# Patient Record
Sex: MALE | Race: WHITE | NOT HISPANIC OR LATINO | Employment: OTHER | URBAN - METROPOLITAN AREA
[De-identification: names, ages, dates, MRNs, and addresses within clinical notes are randomized per-mention and may not be internally consistent; named-entity substitution may affect disease eponyms.]

---

## 2017-02-01 ENCOUNTER — HOSPITAL ENCOUNTER (OUTPATIENT)
Dept: GENERAL RADIOLOGY | Facility: HOSPITAL | Age: 62
Discharge: HOME | End: 2017-02-01
Admitting: INTERNAL MEDICINE

## 2017-02-01 ENCOUNTER — OFFICE VISIT (OUTPATIENT)
Dept: FAMILY MEDICINE CLINIC | Facility: CLINIC | Age: 62
End: 2017-02-01

## 2017-02-01 VITALS
SYSTOLIC BLOOD PRESSURE: 144 MMHG | RESPIRATION RATE: 18 BRPM | BODY MASS INDEX: 40.56 KG/M2 | HEART RATE: 89 BPM | DIASTOLIC BLOOD PRESSURE: 84 MMHG | WEIGHT: 289.7 LBS | HEIGHT: 71 IN | TEMPERATURE: 98.1 F

## 2017-02-01 DIAGNOSIS — G89.29 CHRONIC PAIN OF RIGHT KNEE: Primary | ICD-10-CM

## 2017-02-01 DIAGNOSIS — M25.561 CHRONIC PAIN OF RIGHT KNEE: Primary | ICD-10-CM

## 2017-02-01 PROCEDURE — 73560 X-RAY EXAM OF KNEE 1 OR 2: CPT

## 2017-02-01 PROCEDURE — 99213 OFFICE O/P EST LOW 20 MIN: CPT | Performed by: INTERNAL MEDICINE

## 2017-02-01 RX ORDER — AZELASTINE 1 MG/ML
1 SPRAY, METERED NASAL NIGHTLY
COMMUNITY
End: 2019-09-24 | Stop reason: SDUPTHER

## 2017-02-01 RX ORDER — DEXTROAMPHETAMINE SACCHARATE, AMPHETAMINE ASPARTATE, DEXTROAMPHETAMINE SULFATE AND AMPHETAMINE SULFATE 5; 5; 5; 5 MG/1; MG/1; MG/1; MG/1
20 TABLET ORAL DAILY
COMMUNITY

## 2017-02-01 NOTE — PROGRESS NOTES
"Subjective   Patient ID: Jose Ramon Murcia is a 61 y.o. male presents with   Chief Complaint   Patient presents with   • Knee Pain     Right knee, pain that is over the knee cap and on to the right side of knee cap, it is a very tight feeling. Cant walk down steps with bending knee at all   • Immunizations     wants to get shingles shot       HPI - this patient presents today with complaints of chronic right knee pain recently has gotten worse going up and down the stairs.  Primarily it's on the medial aspect of the right knee area he's tried no nonsteroidal anti-inflammatories he has no problem with them however he's had him in the past without difficulty.    Assessment plan    Chronic right knee pain suspect meniscal injury start Advil 400 mg twice daily with food warned him of potential GI side effects hypertension and swelling.  We'll get a knee x-ray.    Allergies   Allergen Reactions   • Clarithromycin    • Sulfa Antibiotics        The following portions of the patient's history were reviewed and updated as appropriate: allergies, current medications, past family history, past medical history, past social history, past surgical history and problem list.      Review of Systems   Constitutional: Negative.    Musculoskeletal: Positive for arthralgias and gait problem.   Neurological: Negative for weakness and numbness.   Psychiatric/Behavioral: Negative.        Objective     Vitals:    02/01/17 0932   BP: 144/84   Pulse: 89   Resp: 18   Temp: 98.1 °F (36.7 °C)   TempSrc: Oral   Weight: 289 lb 11.2 oz (131 kg)   Height: 71\" (180.3 cm)         Physical Exam   Constitutional: He is oriented to person, place, and time. He appears well-developed and well-nourished.   Musculoskeletal: He exhibits no edema, tenderness or deformity.   Neurological: He is alert and oriented to person, place, and time.   Psychiatric: He has a normal mood and affect. His behavior is normal.   Nursing note and vitals " reviewed.        Jsoe Ramon was seen today for knee pain and immunizations.    Diagnoses and all orders for this visit:    Chronic pain of right knee  -     XR Knee 1 or 2 View Right        Call or return to clinic prn if these symptoms worsen or fail to improve as anticipated.

## 2017-03-08 RX ORDER — TAMSULOSIN HYDROCHLORIDE 0.4 MG/1
CAPSULE ORAL
Qty: 90 CAPSULE | Refills: 0 | Status: SHIPPED | OUTPATIENT
Start: 2017-03-08 | End: 2017-06-06 | Stop reason: SDUPTHER

## 2017-03-24 ENCOUNTER — OFFICE VISIT (OUTPATIENT)
Dept: FAMILY MEDICINE CLINIC | Facility: CLINIC | Age: 62
End: 2017-03-24

## 2017-03-24 VITALS
HEIGHT: 71 IN | TEMPERATURE: 98.2 F | HEART RATE: 86 BPM | WEIGHT: 287 LBS | RESPIRATION RATE: 18 BRPM | SYSTOLIC BLOOD PRESSURE: 172 MMHG | OXYGEN SATURATION: 93 % | DIASTOLIC BLOOD PRESSURE: 88 MMHG | BODY MASS INDEX: 40.18 KG/M2

## 2017-03-24 DIAGNOSIS — M75.82 ROTATOR CUFF TENDONITIS, LEFT: ICD-10-CM

## 2017-03-24 DIAGNOSIS — M25.561 CHRONIC PAIN OF RIGHT KNEE: Primary | ICD-10-CM

## 2017-03-24 DIAGNOSIS — G89.29 CHRONIC PAIN OF RIGHT KNEE: Primary | ICD-10-CM

## 2017-03-24 PROBLEM — M75.80 ROTATOR CUFF TENDONITIS: Status: ACTIVE | Noted: 2017-03-24

## 2017-03-24 PROCEDURE — 96372 THER/PROPH/DIAG INJ SC/IM: CPT | Performed by: INTERNAL MEDICINE

## 2017-03-24 PROCEDURE — 99213 OFFICE O/P EST LOW 20 MIN: CPT | Performed by: INTERNAL MEDICINE

## 2017-03-24 RX ORDER — METHYLPREDNISOLONE ACETATE 80 MG/ML
80 INJECTION, SUSPENSION INTRA-ARTICULAR; INTRALESIONAL; INTRAMUSCULAR; SOFT TISSUE ONCE
Status: COMPLETED | OUTPATIENT
Start: 2017-03-24 | End: 2017-03-24

## 2017-03-24 RX ADMIN — METHYLPREDNISOLONE ACETATE 80 MG: 80 INJECTION, SUSPENSION INTRA-ARTICULAR; INTRALESIONAL; INTRAMUSCULAR; SOFT TISSUE at 14:56

## 2017-03-24 NOTE — PROGRESS NOTES
"Subjective   Patient ID: Jose Ramon Murcia is a 62 y.o. male presents with   Chief Complaint   Patient presents with   • Knee Pain     both knees   • Shoulder Pain     left   • Middle Finger Pain     on right hand       HPI - this patient has osteoarthritis of the right knee greater than left.  We did an x-ray that demonstrated than his symptoms are consistent with it.  Pain in the medial aspect of the knees worse with ambulation described as a soreness.  He's not tried any nonsteroidal anti-inflammatories he wants to try some physical therapy.  Also is having left shoulder pain with abduction.  This been going on for months but gotten worse.    Assessment plan    Osteoarthritis the right knee for and rotator cuff left shoulder send to physical therapy we'll give him Depo-Medrol 80 in the left shoulder.  In about 3 days on him to start taking ibuprofen 400 mg twice daily with food warned him of potential side effects.  If patient's not getting better he's to let me know.  He agreed.    Allergies   Allergen Reactions   • Clarithromycin    • Sulfa Antibiotics        The following portions of the patient's history were reviewed and updated as appropriate: allergies, current medications, past family history, past medical history, past social history, past surgical history and problem list.      Review of Systems   Constitutional: Negative.    Respiratory: Negative.    Cardiovascular: Negative.    Musculoskeletal: Positive for arthralgias, gait problem and joint swelling.       Objective     Vitals:    03/24/17 1430   BP: 172/88   Pulse: 86   Resp: 18   Temp: 98.2 °F (36.8 °C)   TempSrc: Oral   SpO2: 93%   Weight: 287 lb (130 kg)   Height: 71\" (180.3 cm)         Physical Exam   Constitutional: He is oriented to person, place, and time. He appears well-developed and well-nourished.   Musculoskeletal: He exhibits tenderness.   Pain with active and passive range of motion left shoulder.  Mild tenderness medial aspect of " the left knee.   Neurological: He is alert and oriented to person, place, and time.   Psychiatric: He has a normal mood and affect. His behavior is normal.   Nursing note and vitals reviewed.        Jose Ramon was seen today for knee pain, shoulder pain and middle finger pain.    Diagnoses and all orders for this visit:    Chronic pain of right knee  -     Ambulatory Referral to Physical Therapy  -     Ambulatory Referral to Physical Therapy Evaluate and treat    Rotator cuff tendonitis, left  -     Ambulatory Referral to Physical Therapy Evaluate and treat  -     methylPREDNISolone acetate (DEPO-medrol) injection 80 mg; Inject 1 mL into the shoulder, thigh, or buttocks 1 (One) Time.        Call or return to clinic prn if these symptoms worsen or fail to improve as anticipated.

## 2017-04-07 ENCOUNTER — HOSPITAL ENCOUNTER (OUTPATIENT)
Dept: PHYSICAL THERAPY | Facility: HOSPITAL | Age: 62
Setting detail: THERAPIES SERIES
Discharge: HOME | End: 2017-04-07

## 2017-04-07 DIAGNOSIS — G89.29 CHRONIC PAIN OF BOTH KNEES: Primary | ICD-10-CM

## 2017-04-07 DIAGNOSIS — G89.29 CHRONIC LEFT SHOULDER PAIN: ICD-10-CM

## 2017-04-07 DIAGNOSIS — M25.562 CHRONIC PAIN OF BOTH KNEES: Primary | ICD-10-CM

## 2017-04-07 DIAGNOSIS — M17.11 PATELLOFEMORAL ARTHRITIS OF RIGHT KNEE: ICD-10-CM

## 2017-04-07 DIAGNOSIS — M75.52 SHOULDER BURSITIS, LEFT: ICD-10-CM

## 2017-04-07 DIAGNOSIS — M25.561 CHRONIC PAIN OF BOTH KNEES: Primary | ICD-10-CM

## 2017-04-07 DIAGNOSIS — M25.512 CHRONIC LEFT SHOULDER PAIN: ICD-10-CM

## 2017-04-07 DIAGNOSIS — S46.012D STRAIN OF ROTATOR CUFF CAPSULE, LEFT, SUBSEQUENT ENCOUNTER: ICD-10-CM

## 2017-04-07 PROCEDURE — 97110 THERAPEUTIC EXERCISES: CPT | Performed by: PHYSICAL THERAPIST

## 2017-04-07 PROCEDURE — 97161 PT EVAL LOW COMPLEX 20 MIN: CPT | Performed by: PHYSICAL THERAPIST

## 2017-04-07 NOTE — PROGRESS NOTES
"    Outpatient Physical Therapy Ortho Initial Evaluation  [facility]     Patient Name: Jose Ramon Murcia  : 1955  MRN: [id number]  Today's Date: 2017      Visit Date: 2017    Patient Active Problem List   Diagnosis   • Allergic rhinitis   • Cervical spinal stenosis   • ED (erectile dysfunction) of non-organic origin   • BP (high blood pressure)   • Lumbar radiculopathy   • Neoplasm of skin   • Obstructive apnea   • Burning or prickling sensation   • Neck pain   • Benign non-nodular prostatic hyperplasia with lower urinary tract symptoms   • Shoulder pain, acute   • Urinary frequency   • Hyperlipidemia   • Chronic pain of right knee   • Rotator cuff tendonitis        Past Medical History:   Diagnosis Date   • Allergic    • Arthritis    • ED (erectile dysfunction)    • Hypertension         Past Surgical History:   Procedure Laterality Date   • ANAL FISTULOTOMY     • APPENDECTOMY     • COLONOSCOPY     • NECK SURGERY     • WISDOM TOOTH EXTRACTION         Visit Dx:     ICD-10-CM ICD-9-CM   1. Chronic pain of both knees M25.561 719.46    M25.562 338.29    G89.29    2. Chronic left shoulder pain M25.512 719.41    G89.29 338.29   3. Patellofemoral arthritis of right knee M19.90 716.96   4. Shoulder bursitis, left M75.52 726.10   5. 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 I started noticing my knee pain a few years ago. At times my R knee acts like it wants to \"give " "out,\" especially on stairs. My L side does it too but not as often. I also have a lot of trouble sitting with my knee bent for any length of time. I am always adjusting/moving about to keep the knee from aching. I injured my R ankle years ago so that doesnt help. With respect to my L shoulder, it started 9-10 months ago. No trauma. I have a lot of pain raising overhead, reaching behind my back, or putting on a shirt. I got a cortisone injection and it feels a little better but I cannot sleep on that side without pain.   -CK      Previous treatment for THIS PROBLEM Injections  -CK      Patient/Caregiver Goals Relieve pain;Return to prior level of function;Know what to do to help the symptoms  -CK      Current Tobacco Use none  -CK      Patient's Rating of General Health Good  -CK      Hand Dominance right-handed  -CK      What clinical tests have you had for this problem? X-ray  -CK      Results of Clinical Tests patellofemoral arthritis and medial compartment arthritis  -CK      Pain     Pain Location Knee;Leg;Shoulder  -CK      Pain at Present 1  -CK      Pain at Best 4  -CK      Pain at Worst 0  -CK      Pain Frequency Constant/continuous  -CK      Pain Description Sharp;Shooting  -CK      Is your sleep disturbed? Yes  -CK      Fall Risk Assessment    Any falls in the past year: No  -CK      Services    Prior Rehab/Home Health Experiences No  -CK      Daily Activities    Primary Language English  -CK      How does patient learn best? Listening;Reading;Demonstration;Pictures/Video  -CK      Teaching needs identified Home Exercise Program;Management of Condition  -CK      Patient is concerned about/has problems with Climbing Stairs;Difficulty with self care (i.e. bathing, dressing, toileting:;Flexibility;Performing home management (household chores, shopping, care of dependents);Performing job responsibilities/community activities (work, school,;Performing sports, recreation, and play activities;Reaching over " head;Repetitive movements of the hand, arm, shoulder;Sitting;Walking  -CK      Does patient have problems with the following? None  -CK      Barriers to learning None  -CK      Functional Status --   Independent  -CK      Pt Participated in POC and Goals Yes  -CK      Safety    Are you being hurt, hit, or frightened by anyone at home or in your life? No  -CK      Are you being neglected by a caregiver No  -CK        User Key  (r) = Recorded By, (t) = Taken By, (c) = Cosigned By    Initials Name Provider Type    CK Mohan Valdivia, PT Physical Therapist                PT Ortho       04/07/17 1000    Posture/Observations    Rounded Shoulders Moderate  -CK    Genu varus Mild  -CK    Foot pronation Mild  -CK    Sensation    Sensation WNL? WNL  -CK    Light Touch No apparent deficits  -CK    Left Shoulder    Flexion AROM Deficit 150, painful arc   -CK    ABduction AROM Deficit 150, pain end range  -CK    External Rotation AROM Deficit 55, pain end range  -CK    Internal Rotation AROM Deficit L hip, just posterior to side of body  -CK    Left Knee    Extension/Flexion AROM Deficit 0-118  -CK    Right Knee    Extension/Flexion AROM Deficit 0-115  -CK    Left Shoulder    Flexion Gross Movement (4+/5) good plus   pain  -CK    ABduction Gross Movement (4+/5) good plus   pain  -CK    Int Rotation Gross Movement (4/5) good   pain  -CK    Ext Rotation Gross Movement (4/5) good   pain  -CK    Left Hip    Hip Flexion Gross Movement (4+/5) good plus  -CK    Right Hip    Hip Flexion Gross Movement (4+/5) good plus  -CK    Left Knee    Knee Extension Gross Movement (4+/5) good plus  -CK    Knee Flexion Gross Movement (4+/5) good plus  -CK    Right Knee    Knee Extension Gross Movement (4+/5) good plus  -CK    Knee Flexion Gross Movement (4/5) good  -CK    Upper Extremity Flexibility    Pect Minor Bilateral:;Moderately limited  -CK    Lower Extremity Flexibility    Hamstrings Bilateral:;Moderately limited  -CK    Hip Flexors  Bilateral:;Moderately limited  -CK    Quadriceps Bilateral:;Moderately limited  -CK    Gastrocnemius Bilateral:;Moderately limited  -CK    Soleus Bilateral:;Moderately limited  -CK    Transfers    Transfers, Sit-Stand Clayton independent  -CK    Gait Assessment/Treatment    Gait, Clayton Level independent  -CK    Stairs Assessment/Treatment    Number of Stairs 4  -CK    Stairs, Handrail Location both sides  -CK    Stairs, [place] Level independent  -CK    Stairs, Technique Used step to step (ascending);step to step (descending)  -CK      User Key  (r) = Recorded By, (t) = Taken By, (c) = Cosigned By    Initials Name Provider Type    KEAGAN Valdivia PT Physical Therapist                            Therapy Education       04/07/17 1050          Therapy Education    Given HEP;Symptoms/condition management;Pain management   HEP handouts given. Instructed to use Ice after  -CK      Program New  -CK      How Provided Demonstration;Written;Verbal  -CK      Provided to Patient  -CK      Level of Understanding Teach back education performed;Verbalized  -CK        User Key  (r) = Recorded By, (t) = Taken By, (c) = Cosigned By    Initials Name Provider Type    KEAGAN Valdivia PT Physical Therapist                PT OP Goals       04/07/17 1000       PT Short Term Goals    STG 1 Patient will be independent and compliant with initial home exercise program  -CK     STG 2 Patient will be independent with education for symptom management, joint protection and strategies to minimize stress on affected tissues  -CK     Long Term Goals    LTG 1 Patient will be independent and compliant with advanced home exercise program to facilitate self-management of symptoms  -CK     LTG 2 Patient will increase shoulder flexion L AROM flexion to 0-160 deg, without painful arc, to increase ease of reaching overhead into cabinets, putting on shirt, etc.  -CK     LTG 3 Patient will be able to reach with L shoulder to L2 for  "improved IR with donning/doffing belt.  -CK     LTG 4 Pt. will report L shoulder pain </= 1-2/10 with all daily activities and sleeping.  -CK     LTG 5 Patient will report 50% improvement with stair negotiation and report no episodes of \"giving way.\"  -CK     LTG 6 Patient will score >/= 60/80 on Knee Outcome Survery, indicating improved perceived functional ability with daily activities  -CK     LTG 7 Patient will report ability to sit >/= 30 min without shifting in chair/changing knee positions in order for him to better perform his job as a [employer] .   -CK       User Key  (r) = Recorded By, (t) = Taken By, (c) = Cosigned By    Initials Name Provider Type    CK Mohan Valdivia, PT Physical Therapist                PT Assessment/Plan       04/07/17 1106       PT Assessment    Functional Limitations Limitation in home management;Limitations in community activities;Performance in work activities;Performance in sport activities;Performance in self-care ADL;Performance in leisure activities;Limitations in functional capacity and performance  -CK     Impairments Range of motion;Impaired muscle endurance;Pain;Muscle strength;Posture;Joint mobility;Impaired flexibility  -CK     Assessment Comments Mr. Doll is a 62 year old male who presents to clinic with c/o B knee pain and L shoulder pain. Imaging performed to R knee confirmed medial compartment arthritis and patellofemoral arthritis with spurs present. He reports difficulty with extended sitting (knee flexed) and difficulty with stairs. An \"old\" R ankle injury complicates his R knee pain. AROM R 0-115 and L 0-118. Mr Doll also reports c/o L shoulder pain of 9-10 months nature. No trauma. He reports difficulty reaching overhead, dressing, reaching behind his back, or attempting to sleep on the L side. No imaging of shoulder performed to date. Special testing + for impingement and subacromial bursa irritation. Special tests negative for RTC tear. " Self Scored outcome measures: Knee Outcome Survery 49/80 = 61% (where 80/80= no deficits) and DASH (UE) = 34 (where 0 = no deficits). He would benefit from skilled therapy to address his many deficits in order to improve his current quality of life and educate him on self management once discharged from formal therapy.   -CK     Please refer to paper survey for additional self-reported information Yes  -CK     Rehab Potential Good  -CK     Patient/caregiver participated in establishment of treatment plan and goals Yes  -CK     Patient would benefit from skilled therapy intervention Yes  -CK     PT Plan    PT Frequency 2x/week  -CK     Predicted Duration of Therapy Intervention (days/wks) 1 month  -CK     Planned CPT's? PT EVAL LOW COMPLEXITY: 20450;PT THER PROC EA 15 MIN: 70439;PT NEUROMUSC RE-EDUCATION EA 15 MIN: 97502;PT MANUAL THERAPY EA 15 MIN: 95052;PT AQUATIC THERAPY EA 15 MIN: 85322;PT ULTRASOUND EA 15 MIN: 41536;PT HOT OR COLD PACK TREAT MCARE;PT IONTOPHORESIS EA 15 MIN: 03758;PT ELECTRICAL STIM UNATTEND:   -CK     PT Plan Comments Two body parts: B knee and L shoulder. Review HEP, answer questions as needed. Knee strengthening/stretching program. Add in R way resisted ankle. Scapular strengthening program. Modalities PRN for L shoulder (ie. ultrasound). Ice at end.   -CK       User Key  (r) = Recorded By, (t) = Taken By, (c) = Cosigned By    Initials Name Provider Type    CK Mohan DESIREE Valdivia, PT Physical Therapist                  Exercises       04/07/17 1000          Exercise 1    Exercise Name 1 reverse shoulder rolls  -GJ      Cueing 1 Verbal;Demo  -GJ      Reps 1 15  -GJ      Exercise 2    Exercise Name 2 scap retraction  -GJ      Cueing 2 Verbal;Demo  -GJ      Reps 2 15  -GJ      Time (Seconds) 2 3  -GJ      Exercise 3    Exercise Name 3 shoulder ext  -GJ      Cueing 3 Verbal;Demo  -GJ      Equipment 3 Theraband  -GJ      Resistance 3 Red  -GJ      Reps 3 15  -GJ      Exercise 4    Exercise Name 4  Rows  -GJ      Cueing 4 Verbal;Demo  -GJ      Equipment 4 Theraband  -GJ      Resistance 4 Red  -GJ      Reps 4 15  -GJ      Exercise 5    Exercise Name 5 wash the wall  -GJ      Cueing 5 Demo  -GJ      Reps 5 10  -GJ      Exercise 6    Exercise Name 6 1 arm neural stretch at door frame,   -GJ      Cueing 6 Demo  -GJ      Reps 6 3  -GJ      Time (Seconds) 6 20  -GJ      Exercise 7    Exercise Name 7 quad set  -GJ      Cueing 7 Verbal  -GJ      Reps 7 5  -GJ      Time (Seconds) 7 15  -GJ      Exercise 8    Exercise Name 8 LAQ  -GJ      Cueing 8 Demo  -GJ      Reps 8 10  -GJ      Exercise 9    Exercise Name 9 gastroc/soleus stretch on step  -GJ      Cueing 9 Demo  -GJ      Reps 9 3  -GJ      Time (Seconds) 9 30  -GJ      Exercise 10    Exercise Name 10 standing quad/hip flexor stretch (can substitue prone hip flexor/quad stretch)  -GJ      Cueing 10 Demo  -GJ      Reps 10 3  -GJ      Time (Seconds) 10 20  -GJ        User Key  (r) = Recorded By, (t) = Taken By, (c) = Cosigned By    Initials Name Provider Type    ALAINA Morataya PT Physical Therapist                              Outcome Measures       04/07/17 1000          DASH    DASH COMMENTS 34%  -GJ      Knee Outcome Score    Knee Outcome Score Comments 61%  -GJ      Functional Assessment    Outcome Measure Options Knee Outcome Score- ADL;Disabilities of the Arm, Shoulder, and Hand (DASH)  -GJ        User Key  (r) = Recorded By, (t) = Taken By, (c) = Cosigned By    Initials Name Provider Type    ALAINA Morataya PT Physical Therapist            Time Calculation:   Start Time: 0930  Stop Time: 1030  Time Calculation (min): 60 min     Therapy Charges for Today     Code Description Service Date Service Provider Modifiers Qty    57869663115  PT EVAL LOW COMPLEXITY 3 4/7/2017 Moahn Valdivia, PT GP 1    [id number] HC PT THER PROC EA 15 MIN 4/7/2017 Mohan Valdivia, PT GP 1          PT G-Codes  Outcome Measure Options: Knee Outcome Score- ADL, Disabilities of the  Arm, Shoulder, and Hand (DASH)         Mohan Valdivia, PT  4/7/2017

## 2017-04-19 ENCOUNTER — HOSPITAL ENCOUNTER (OUTPATIENT)
Dept: PHYSICAL THERAPY | Facility: HOSPITAL | Age: 62
Setting detail: THERAPIES SERIES
Discharge: HOME | End: 2017-04-19

## 2017-04-19 DIAGNOSIS — M17.11 PATELLOFEMORAL ARTHRITIS OF RIGHT KNEE: ICD-10-CM

## 2017-04-19 DIAGNOSIS — G89.29 CHRONIC LEFT SHOULDER PAIN: ICD-10-CM

## 2017-04-19 DIAGNOSIS — M75.52 SHOULDER BURSITIS, LEFT: ICD-10-CM

## 2017-04-19 DIAGNOSIS — M25.512 CHRONIC LEFT SHOULDER PAIN: ICD-10-CM

## 2017-04-19 DIAGNOSIS — M25.561 CHRONIC PAIN OF BOTH KNEES: Primary | ICD-10-CM

## 2017-04-19 DIAGNOSIS — M25.562 CHRONIC PAIN OF BOTH KNEES: Primary | ICD-10-CM

## 2017-04-19 DIAGNOSIS — G89.29 CHRONIC PAIN OF BOTH KNEES: Primary | ICD-10-CM

## 2017-04-19 DIAGNOSIS — S46.012D STRAIN OF ROTATOR CUFF CAPSULE, LEFT, SUBSEQUENT ENCOUNTER: ICD-10-CM

## 2017-04-19 PROCEDURE — 97110 THERAPEUTIC EXERCISES: CPT

## 2017-04-19 NOTE — PROGRESS NOTES
Outpatient Physical Therapy Ortho Treatment Note  [facility]     Patient Name: Jose Ramon Murcia  : 1955  MRN: [id number]  Today's Date: 2017      Visit Date: 2017    Visit Dx:    ICD-10-CM ICD-9-CM   1. Chronic pain of both knees M25.561 719.46    M25.562 338.29    G89.29    2. Chronic left shoulder pain M25.512 719.41    G89.29 338.29   3. Patellofemoral arthritis of right knee M19.90 716.96   4. Shoulder bursitis, left M75.52 726.10   5. Strain of rotator cuff capsule, left, subsequent encounter S46.012D V58.89     840.4       Patient Active Problem List   Diagnosis   • Allergic rhinitis   • Cervical spinal stenosis   • ED (erectile dysfunction) of non-organic origin   • BP (high blood pressure)   • Lumbar radiculopathy   • Neoplasm of skin   • Obstructive apnea   • Burning or prickling sensation   • Neck pain   • Benign non-nodular prostatic hyperplasia with lower urinary tract symptoms   • Shoulder pain, acute   • Urinary frequency   • Hyperlipidemia   • Chronic pain of right knee   • Rotator cuff tendonitis        Past Medical History:   Diagnosis Date   • Allergic    • Arthritis    • ED (erectile dysfunction)    • Hypertension         Past Surgical History:   Procedure Laterality Date   • ANAL FISTULOTOMY     • APPENDECTOMY     • COLONOSCOPY     • NECK SURGERY     • WISDOM TOOTH EXTRACTION                               PT Assessment/Plan       17 1309       PT Assessment    Assessment Comments Patient continues to have increased pain margarette with stairs. Patient currently woking on postural position with sleeping on left shoulder. Added right ankle strengthening, RTC strengthening and added weight margarette LE to increase strength. Patient will look at home for ankle weights. Continue work on strengthening and education.   -BELA       User Key  (r) = Recorded By, (t) = Taken By, (c) = Cosigned By    Initials Name Provider Type    BELA Ko PTA Physical Therapy Assistant                     Exercises       04/19/17 1225          Subjective Comments    Subjective Comments No problems with HEP. Knee pain walking up/down stairs  -WS      Exercise 1    Exercise Name 1 reverse shoulder rolls  -WS      Cueing 1 Verbal;Demo  -WS      Reps 1 15  -WS      Exercise 2    Exercise Name 2 scap retraction  -WS      Cueing 2 Verbal;Demo  -WS      Reps 2 15  -WS      Time (Seconds) 2 3  -WS      Exercise 3    Exercise Name 3 shoulder ext  -WS      Cueing 3 Verbal;Demo  -WS      Equipment 3 Theraband  -WS      Resistance 3 Red  -WS      Reps 3 15  -WS      Exercise 4    Exercise Name 4 Rows  -WS      Cueing 4 Verbal;Demo  -WS      Equipment 4 Theraband  -WS      Resistance 4 Red  -WS      Reps 4 15  -WS      Exercise 5    Exercise Name 5 wash the wall   circles  -WS      Cueing 5 Demo  -WS      Reps 5 10  -WS      Exercise 6    Exercise Name 6 1 arm neural stretch at door frame,   -WS      Cueing 6 Demo  -WS      Reps 6 3  -WS      Time (Seconds) 6 20  -WS      Exercise 7    Exercise Name 7 quad set  -WS      Cueing 7 Verbal  -WS      Reps 7 5  -WS      Time (Seconds) 7 15  -WS      Exercise 8    Exercise Name 8 LAQ  -WS      Cueing 8 Demo  -WS      Weights/Plates 8 3  -WS      Reps 8 15  -WS      Exercise 9    Exercise Name 9 gastroc/soleus stretch on step  -WS      Cueing 9 Demo  -WS      Reps 9 3  -WS      Time (Seconds) 9 30  -WS      Exercise 10    Exercise Name 10 standing quad/hip flexor stretch (can substitue prone hip flexor/quad stretch)  -WS      Cueing 10 Demo  -WS      Reps 10 3  -WS      Time (Seconds) 10 20  -WS      Exercise 11    Exercise Name 11 Nu step L5  -WS      Time (Minutes) 11 5  -WS      Exercise 12    Exercise Name 12 calf raise  -WS      Reps 12 15  -WS      Exercise 13    Exercise Name 13 Hamstring curl margarette  -WS      Weights/Plates 13 2  -WS      Reps 13 15  -WS      Exercise 14    Exercise Name 14 margarette ER  -WS      Resistance 14 Red  -WS      Reps 14 10  -WS      Exercise 15  "   Exercise Name 15 ankle 3 way  -      Resistance 15 Red  -      Reps 15 15  -        User Key  (r) = Recorded By, (t) = Taken By, (c) = Cosigned By    Initials Name Provider Type    BELA Ko PTA Physical Therapy Assistant                               PT OP Goals       04/19/17 1300       PT Short Term Goals    STG 1 Patient will be independent and compliant with initial home exercise program  -     STG 1 Progress Met  -     STG 2 Patient will be independent with education for symptom management, joint protection and strategies to minimize stress on affected tissues  -     STG 2 Progress Ongoing  -     Long Term Goals    LTG 1 Patient will be independent and compliant with advanced home exercise program to facilitate self-management of symptoms  -     LTG 2 Patient will increase shoulder flexion L AROM flexion to 0-160 deg, without painful arc, to increase ease of reaching overhead into cabinets, putting on shirt, etc.  -     LTG 3 Patient will be able to reach with L shoulder to L2 for improved IR with donning/doffing belt.  -     LTG 4 Pt. will report L shoulder pain </= 1-2/10 with all daily activities and sleeping.  -     LTG 5 Patient will report 50% improvement with stair negotiation and report no episodes of \"giving way.\"  -     LTG 6 Patient will score >/= 60/80 on Knee Outcome Survery, indicating improved perceived functional ability with daily activities  -     LTG 7 Patient will report ability to sit >/= 30 min without shifting in chair/changing knee positions in order for him to better perform his job as a [employer] .   -       User Key  (r) = Recorded By, (t) = Taken By, (c) = Cosigned By    Initials Name Provider Type    BELA Ko PTA Physical Therapy Assistant                Therapy Education       04/19/17 1307          Therapy Education    Given HEP;Symptoms/condition management;Pain management;Posture/body mechanics  -      Program " Reinforced  -WS      How Provided Verbal  -WS      Provided to Patient  -WS        User Key  (r) = Recorded By, (t) = Taken By, (c) = Cosigned By    Initials Name Provider Type    WS Jose G Ko PTA Physical Therapy Assistant                Time Calculation:   Start Time: 1230  Stop Time: 1300  Time Calculation (min): 30 min    Therapy Charges for Today     Code Description Service Date Service Provider Modifiers Qty    90693232135 HC PT THER PROC EA 15 MIN 4/19/2017 Jose G Ko PTA GP 2                    Jose G Ko PTA  4/19/2017

## 2017-04-21 ENCOUNTER — HOSPITAL ENCOUNTER (OUTPATIENT)
Dept: PHYSICAL THERAPY | Facility: HOSPITAL | Age: 62
Setting detail: THERAPIES SERIES
Discharge: HOME | End: 2017-04-21

## 2017-04-21 DIAGNOSIS — M25.512 CHRONIC LEFT SHOULDER PAIN: ICD-10-CM

## 2017-04-21 DIAGNOSIS — M25.561 CHRONIC PAIN OF BOTH KNEES: Primary | ICD-10-CM

## 2017-04-21 DIAGNOSIS — M75.52 SHOULDER BURSITIS, LEFT: ICD-10-CM

## 2017-04-21 DIAGNOSIS — S46.012D STRAIN OF ROTATOR CUFF CAPSULE, LEFT, SUBSEQUENT ENCOUNTER: ICD-10-CM

## 2017-04-21 DIAGNOSIS — G89.29 CHRONIC PAIN OF BOTH KNEES: Primary | ICD-10-CM

## 2017-04-21 DIAGNOSIS — M17.11 PATELLOFEMORAL ARTHRITIS OF RIGHT KNEE: ICD-10-CM

## 2017-04-21 DIAGNOSIS — M25.562 CHRONIC PAIN OF BOTH KNEES: Primary | ICD-10-CM

## 2017-04-21 DIAGNOSIS — G89.29 CHRONIC LEFT SHOULDER PAIN: ICD-10-CM

## 2017-04-21 PROCEDURE — 97110 THERAPEUTIC EXERCISES: CPT | Performed by: PHYSICAL THERAPIST

## 2017-04-21 NOTE — PROGRESS NOTES
Outpatient Physical Therapy Ortho Treatment Note  [facility]     Patient Name: Jose Ramon Murcia  : 1955  MRN: [id number]  Today's Date: 2017      Visit Date: 2017    Visit Dx:    ICD-10-CM ICD-9-CM   1. Chronic pain of both knees M25.561 719.46    M25.562 338.29    G89.29    2. Chronic left shoulder pain M25.512 719.41    G89.29 338.29   3. Shoulder bursitis, left M75.52 726.10   4. Patellofemoral arthritis of right knee M19.90 716.96   5. Strain of rotator cuff capsule, left, subsequent encounter S46.012D V58.89     840.4       Patient Active Problem List   Diagnosis   • Allergic rhinitis   • Cervical spinal stenosis   • ED (erectile dysfunction) of non-organic origin   • BP (high blood pressure)   • Lumbar radiculopathy   • Neoplasm of skin   • Obstructive apnea   • Burning or prickling sensation   • Neck pain   • Benign non-nodular prostatic hyperplasia with lower urinary tract symptoms   • Shoulder pain, acute   • Urinary frequency   • Hyperlipidemia   • Chronic pain of right knee   • Rotator cuff tendonitis        Past Medical History:   Diagnosis Date   • Allergic    • Arthritis    • ED (erectile dysfunction)    • Hypertension         Past Surgical History:   Procedure Laterality Date   • ANAL FISTULOTOMY     • APPENDECTOMY     • COLONOSCOPY     • NECK SURGERY     • WISDOM TOOTH EXTRACTION                               PT Assessment/Plan       17 1328       PT Assessment    Assessment Comments Mr. Doll reporting decreased pain since initiating sessions. Continued with general program for all joints involved. Added hip abduction, serratus punches, and increased to 3# with knee strengthening exercises. Verbal cueing for exercises to be performed in a painfree range.  -CK       User Key  (r) = Recorded By, (t) = Taken By, (c) = Cosigned By    Initials Name Provider Type    KEAGAN Valdivia, PT Physical Therapist                    Exercises       17 1300           Subjective Comments    Subjective Comments I was definitely sore after last session. I feel tweeks in my L shoulder with driving so I am trying to change what I am doing to decrease the irritation.   -CK      Subjective Pain    Able to rate subjective pain? yes  -CK      Pre-Treatment Pain Level 2  -CK      Post-Treatment Pain Level 2  -CK      Exercise 1    Exercise Name 1 reverse shoulder rolls  -CK      Cueing 1 Verbal;Demo  -CK      Weights/Plates 1 3  -CK      Reps 1 15  -CK      Exercise 3    Exercise Name 3 shoulder ext  -CK      Cueing 3 Verbal;Demo  -CK      Equipment 3 Theraband  -CK      Resistance 3 Red  -CK      Reps 3 15  -CK      Exercise 4    Exercise Name 4 Rows  -CK      Cueing 4 Verbal;Demo  -CK      Equipment 4 Theraband  -CK      Resistance 4 Red  -CK      Reps 4 15  -CK      Exercise 5    Exercise Name 5 wash the wall: flexion and Cw/ccw circles, scaption  -CK      Cueing 5 Verbal  -CK      Reps 5 15  -CK      Exercise 6    Exercise Name 6 1 arm neural stretch at door frame,   -CK      Cueing 6 Verbal  -CK      Reps 6 3  -CK      Time (Seconds) 6 20  -CK      Exercise 7    Exercise Name 7 quad set  -CK      Cueing 7 Verbal  -CK      Reps 7 5  -CK      Time (Seconds) 7 15  -CK      Exercise 8    Exercise Name 8 LAQ  -CK      Cueing 8 Demo  -CK      Weights/Plates 8 3  -CK      Reps 8 15  -CK      Exercise 9    Exercise Name 9 gastroc/soleus stretch on step  -CK      Cueing 9 Demo  -CK      Reps 9 3  -CK      Time (Seconds) 9 30  -CK      Exercise 10    Exercise Name 10 standing quad/hip flexor stretch (can substitue prone hip flexor/quad stretch)  -CK      Cueing 10 Demo  -CK      Reps 10 2  -CK      Time (Seconds) 10 30  -CK      Exercise 11    Exercise Name 11 Nu step L5  -CK      Time (Minutes) 11 5  -CK      Exercise 12    Exercise Name 12 calf raise  -CK      Reps 12 15  -CK      Exercise 13    Exercise Name 13 Hamstring curl margarette  -CK      Weights/Plates 13 3  -CK      Reps 13 15  -CK       Exercise 14    Exercise Name 14 margarette ER  -CK      Resistance 14 Red  -CK      Reps 14 15  -CK      Exercise 15    Exercise Name 15 ankle 3 way  -CK      Resistance 15 Red  -CK      Reps 15 15  -CK      Exercise 16    Exercise Name 16 HS stretch standing  -CK      Reps 16 2  -CK      Time (Seconds) 16 30  -CK      Exercise 17    Exercise Name 17 standing hip abduction  -CK      Cueing 17 Demo  -CK      Equipment 17 Cuff Weight  -CK      Weights/Plates 17 3  -CK      Reps 17 15  -CK      Exercise 18    Exercise Name 18 Serratus punches  -CK      Cueing 18 Verbal  -CK      Equipment 18 Dumbell  -CK      Weights/Plates 18 5  -CK      Reps 18 20  -CK        User Key  (r) = Recorded By, (t) = Taken By, (c) = Cosigned By    Initials Name Provider Type    CK Mohan Valdivia, PT Physical Therapist                                       Time Calculation:   Start Time: 1300  Stop Time: 1345  Time Calculation (min): 45 min    Therapy Charges for Today     Code Description Service Date Service Provider Modifiers Qty    40240303283 HC PT THER PROC EA 15 MIN 4/21/2017 Mohan Valdivia, PT GP 3                    Mohan Valdivia, PT  4/21/2017

## 2017-04-26 ENCOUNTER — APPOINTMENT (OUTPATIENT)
Dept: PHYSICAL THERAPY | Facility: HOSPITAL | Age: 62
End: 2017-04-26

## 2017-04-28 ENCOUNTER — HOSPITAL ENCOUNTER (OUTPATIENT)
Dept: PHYSICAL THERAPY | Facility: HOSPITAL | Age: 62
Setting detail: THERAPIES SERIES
Discharge: HOME | End: 2017-04-28

## 2017-04-28 DIAGNOSIS — G89.29 CHRONIC PAIN OF BOTH KNEES: Primary | ICD-10-CM

## 2017-04-28 DIAGNOSIS — M75.52 SHOULDER BURSITIS, LEFT: ICD-10-CM

## 2017-04-28 DIAGNOSIS — G89.29 CHRONIC LEFT SHOULDER PAIN: ICD-10-CM

## 2017-04-28 DIAGNOSIS — M17.11 PATELLOFEMORAL ARTHRITIS OF RIGHT KNEE: ICD-10-CM

## 2017-04-28 DIAGNOSIS — M25.561 CHRONIC PAIN OF BOTH KNEES: Primary | ICD-10-CM

## 2017-04-28 DIAGNOSIS — S46.012D STRAIN OF ROTATOR CUFF CAPSULE, LEFT, SUBSEQUENT ENCOUNTER: ICD-10-CM

## 2017-04-28 DIAGNOSIS — M25.562 CHRONIC PAIN OF BOTH KNEES: Primary | ICD-10-CM

## 2017-04-28 DIAGNOSIS — M25.512 CHRONIC LEFT SHOULDER PAIN: ICD-10-CM

## 2017-04-28 PROCEDURE — 97110 THERAPEUTIC EXERCISES: CPT

## 2017-04-28 NOTE — PROGRESS NOTES
Outpatient Physical Therapy Ortho Treatment Note  [facility]     Patient Name: Jose Ramon Murcia  : 1955  MRN: [id number]  Today's Date: 2017      Visit Date: 2017    Visit Dx:    ICD-10-CM ICD-9-CM   1. Chronic pain of both knees M25.561 719.46    M25.562 338.29    G89.29    2. Chronic left shoulder pain M25.512 719.41    G89.29 338.29   3. Shoulder bursitis, left M75.52 726.10   4. Patellofemoral arthritis of right knee M19.90 716.96   5. Strain of rotator cuff capsule, left, subsequent encounter S46.012D V58.89     840.4       Patient Active Problem List   Diagnosis   • Allergic rhinitis   • Cervical spinal stenosis   • ED (erectile dysfunction) of non-organic origin   • BP (high blood pressure)   • Lumbar radiculopathy   • Neoplasm of skin   • Obstructive apnea   • Burning or prickling sensation   • Neck pain   • Benign non-nodular prostatic hyperplasia with lower urinary tract symptoms   • Shoulder pain, acute   • Urinary frequency   • Hyperlipidemia   • Chronic pain of right knee   • Rotator cuff tendonitis        Past Medical History:   Diagnosis Date   • Allergic    • Arthritis    • ED (erectile dysfunction)    • Hypertension         Past Surgical History:   Procedure Laterality Date   • ANAL FISTULOTOMY     • APPENDECTOMY     • COLONOSCOPY     • NECK SURGERY     • WISDOM TOOTH EXTRACTION                               PT Assessment/Plan       17 1339       PT Assessment    Assessment Comments Patient is tolerating increased weight with exercises. Continue to have shoulder pain with particular reaching activity. Patient needing less verbal cuing to perfrom exercises.   -BELA       User Key  (r) = Recorded By, (t) = Taken By, (c) = Cosigned By    Initials Name Provider Type    BELA Ko PTA Physical Therapy Assistant                    Exercises       17 1300          Subjective Comments    Subjective Comments Patient reports feeling better. Had increased knee  pain when sitting in traffic. [place] pain with certain movements  -WS      Subjective Pain    Able to rate subjective pain? yes  -WS      Pre-Treatment Pain Level 2  -WS      Exercise 1    Exercise Name 1 reverse shoulder rolls  -WS      Cueing 1 Verbal;Demo  -WS      Weights/Plates 1 3  -WS      Reps 1 15  -WS      Exercise 3    Exercise Name 3 shoulder ext  -WS      Cueing 3 Verbal;Demo  -WS      Equipment 3 Theraband  -WS      Resistance 3 Green  -WS      Reps 3 15  -WS      Exercise 4    Exercise Name 4 Rows  -WS      Cueing 4 Verbal;Demo  -WS      Equipment 4 Theraband  -WS      Resistance 4 Green  -WS      Reps 4 15  -WS      Exercise 5    Exercise Name 5 wash the wall: flexion and Cw/ccw circles, scaption  -WS      Cueing 5 Verbal  -WS      Reps 5 15  -WS      Exercise 6    Exercise Name 6 1 arm neural stretch at door frame,   -WS      Cueing 6 Verbal  -WS      Reps 6 3  -WS      Time (Seconds) 6 20  -WS      Exercise 7    Exercise Name 7 quad set  -WS      Cueing 7 Verbal  -WS      Reps 7 5  -WS      Time (Seconds) 7 15  -WS      Exercise 8    Exercise Name 8 LAQ  -WS      Cueing 8 Demo  -WS      Weights/Plates 8 4  -WS      Reps 8 15  -WS      Exercise 9    Exercise Name 9 gastroc/soleus stretch on step  -WS      Cueing 9 Demo  -WS      Reps 9 3  -WS      Time (Seconds) 9 30  -WS      Exercise 10    Exercise Name 10 standing quad/hip flexor stretch (can substitue prone hip flexor/quad stretch)  -WS      Cueing 10 Demo  -WS      Reps 10 2  -WS      Time (Seconds) 10 30  -WS      Exercise 11    Exercise Name 11 Nu step L5  -WS      Time (Minutes) 11 5  -WS      Exercise 12    Exercise Name 12 calf raise  -WS      Reps 12 15  -WS      Exercise 13    Exercise Name 13 Hamstring curl margarette  -WS      Weights/Plates 13 4  -WS      Reps 13 15  -WS      Exercise 14    Exercise Name 14 margarette ER  -WS      Resistance 14 Red  -WS      Reps 14 15  -WS      Exercise 15    Exercise Name 15 R ankle 3 way  -WS      Resistance  "15 Green  -WS      Reps 15 15  -WS      Exercise 16    Exercise Name 16 HS stretch standing  -WS      Reps 16 2  -WS      Time (Seconds) 16 30  -WS      Exercise 17    Exercise Name 17 standing hip abduction  -WS      Cueing 17 Demo  -WS      Equipment 17 Cuff Weight  -WS      Weights/Plates 17 4  -WS      Reps 17 15  -WS      Exercise 18    Exercise Name 18 Serratus punches  -WS      Cueing 18 Verbal  -WS      Equipment 18 Dumbell  -WS      Weights/Plates 18 5  -WS      Reps 18 20  -WS        User Key  (r) = Recorded By, (t) = Taken By, (c) = Cosigned By    Initials Name Provider Type    BELA Ko, PTA Physical Therapy Assistant                               PT OP Goals       04/28/17 1300       PT Short Term Goals    STG 1 Patient will be independent and compliant with initial home exercise program  -     STG 1 Progress Met  -     STG 2 Patient will be independent with education for symptom management, joint protection and strategies to minimize stress on affected tissues  -     STG 2 Progress Ongoing  -     Long Term Goals    LTG 1 Patient will be independent and compliant with advanced home exercise program to facilitate self-management of symptoms  -     LTG 2 Patient will increase shoulder flexion L AROM flexion to 0-160 deg, without painful arc, to increase ease of reaching overhead into cabinets, putting on shirt, etc.  -     LTG 3 Patient will be able to reach with L shoulder to L2 for improved IR with donning/doffing belt.  -     LTG 4 Pt. will report L shoulder pain </= 1-2/10 with all daily activities and sleeping.  -     LTG 5 Patient will report 50% improvement with stair negotiation and report no episodes of \"giving way.\"  -     LTG 6 Patient will score >/= 60/80 on Knee Outcome Survery, indicating improved perceived functional ability with daily activities  -     LTG 7 Patient will report ability to sit >/= 30 min without shifting in chair/changing knee positions in " order for him to better perform his job as a [employer] .   -WS       User Key  (r) = Recorded By, (t) = Taken By, (c) = Cosigned By    Initials Name Provider Type    BELA Ko PTA Physical Therapy Assistant                Therapy Education       04/28/17 1331          Therapy Education    Given HEP;Symptoms/condition management;Pain management;Posture/body mechanics  -WS      Program Reinforced  -WS      How Provided Verbal  -WS      Provided to Patient  -WS        User Key  (r) = Recorded By, (t) = Taken By, (c) = Cosigned By    Initials Name Provider Type    BELA Ko PTA Physical Therapy Assistant                Time Calculation:   Start Time: 1500  Stop Time: 1540  Time Calculation (min): 40 min    Therapy Charges for Today     Code Description Service Date Service Provider Modifiers Qty    61317970442 HC PT THER PROC EA 15 MIN 4/28/2017 Jose G Ko PTA GP 3                    Jose G Ko PTA  4/28/2017

## 2017-05-03 ENCOUNTER — HOSPITAL ENCOUNTER (OUTPATIENT)
Dept: PHYSICAL THERAPY | Facility: HOSPITAL | Age: 62
Setting detail: THERAPIES SERIES
Discharge: HOME | End: 2017-05-03

## 2017-05-03 DIAGNOSIS — S46.012D STRAIN OF ROTATOR CUFF CAPSULE, LEFT, SUBSEQUENT ENCOUNTER: ICD-10-CM

## 2017-05-03 DIAGNOSIS — M25.562 CHRONIC PAIN OF BOTH KNEES: Primary | ICD-10-CM

## 2017-05-03 DIAGNOSIS — M25.561 CHRONIC PAIN OF BOTH KNEES: Primary | ICD-10-CM

## 2017-05-03 DIAGNOSIS — M75.52 SHOULDER BURSITIS, LEFT: ICD-10-CM

## 2017-05-03 DIAGNOSIS — G89.29 CHRONIC LEFT SHOULDER PAIN: ICD-10-CM

## 2017-05-03 DIAGNOSIS — M17.11 PATELLOFEMORAL ARTHRITIS OF RIGHT KNEE: ICD-10-CM

## 2017-05-03 DIAGNOSIS — G89.29 CHRONIC PAIN OF BOTH KNEES: Primary | ICD-10-CM

## 2017-05-03 DIAGNOSIS — M25.512 CHRONIC LEFT SHOULDER PAIN: ICD-10-CM

## 2017-05-03 PROCEDURE — 97110 THERAPEUTIC EXERCISES: CPT

## 2017-05-05 ENCOUNTER — HOSPITAL ENCOUNTER (OUTPATIENT)
Dept: PHYSICAL THERAPY | Facility: HOSPITAL | Age: 62
Setting detail: THERAPIES SERIES
Discharge: HOME | End: 2017-05-05

## 2017-05-05 DIAGNOSIS — G89.29 CHRONIC LEFT SHOULDER PAIN: ICD-10-CM

## 2017-05-05 DIAGNOSIS — M75.52 SHOULDER BURSITIS, LEFT: ICD-10-CM

## 2017-05-05 DIAGNOSIS — M25.512 CHRONIC LEFT SHOULDER PAIN: ICD-10-CM

## 2017-05-05 DIAGNOSIS — M25.561 CHRONIC PAIN OF BOTH KNEES: Primary | ICD-10-CM

## 2017-05-05 DIAGNOSIS — G89.29 CHRONIC PAIN OF BOTH KNEES: Primary | ICD-10-CM

## 2017-05-05 DIAGNOSIS — M25.562 CHRONIC PAIN OF BOTH KNEES: Primary | ICD-10-CM

## 2017-05-05 DIAGNOSIS — S46.012D STRAIN OF ROTATOR CUFF CAPSULE, LEFT, SUBSEQUENT ENCOUNTER: ICD-10-CM

## 2017-05-05 DIAGNOSIS — M17.11 PATELLOFEMORAL ARTHRITIS OF RIGHT KNEE: ICD-10-CM

## 2017-05-05 PROCEDURE — 97140 MANUAL THERAPY 1/> REGIONS: CPT | Performed by: PHYSICAL THERAPIST

## 2017-05-05 PROCEDURE — 97110 THERAPEUTIC EXERCISES: CPT | Performed by: PHYSICAL THERAPIST

## 2017-05-10 ENCOUNTER — APPOINTMENT (OUTPATIENT)
Dept: PHYSICAL THERAPY | Facility: HOSPITAL | Age: 62
End: 2017-05-10

## 2017-05-12 ENCOUNTER — APPOINTMENT (OUTPATIENT)
Dept: PHYSICAL THERAPY | Facility: HOSPITAL | Age: 62
End: 2017-05-12

## 2017-05-17 ENCOUNTER — HOSPITAL ENCOUNTER (OUTPATIENT)
Dept: PHYSICAL THERAPY | Facility: HOSPITAL | Age: 62
Setting detail: THERAPIES SERIES
Discharge: HOME | End: 2017-05-17

## 2017-05-17 DIAGNOSIS — M25.561 CHRONIC PAIN OF BOTH KNEES: Primary | ICD-10-CM

## 2017-05-17 DIAGNOSIS — M75.52 SHOULDER BURSITIS, LEFT: ICD-10-CM

## 2017-05-17 DIAGNOSIS — G89.29 CHRONIC PAIN OF BOTH KNEES: Primary | ICD-10-CM

## 2017-05-17 DIAGNOSIS — G89.29 CHRONIC LEFT SHOULDER PAIN: ICD-10-CM

## 2017-05-17 DIAGNOSIS — M25.512 CHRONIC LEFT SHOULDER PAIN: ICD-10-CM

## 2017-05-17 DIAGNOSIS — S46.012D STRAIN OF ROTATOR CUFF CAPSULE, LEFT, SUBSEQUENT ENCOUNTER: ICD-10-CM

## 2017-05-17 DIAGNOSIS — M25.562 CHRONIC PAIN OF BOTH KNEES: Primary | ICD-10-CM

## 2017-05-17 DIAGNOSIS — M17.11 PATELLOFEMORAL ARTHRITIS OF RIGHT KNEE: ICD-10-CM

## 2017-05-17 PROCEDURE — 97140 MANUAL THERAPY 1/> REGIONS: CPT

## 2017-05-17 PROCEDURE — 97110 THERAPEUTIC EXERCISES: CPT

## 2017-05-19 ENCOUNTER — HOSPITAL ENCOUNTER (OUTPATIENT)
Dept: PHYSICAL THERAPY | Facility: HOSPITAL | Age: 62
Setting detail: THERAPIES SERIES
Discharge: HOME | End: 2017-05-19

## 2017-05-19 DIAGNOSIS — M17.11 PATELLOFEMORAL ARTHRITIS OF RIGHT KNEE: ICD-10-CM

## 2017-05-19 DIAGNOSIS — M25.512 CHRONIC LEFT SHOULDER PAIN: ICD-10-CM

## 2017-05-19 DIAGNOSIS — M75.52 SHOULDER BURSITIS, LEFT: ICD-10-CM

## 2017-05-19 DIAGNOSIS — M25.561 CHRONIC PAIN OF BOTH KNEES: Primary | ICD-10-CM

## 2017-05-19 DIAGNOSIS — G89.29 CHRONIC PAIN OF BOTH KNEES: Primary | ICD-10-CM

## 2017-05-19 DIAGNOSIS — G89.29 CHRONIC LEFT SHOULDER PAIN: ICD-10-CM

## 2017-05-19 DIAGNOSIS — M25.562 CHRONIC PAIN OF BOTH KNEES: Primary | ICD-10-CM

## 2017-05-19 DIAGNOSIS — S46.012D STRAIN OF ROTATOR CUFF CAPSULE, LEFT, SUBSEQUENT ENCOUNTER: ICD-10-CM

## 2017-05-19 PROCEDURE — 97110 THERAPEUTIC EXERCISES: CPT

## 2017-05-24 ENCOUNTER — APPOINTMENT (OUTPATIENT)
Dept: PHYSICAL THERAPY | Facility: HOSPITAL | Age: 62
End: 2017-05-24

## 2017-05-26 ENCOUNTER — HOSPITAL ENCOUNTER (OUTPATIENT)
Dept: PHYSICAL THERAPY | Facility: HOSPITAL | Age: 62
Setting detail: THERAPIES SERIES
Discharge: HOME | End: 2017-05-26

## 2017-05-26 DIAGNOSIS — M75.52 SHOULDER BURSITIS, LEFT: ICD-10-CM

## 2017-05-26 DIAGNOSIS — S46.012D STRAIN OF ROTATOR CUFF CAPSULE, LEFT, SUBSEQUENT ENCOUNTER: ICD-10-CM

## 2017-05-26 DIAGNOSIS — M25.561 CHRONIC PAIN OF BOTH KNEES: Primary | ICD-10-CM

## 2017-05-26 DIAGNOSIS — G89.29 CHRONIC LEFT SHOULDER PAIN: ICD-10-CM

## 2017-05-26 DIAGNOSIS — M25.562 CHRONIC PAIN OF BOTH KNEES: Primary | ICD-10-CM

## 2017-05-26 DIAGNOSIS — M17.11 PATELLOFEMORAL ARTHRITIS OF RIGHT KNEE: ICD-10-CM

## 2017-05-26 DIAGNOSIS — G89.29 CHRONIC PAIN OF BOTH KNEES: Primary | ICD-10-CM

## 2017-05-26 DIAGNOSIS — M25.512 CHRONIC LEFT SHOULDER PAIN: ICD-10-CM

## 2017-05-26 PROCEDURE — 97110 THERAPEUTIC EXERCISES: CPT | Performed by: PHYSICAL THERAPIST

## 2017-05-26 PROCEDURE — 97010 HOT OR COLD PACKS THERAPY: CPT | Performed by: PHYSICAL THERAPIST

## 2017-05-26 PROCEDURE — 97140 MANUAL THERAPY 1/> REGIONS: CPT | Performed by: PHYSICAL THERAPIST

## 2017-05-31 ENCOUNTER — APPOINTMENT (OUTPATIENT)
Dept: PHYSICAL THERAPY | Facility: HOSPITAL | Age: 62
End: 2017-05-31

## 2017-06-01 ENCOUNTER — HOSPITAL ENCOUNTER (EMERGENCY)
Facility: HOSPITAL | Age: 62
Discharge: HOME | End: 2017-06-01
Attending: FAMILY MEDICINE | Admitting: FAMILY MEDICINE

## 2017-06-01 ENCOUNTER — APPOINTMENT (OUTPATIENT)
Dept: CT IMAGING | Facility: HOSPITAL | Age: 62
End: 2017-06-01

## 2017-06-01 VITALS
BODY MASS INDEX: 35.43 KG/M2 | TEMPERATURE: 96 F | DIASTOLIC BLOOD PRESSURE: 88 MMHG | WEIGHT: 285 LBS | SYSTOLIC BLOOD PRESSURE: 136 MMHG | OXYGEN SATURATION: 94 % | HEART RATE: 57 BPM | HEIGHT: 75 IN | RESPIRATION RATE: 14 BRPM

## 2017-06-01 DIAGNOSIS — N20.1 URETERAL STONE: Primary | ICD-10-CM

## 2017-06-01 LAB
ALBUMIN SERPL-MCNC: 4.1 G/DL (ref 3.5–5.2)
ALBUMIN/GLOB SERPL: 1.3 G/DL
ALP SERPL-CCNC: 80 U/L (ref 39–117)
ALT SERPL W P-5'-P-CCNC: 37 U/L (ref 1–41)
ANION GAP SERPL CALCULATED.3IONS-SCNC: 12 MMOL/L
AST SERPL-CCNC: 23 U/L (ref 1–40)
BACTERIA UR QL AUTO: ABNORMAL /HPF
BASOPHILS # BLD AUTO: 0.03 10*3/MM3 (ref 0–0.2)
BASOPHILS NFR BLD AUTO: 0.4 % (ref 0–1.5)
BILIRUB SERPL-MCNC: 0.5 MG/DL (ref 0.1–1.2)
BILIRUB UR QL STRIP: NEGATIVE
BUN BLD-MCNC: 12 MG/DL (ref 8–23)
BUN/CREAT SERPL: 11.7 (ref 7–25)
CALCIUM SPEC-SCNC: 9.3 MG/DL (ref 8.6–10.5)
CHLORIDE SERPL-SCNC: 102 MMOL/L (ref 98–107)
CLARITY UR: CLEAR
CO2 SERPL-SCNC: 26 MMOL/L (ref 22–29)
COLOR UR: YELLOW
CREAT BLD-MCNC: 1.03 MG/DL (ref 0.76–1.27)
D-LACTATE SERPL-SCNC: 1.1 MMOL/L (ref 0.5–2)
DEPRECATED RDW RBC AUTO: 46.7 FL (ref 37–54)
EOSINOPHIL # BLD AUTO: 0.21 10*3/MM3 (ref 0–0.7)
EOSINOPHIL NFR BLD AUTO: 3.1 % (ref 0.3–6.2)
ERYTHROCYTE [DISTWIDTH] IN BLOOD BY AUTOMATED COUNT: 14.6 % (ref 11.5–14.5)
GFR SERPL CREATININE-BSD FRML MDRD: 74 ML/MIN/1.73
GLOBULIN UR ELPH-MCNC: 3.1 GM/DL
GLUCOSE BLD-MCNC: 156 MG/DL (ref 65–99)
GLUCOSE UR STRIP-MCNC: NEGATIVE MG/DL
HCT VFR BLD AUTO: 46.5 % (ref 40.4–52.2)
HGB BLD-MCNC: 16.3 G/DL (ref 13.7–17.6)
HGB UR QL STRIP.AUTO: ABNORMAL
HOLD SPECIMEN: NORMAL
HOLD SPECIMEN: NORMAL
HYALINE CASTS UR QL AUTO: ABNORMAL /LPF
IMM GRANULOCYTES # BLD: 0 10*3/MM3 (ref 0–0.03)
IMM GRANULOCYTES NFR BLD: 0 % (ref 0–0.5)
KETONES UR QL STRIP: NEGATIVE
LEUKOCYTE ESTERASE UR QL STRIP.AUTO: NEGATIVE
LIPASE SERPL-CCNC: 42 U/L (ref 13–60)
LYMPHOCYTES # BLD AUTO: 1.24 10*3/MM3 (ref 0.9–4.8)
LYMPHOCYTES NFR BLD AUTO: 18.2 % (ref 19.6–45.3)
MAGNESIUM SERPL-MCNC: 2.2 MG/DL (ref 1.6–2.4)
MCH RBC QN AUTO: 30.7 PG (ref 27–32.7)
MCHC RBC AUTO-ENTMCNC: 35.1 G/DL (ref 32.6–36.4)
MCV RBC AUTO: 87.6 FL (ref 79.8–96.2)
MONOCYTES # BLD AUTO: 0.42 10*3/MM3 (ref 0.2–1.2)
MONOCYTES NFR BLD AUTO: 6.1 % (ref 5–12)
NEUTROPHILS # BLD AUTO: 4.93 10*3/MM3 (ref 1.9–8.1)
NEUTROPHILS NFR BLD AUTO: 72.2 % (ref 42.7–76)
NITRITE UR QL STRIP: NEGATIVE
PH UR STRIP.AUTO: 6.5 [PH] (ref 5–8)
PLATELET # BLD AUTO: 154 10*3/MM3 (ref 140–500)
PMV BLD AUTO: 10.7 FL (ref 6–12)
POTASSIUM BLD-SCNC: 3.8 MMOL/L (ref 3.5–5.2)
PROT SERPL-MCNC: 7.2 G/DL (ref 6–8.5)
PROT UR QL STRIP: NEGATIVE
RBC # BLD AUTO: 5.31 10*6/MM3 (ref 4.6–6)
RBC # UR: ABNORMAL /HPF
REF LAB TEST METHOD: ABNORMAL
SODIUM BLD-SCNC: 140 MMOL/L (ref 136–145)
SP GR UR STRIP: 1.02 (ref 1–1.03)
SQUAMOUS #/AREA URNS HPF: ABNORMAL /HPF
TROPONIN T SERPL-MCNC: <0.01 NG/ML (ref 0–0.03)
UROBILINOGEN UR QL STRIP: ABNORMAL
WBC NRBC COR # BLD: 6.83 10*3/MM3 (ref 4.5–10.7)
WBC UR QL AUTO: ABNORMAL /HPF
WHOLE BLOOD HOLD SPECIMEN: NORMAL
WHOLE BLOOD HOLD SPECIMEN: NORMAL

## 2017-06-01 PROCEDURE — 36415 COLL VENOUS BLD VENIPUNCTURE: CPT

## 2017-06-01 PROCEDURE — 80053 COMPREHEN METABOLIC PANEL: CPT | Performed by: FAMILY MEDICINE

## 2017-06-01 PROCEDURE — 25010000002 ONDANSETRON PER 1 MG: Performed by: FAMILY MEDICINE

## 2017-06-01 PROCEDURE — 99284 EMERGENCY DEPT VISIT MOD MDM: CPT

## 2017-06-01 PROCEDURE — 83605 ASSAY OF LACTIC ACID: CPT | Performed by: FAMILY MEDICINE

## 2017-06-01 PROCEDURE — 96361 HYDRATE IV INFUSION ADD-ON: CPT

## 2017-06-01 PROCEDURE — 83690 ASSAY OF LIPASE: CPT | Performed by: FAMILY MEDICINE

## 2017-06-01 PROCEDURE — 81001 URINALYSIS AUTO W/SCOPE: CPT | Performed by: FAMILY MEDICINE

## 2017-06-01 PROCEDURE — 96376 TX/PRO/DX INJ SAME DRUG ADON: CPT

## 2017-06-01 PROCEDURE — 84484 ASSAY OF TROPONIN QUANT: CPT | Performed by: FAMILY MEDICINE

## 2017-06-01 PROCEDURE — 96374 THER/PROPH/DIAG INJ IV PUSH: CPT

## 2017-06-01 PROCEDURE — 74176 CT ABD & PELVIS W/O CONTRAST: CPT

## 2017-06-01 PROCEDURE — 85025 COMPLETE CBC W/AUTO DIFF WBC: CPT | Performed by: FAMILY MEDICINE

## 2017-06-01 PROCEDURE — 25010000002 HYDROMORPHONE PER 4 MG: Performed by: FAMILY MEDICINE

## 2017-06-01 PROCEDURE — 96375 TX/PRO/DX INJ NEW DRUG ADDON: CPT

## 2017-06-01 PROCEDURE — 83735 ASSAY OF MAGNESIUM: CPT | Performed by: FAMILY MEDICINE

## 2017-06-01 PROCEDURE — 93010 ELECTROCARDIOGRAM REPORT: CPT | Performed by: INTERNAL MEDICINE

## 2017-06-01 PROCEDURE — 93005 ELECTROCARDIOGRAM TRACING: CPT | Performed by: FAMILY MEDICINE

## 2017-06-01 RX ORDER — OXYCODONE HYDROCHLORIDE AND ACETAMINOPHEN 5; 325 MG/1; MG/1
1 TABLET ORAL EVERY 4 HOURS PRN
Qty: 20 TABLET | Refills: 0 | Status: SHIPPED | OUTPATIENT
Start: 2017-06-01 | End: 2017-07-19

## 2017-06-01 RX ORDER — ONDANSETRON 2 MG/ML
4 INJECTION INTRAMUSCULAR; INTRAVENOUS ONCE
Status: COMPLETED | OUTPATIENT
Start: 2017-06-01 | End: 2017-06-01

## 2017-06-01 RX ORDER — SODIUM CHLORIDE 0.9 % (FLUSH) 0.9 %
10 SYRINGE (ML) INJECTION AS NEEDED
Status: DISCONTINUED | OUTPATIENT
Start: 2017-06-01 | End: 2017-06-01 | Stop reason: HOSPADM

## 2017-06-01 RX ADMIN — ONDANSETRON 4 MG: 2 INJECTION INTRAMUSCULAR; INTRAVENOUS at 09:56

## 2017-06-01 RX ADMIN — HYDROMORPHONE HYDROCHLORIDE 1 MG: 1 INJECTION, SOLUTION INTRAMUSCULAR; INTRAVENOUS; SUBCUTANEOUS at 09:57

## 2017-06-01 RX ADMIN — SODIUM CHLORIDE 1000 ML: 9 INJECTION, SOLUTION INTRAVENOUS at 10:06

## 2017-06-01 RX ADMIN — HYDROMORPHONE HYDROCHLORIDE 1 MG: 1 INJECTION, SOLUTION INTRAMUSCULAR; INTRAVENOUS; SUBCUTANEOUS at 10:51

## 2017-06-01 NOTE — ED NOTES
Pt continues to moan with pain, reports that the pain is in his testicle now.  Additional pain medication given.     Nilam Layton RN  06/01/17 2193

## 2017-06-01 NOTE — ED PROVIDER NOTES
" EMERGENCY DEPARTMENT ENCOUNTER    CHIEF COMPLAINT  Chief Complaint: Abdominal Pain  History given by: Patient  History limited by: Nothing  Room Number: 29/29  PMD: Rigoberto Solorzano MD      HPI:  Pt is a 62 y.o. male with h/o kidney stones, presents to the ED after the patient had a mild near-syncopal episode earlier this morning secondary to the \"severe\" left sided abdominal pain. He notes that he developed cold sweats and became lightheaded but he never experienced complete loss of consciousness. The patient reports that he's experienced gradually worsening, waxing and waning left sided abdominal pain which he believes attributed to his near syncopal episode this morning. Patient reports that he's experienced some urinary frequency over the past several nights but denies any nausea, vomiting, diarrhea, hematuria, back pain or flank pain. He denies CP or SOA.  He notes that his previous episode of kidney stones although the sensation was \"different\" at that time.  No other complaints at this time.      Duration:  2 hours  Onset: Sudden  Timing: Waxing and waning  Location: Left abdomen  Radiation: None  Quality: Sharp  Intensity/Severity:\"Severe\"  Progression: No Change  Associated Symptoms: Abdominal pain, near syncope, lightheaded, diaphoresis, chills, urinary frequency   Aggravating Factors: Palpation  Alleviating Factors: Rest  Previous Episodes: H/o Kidney stones  Treatment before arrival: None    PAST MEDICAL HISTORY  Active Ambulatory Problems     Diagnosis Date Noted   • Allergic rhinitis 02/24/2016   • Cervical spinal stenosis 02/24/2016   • ED (erectile dysfunction) of non-organic origin 02/24/2016   • BP (high blood pressure) 02/24/2016   • Lumbar radiculopathy 02/24/2016   • Neoplasm of skin 02/24/2016   • Obstructive apnea 02/24/2016   • Burning or prickling sensation 02/24/2016   • Neck pain 03/04/2016   • Benign non-nodular prostatic hyperplasia with lower urinary tract symptoms 03/04/2016   • Shoulder " pain, acute 03/04/2016   • Urinary frequency 04/15/2016   • Hyperlipidemia 04/15/2016   • Chronic pain of right knee 02/01/2017   • Rotator cuff tendonitis 03/24/2017     Resolved Ambulatory Problems     Diagnosis Date Noted   • No Resolved Ambulatory Problems     Past Medical History:   Diagnosis Date   • Allergic    • Arthritis    • ED (erectile dysfunction)    • Hypertension        PAST SURGICAL HISTORY  Past Surgical History:   Procedure Laterality Date   • ANAL FISTULOTOMY     • APPENDECTOMY     • COLONOSCOPY     • NECK SURGERY     • WISDOM TOOTH EXTRACTION         FAMILY HISTORY  Family History   Problem Relation Age of Onset   • Diabetes Mother    • Thyroid disease Mother    • Hypertension Mother    • Diabetes Father    • Heart disease Father    • Hypertension Father    • Atrial fibrillation Father    • Cancer Father    • Stroke Maternal Grandfather    • Stroke Paternal Grandfather        SOCIAL HISTORY  Social History     Social History   • Marital status:      Spouse name: N/A   • Number of children: N/A   • Years of education: N/A     Occupational History   • Not on file.     Social History Main Topics   • Smoking status: Never Smoker   • Smokeless tobacco: Never Used   • Alcohol use Yes      Comment: social   • Drug use: No   • Sexual activity: Defer     Other Topics Concern   • Not on file     Social History Narrative       ALLERGIES  Clarithromycin and Sulfa antibiotics    REVIEW OF SYSTEMS  Review of Systems   Constitutional: Positive for chills and diaphoresis.   HENT: Negative for congestion, rhinorrhea and sore throat.    Eyes: Negative for pain.   Respiratory: Negative for cough and shortness of breath.    Cardiovascular: Negative for chest pain, palpitations and leg swelling.   Gastrointestinal: Negative for abdominal pain, diarrhea, nausea and vomiting.   Genitourinary: Positive for frequency. Negative for difficulty urinating, dysuria and flank pain.   Musculoskeletal: Negative for  myalgias, neck pain and neck stiffness.   Skin: Negative for rash.   Neurological: Positive for syncope (near) and light-headedness. Negative for dizziness, speech difficulty, weakness, numbness and headaches.   Psychiatric/Behavioral: Negative.    All other systems reviewed and are negative.      PHYSICAL EXAM  ED Triage Vitals   Temp Heart Rate Resp BP SpO2   06/01/17 0923 06/01/17 0923 06/01/17 0923 06/01/17 0923 06/01/17 0923   96 °F (35.6 °C) 62 22 162/99 98 %      Temp src Heart Rate Source Patient Position BP Location FiO2 (%)   06/01/17 0923 06/01/17 0923 06/01/17 0923 06/01/17 0923 --   Tympanic Monitor Sitting Right arm        Physical Exam   Constitutional: He is oriented to person, place, and time. He appears distressed (Moderate).   HENT:   Head: Normocephalic and atraumatic.   Eyes: EOM are normal. Pupils are equal, round, and reactive to light.   Neck: Normal range of motion. Neck supple.   Cardiovascular: Normal rate, regular rhythm and normal heart sounds.    Pulmonary/Chest: Effort normal and breath sounds normal. No respiratory distress.   Abdominal: Soft. There is no tenderness. There is no rebound and no guarding.   Musculoskeletal: Normal range of motion. He exhibits no edema.   Neurological: He is alert and oriented to person, place, and time. He has normal sensation and normal strength.   Skin: Skin is warm and dry.   Psychiatric: Mood and affect normal.   Nursing note and vitals reviewed.      LAB RESULTS  Lab Results (last 24 hours)     Procedure Component Value Units Date/Time    CBC & Differential [[id number]] Collected:  06/01/17 0936    Specimen:  Blood Updated:  06/01/17 0951    Narrative:       The following orders were created for panel order CBC & Differential.  Procedure                               Abnormality         Status                     ---------                               -----------         ------                     CBC Auto Differential[[id number]]        Abnormal             Final result                 Please view results for these tests on the individual orders.    Comprehensive Metabolic Panel [[id number]]  (Abnormal) Collected:  06/01/17 0936    Specimen:  Blood Updated:  06/01/17 1028     Glucose 156 (H) mg/dL      BUN 12 mg/dL      Creatinine 1.03 mg/dL      Sodium 140 mmol/L      Potassium 3.8 mmol/L      Chloride 102 mmol/L      CO2 26.0 mmol/L      Calcium 9.3 mg/dL      Total Protein 7.2 g/dL      Albumin 4.10 g/dL      ALT (SGPT) 37 U/L      AST (SGOT) 23 U/L      Alkaline Phosphatase 80 U/L      Total Bilirubin 0.5 mg/dL      eGFR Non African Amer 74 mL/min/1.73      Globulin 3.1 gm/dL      A/G Ratio 1.3 g/dL      BUN/Creatinine Ratio 11.7     Anion Gap 12.0 mmol/L     Magnesium [[id number]]  (Normal) Collected:  06/01/17 0936    Specimen:  Blood Updated:  06/01/17 1028     Magnesium 2.2 mg/dL     Troponin [[id number]]  (Normal) Collected:  06/01/17 0936    Specimen:  Blood Updated:  06/01/17 1028     Troponin T <0.010 ng/mL     Narrative:       Troponin T Reference Ranges:  Less than 0.03 ng/mL:    Negative for AMI  0.03 to 0.09 ng/mL:      Indeterminant for AMI  Greater than 0.09 ng/mL: Positive for AMI    Lipase [[id number]]  (Normal) Collected:  06/01/17 0936    Specimen:  Blood Updated:  06/01/17 1028     Lipase 42 U/L     Lactic Acid, Plasma [[id number]]  (Normal) Collected:  06/01/17 0936    Specimen:  Blood Updated:  06/01/17 1016     Lactate 1.1 mmol/L     CBC Auto Differential [[id number]]  (Abnormal) Collected:  06/01/17 0936    Specimen:  Blood Updated:  06/01/17 0951     WBC 6.83 10*3/mm3      RBC 5.31 10*6/mm3      Hemoglobin 16.3 g/dL      Hematocrit 46.5 %      MCV 87.6 fL      MCH 30.7 pg      MCHC 35.1 g/dL      RDW 14.6 (H) %      RDW-SD 46.7 fl      MPV 10.7 fL      Platelets 154 10*3/mm3      Neutrophil % 72.2 %      Lymphocyte % 18.2 (L) %      Monocyte % 6.1 %      Eosinophil % 3.1 %      Basophil % 0.4 %      Immature Grans % 0.0 %      Neutrophils,  Absolute 4.93 10*3/mm3      Lymphocytes, Absolute 1.24 10*3/mm3      Monocytes, Absolute 0.42 10*3/mm3      Eosinophils, Absolute 0.21 10*3/mm3      Basophils, Absolute 0.03 10*3/mm3      Immature Grans, Absolute 0.00 10*3/mm3     Urinalysis With / Culture If Indicated [[id number]]  (Abnormal) Collected:  06/01/17 1132    Specimen:  Urine from Urine, Clean Catch Updated:  06/01/17 1150     Color, UA Yellow     Appearance, UA Clear     pH, UA 6.5     Specific Gravity, UA 1.016     Glucose, UA Negative     Ketones, UA Negative     Bilirubin, UA Negative     Blood, UA Moderate (2+) (A)     Protein, UA Negative     Leuk Esterase, UA Negative     Nitrite, UA Negative     Urobilinogen, UA 1.0 E.U./dL    Urinalysis, Microscopic Only [[id number]]  (Abnormal) Collected:  06/01/17 1132    Specimen:  Urine from Urine, Clean Catch Updated:  06/01/17 1150     RBC, UA 31-50 (A) /HPF      WBC, UA 0-2 /HPF      Bacteria, UA None Seen /HPF      Squamous Epithelial Cells, UA 0-2 /HPF      Hyaline Casts, UA None Seen /LPF      Methodology Automated Microscopy          I ordered the above labs and reviewed the results    RADIOLOGY  CT Abdomen Pelvis Without Contrast   Preliminary Result   1. A 4 mm right midureteric calculus causing mild hydroureteronephrosis   and right perinephric stranding. Additional tiny punctate calculi are   seen within the upper and midpole of the right kidney.   2. Stable bilateral renal cysts, the one on the left demonstrates   peripheral rim calcification.   3. Colonic diverticulosis.   4. Diffuse hepatic steatosis.       These findings were discussed with Dr. Guallpa by telephone at 10:36 AM   on 06/01/2017.             11:03  Reviewed CT abd/pel - 4mm stone at the mid ureter with mild hydronephrosis. Independently viewed by me. Interpreted by radiologist. Discussed with Radiologist.    I ordered the above noted radiological studies. Interpreted by radiologist. Discussed with radiologist. Reviewed by me in  PACS.       PROCEDURES  Procedures    EKG           EKG time: 10:23  Rhythm/Rate: NSR 58  P waves and MT: Normal, First degree AV block  QRS, axis: LAFB   ST and T waves: Non specific ST-T wave changes      Interpreted Contemporaneously by me, independently viewed  No sig change compared to prior 11/21/14      PROGRESS AND CONSULTS  ED Course     09:32  Ordered EKG, labs and CT abd/pel for further evaluation.     09:42  Ordered IVF for hydration, Zofran for nausea and Dilaudid for pain control.     10:47  Ordered another dose of Dilaudid for continued pain control.     11:06  Rechecked patient and they are resting more comfortably. Discussed pertinent imaging results, including CT abd/pel shows a 4 mm stone in the mid ureter with mild hydronephrosis. Discussed plan to consult Urology.     12:25  Discussed case with Dr. Redding (Urology). Reviewed history, exam, results and treatments. Discussed concerns and plan of care.     12:29  Discussed the plan to discharge the patient home with Flomax, pain medication and a urine strainer. I explained that he should follow up with a Urologist or return to the ED if his pain worsens or if he is unable to pass the stone. Patient agrees with plan and all questions were addressed.     Latest vital signs   BP- 167/91 HR- 61 Temp- 96 °F (35.6 °C) (Tympanic) O2 sat- 96%      MEDICAL DECISION MAKING  Results were reviewed/discussed with the patient and they were also made aware of online access. Pt also made aware that some labs, such as cultures, will not be resulted during ER visit and follow up with PMD is necessary.     MDM  Number of Diagnoses or Management Options  Ureteral stone:      Amount and/or Complexity of Data Reviewed  Clinical lab tests: ordered and reviewed (UA 31-50 RBC's)  Tests in the radiology section of CPT®: reviewed and ordered (CT abd/pel - 4mm stone at the mid ureter with mild hydronephrosis.)  Tests in the medicine section of CPT®: reviewed and ordered (See  EKG)  Discussion of test results with the performing providers: yes           DIAGNOSIS  Final diagnoses:   Ureteral stone       DISPOSITION  DISCHARGE    Patient discharged in stable condition.    Reviewed implications of results, diagnosis, meds, responsibility to follow up, warning signs and symptoms of possible worsening, potential complications and reasons to return to ER, including worsening flank pain or development of fever.     Patient/Family voiced understanding of above instructions.    Discussed plan for discharge, as there is no emergent indication for admission.  Pt/family is agreeable and understands need for follow up and repeat testing.  Pt is aware that discharge does not mean that nothing is wrong but it indicates no emergency is present that requires admission and they must continue care with follow-up as given below or physician of their choice.     FOLLOW-UP  Samy Redding Jr., MD  [address] [id number] [phone]      Call in the AM for an appt next week with a [employer]         Medication List      New Prescriptions          oxyCODONE-acetaminophen 5-325 MG per tablet   Commonly known as:  PERCOCET   Take 1 tablet by mouth Every 4 (Four) Hours As Needed (pain).               Latest Documented Vital Signs:  As of 12:28 PM  BP- 167/91 HR- 61 Temp- 96 °F (35.6 °C) (Tympanic) O2 sat- 96%    --  Documentation assistance provided by radha Cobb for .  Information recorded by the scribe was done at my direction and has been verified and validated by me.        Rene Cobb  06/01/17 2191       Master Guallpa MD  06/01/17 0110

## 2017-06-02 PROCEDURE — 99284 EMERGENCY DEPT VISIT MOD MDM: CPT

## 2017-06-02 RX ORDER — ONDANSETRON 4 MG/1
4 TABLET, FILM COATED ORAL EVERY 8 HOURS PRN
COMMUNITY
End: 2017-07-19

## 2017-06-03 ENCOUNTER — APPOINTMENT (OUTPATIENT)
Dept: GENERAL RADIOLOGY | Facility: HOSPITAL | Age: 62
End: 2017-06-03

## 2017-06-03 ENCOUNTER — HOSPITAL ENCOUNTER (EMERGENCY)
Facility: HOSPITAL | Age: 62
Discharge: HOME | End: 2017-06-03
Attending: EMERGENCY MEDICINE | Admitting: EMERGENCY MEDICINE

## 2017-06-03 ENCOUNTER — APPOINTMENT (OUTPATIENT)
Dept: CT IMAGING | Facility: HOSPITAL | Age: 62
End: 2017-06-03

## 2017-06-03 VITALS
SYSTOLIC BLOOD PRESSURE: 152 MMHG | HEIGHT: 71 IN | RESPIRATION RATE: 18 BRPM | DIASTOLIC BLOOD PRESSURE: 89 MMHG | TEMPERATURE: 97.2 F | WEIGHT: 280 LBS | BODY MASS INDEX: 39.2 KG/M2 | HEART RATE: 67 BPM | OXYGEN SATURATION: 96 %

## 2017-06-03 DIAGNOSIS — N20.0 KIDNEY STONE: Primary | ICD-10-CM

## 2017-06-03 LAB
ALBUMIN SERPL-MCNC: 3.7 G/DL (ref 3.5–5.2)
ALBUMIN/GLOB SERPL: 1.1 G/DL
ALP SERPL-CCNC: 81 U/L (ref 39–117)
ALT SERPL W P-5'-P-CCNC: 25 U/L (ref 1–41)
ANION GAP SERPL CALCULATED.3IONS-SCNC: 13.5 MMOL/L
AST SERPL-CCNC: 13 U/L (ref 1–40)
BACTERIA UR QL AUTO: NORMAL /HPF
BASOPHILS # BLD AUTO: 0.01 10*3/MM3 (ref 0–0.2)
BASOPHILS NFR BLD AUTO: 0.1 % (ref 0–1.5)
BILIRUB SERPL-MCNC: 0.4 MG/DL (ref 0.1–1.2)
BILIRUB UR QL STRIP: NEGATIVE
BUN BLD-MCNC: 8 MG/DL (ref 8–23)
BUN/CREAT SERPL: 6 (ref 7–25)
CALCIUM SPEC-SCNC: 8.6 MG/DL (ref 8.6–10.5)
CHLORIDE SERPL-SCNC: 99 MMOL/L (ref 98–107)
CLARITY UR: CLEAR
CO2 SERPL-SCNC: 23.5 MMOL/L (ref 22–29)
COLOR UR: ABNORMAL
CREAT BLD-MCNC: 1.33 MG/DL (ref 0.76–1.27)
DEPRECATED RDW RBC AUTO: 47.6 FL (ref 37–54)
EOSINOPHIL # BLD AUTO: 0.14 10*3/MM3 (ref 0–0.7)
EOSINOPHIL NFR BLD AUTO: 1.3 % (ref 0.3–6.2)
ERYTHROCYTE [DISTWIDTH] IN BLOOD BY AUTOMATED COUNT: 15 % (ref 11.5–14.5)
GFR SERPL CREATININE-BSD FRML MDRD: 55 ML/MIN/1.73
GLOBULIN UR ELPH-MCNC: 3.3 GM/DL
GLUCOSE BLD-MCNC: 145 MG/DL (ref 65–99)
GLUCOSE UR STRIP-MCNC: NEGATIVE MG/DL
HCT VFR BLD AUTO: 46.8 % (ref 40.4–52.2)
HGB BLD-MCNC: 16 G/DL (ref 13.7–17.6)
HGB UR QL STRIP.AUTO: ABNORMAL
HYALINE CASTS UR QL AUTO: NORMAL /LPF
IMM GRANULOCYTES # BLD: 0.02 10*3/MM3 (ref 0–0.03)
IMM GRANULOCYTES NFR BLD: 0.2 % (ref 0–0.5)
KETONES UR QL STRIP: NEGATIVE
LEUKOCYTE ESTERASE UR QL STRIP.AUTO: NEGATIVE
LYMPHOCYTES # BLD AUTO: 1.24 10*3/MM3 (ref 0.9–4.8)
LYMPHOCYTES NFR BLD AUTO: 11.7 % (ref 19.6–45.3)
MCH RBC QN AUTO: 30.4 PG (ref 27–32.7)
MCHC RBC AUTO-ENTMCNC: 34.2 G/DL (ref 32.6–36.4)
MCV RBC AUTO: 88.8 FL (ref 79.8–96.2)
MONOCYTES # BLD AUTO: 0.82 10*3/MM3 (ref 0.2–1.2)
MONOCYTES NFR BLD AUTO: 7.7 % (ref 5–12)
NEUTROPHILS # BLD AUTO: 8.37 10*3/MM3 (ref 1.9–8.1)
NEUTROPHILS NFR BLD AUTO: 79 % (ref 42.7–76)
NITRITE UR QL STRIP: NEGATIVE
PH UR STRIP.AUTO: 5.5 [PH] (ref 5–8)
PLATELET # BLD AUTO: 167 10*3/MM3 (ref 140–500)
PMV BLD AUTO: 11.1 FL (ref 6–12)
POTASSIUM BLD-SCNC: 3.6 MMOL/L (ref 3.5–5.2)
PROT SERPL-MCNC: 7 G/DL (ref 6–8.5)
PROT UR QL STRIP: NEGATIVE
RBC # BLD AUTO: 5.27 10*6/MM3 (ref 4.6–6)
RBC # UR: NORMAL /HPF
REF LAB TEST METHOD: NORMAL
SODIUM BLD-SCNC: 136 MMOL/L (ref 136–145)
SP GR UR STRIP: 1.01 (ref 1–1.03)
SQUAMOUS #/AREA URNS HPF: NORMAL /HPF
UROBILINOGEN UR QL STRIP: ABNORMAL
WBC NRBC COR # BLD: 10.6 10*3/MM3 (ref 4.5–10.7)
WBC UR QL AUTO: NORMAL /HPF

## 2017-06-03 PROCEDURE — 74000 HC ABDOMEN KUB: CPT

## 2017-06-03 PROCEDURE — 0 IOPAMIDOL 61 % SOLUTION: Performed by: EMERGENCY MEDICINE

## 2017-06-03 PROCEDURE — 74177 CT ABD & PELVIS W/CONTRAST: CPT

## 2017-06-03 PROCEDURE — 85025 COMPLETE CBC W/AUTO DIFF WBC: CPT | Performed by: EMERGENCY MEDICINE

## 2017-06-03 PROCEDURE — 25010000002 HYDROMORPHONE PER 4 MG: Performed by: EMERGENCY MEDICINE

## 2017-06-03 PROCEDURE — 96361 HYDRATE IV INFUSION ADD-ON: CPT

## 2017-06-03 PROCEDURE — 25010000002 ONDANSETRON PER 1 MG: Performed by: EMERGENCY MEDICINE

## 2017-06-03 PROCEDURE — 96374 THER/PROPH/DIAG INJ IV PUSH: CPT

## 2017-06-03 PROCEDURE — 81001 URINALYSIS AUTO W/SCOPE: CPT | Performed by: EMERGENCY MEDICINE

## 2017-06-03 PROCEDURE — 80053 COMPREHEN METABOLIC PANEL: CPT | Performed by: EMERGENCY MEDICINE

## 2017-06-03 PROCEDURE — 96375 TX/PRO/DX INJ NEW DRUG ADDON: CPT

## 2017-06-03 RX ORDER — ONDANSETRON 2 MG/ML
4 INJECTION INTRAMUSCULAR; INTRAVENOUS ONCE
Status: COMPLETED | OUTPATIENT
Start: 2017-06-03 | End: 2017-06-03

## 2017-06-03 RX ADMIN — IOPAMIDOL 100 ML: 612 INJECTION, SOLUTION INTRAVENOUS at 02:46

## 2017-06-03 RX ADMIN — SODIUM CHLORIDE 1000 ML: 9 INJECTION, SOLUTION INTRAVENOUS at 02:07

## 2017-06-03 RX ADMIN — HYDROMORPHONE HYDROCHLORIDE 1 MG: 1 INJECTION, SOLUTION INTRAMUSCULAR; INTRAVENOUS; SUBCUTANEOUS at 02:01

## 2017-06-03 RX ADMIN — ONDANSETRON 4 MG: 2 INJECTION INTRAMUSCULAR; INTRAVENOUS at 02:07

## 2017-06-03 NOTE — ED NOTES
Low abd pain/cramping radiating into back since this afternoon.  Diagnosed with kidney stone yesterday. Pt also c/o constipation.      Trinity Ram RN  06/02/17 [id number]

## 2017-06-03 NOTE — ED PROVIDER NOTES
" EMERGENCY DEPARTMENT ENCOUNTER    CHIEF COMPLAINT  Chief Complaint: abdominal pain  History given by: patient  History limited by: none  Room Number: 12/12  PMD: Rigoberto Solorzano MD      HPI:  Pt is a 62 y.o. male who presents complaining of lower abdominal pain, suspected secondary to a known ureteral stone. He was seen yesterday in this ED, dx with a right ureteral stone and discharged with medications for sx treatment. He has been PO pain meds without relief, and states that he feels his pain has changed in nature from yesterday's pain which was more localized to his back and flank. His last BM was yesterday morning, he is unable to note whether his abdomen is more distended than usual. Hx of appendectomy and other kidney stones.    Duration:  1.5 days  Onset: gradual  Timing: constant  Location: lower abdomen  Quality: \"pain\"  Intensity/Severity: severe  Progression: worsening  Associated Symptoms: none stated  Aggravating Factors: none stated  Alleviating Factors: none stated  Previous Episodes: hx of kidney stones  Treatment before arrival: seen here yesterday, dx with ureteral stone, PO pain meds PTA    PAST MEDICAL HISTORY  Active Ambulatory Problems     Diagnosis Date Noted   • Allergic rhinitis 02/24/2016   • Cervical spinal stenosis 02/24/2016   • ED (erectile dysfunction) of non-organic origin 02/24/2016   • BP (high blood pressure) 02/24/2016   • Lumbar radiculopathy 02/24/2016   • Neoplasm of skin 02/24/2016   • Obstructive apnea 02/24/2016   • Burning or prickling sensation 02/24/2016   • Neck pain 03/04/2016   • Benign non-nodular prostatic hyperplasia with lower urinary tract symptoms 03/04/2016   • Shoulder pain, acute 03/04/2016   • Urinary frequency 04/15/2016   • Hyperlipidemia 04/15/2016   • Chronic pain of right knee 02/01/2017   • Rotator cuff tendonitis 03/24/2017     Resolved Ambulatory Problems     Diagnosis Date Noted   • No Resolved Ambulatory Problems     Past Medical History:   Diagnosis " Date   • Allergic    • Arthritis    • ED (erectile dysfunction)    • Hypertension        PAST SURGICAL HISTORY  Past Surgical History:   Procedure Laterality Date   • ANAL FISTULOTOMY     • APPENDECTOMY     • COLONOSCOPY     • NECK SURGERY     • WISDOM TOOTH EXTRACTION         FAMILY HISTORY  Family History   Problem Relation Age of Onset   • Diabetes Mother    • Thyroid disease Mother    • Hypertension Mother    • Diabetes Father    • Heart disease Father    • Hypertension Father    • Atrial fibrillation Father    • Cancer Father    • Stroke Maternal Grandfather    • Stroke Paternal Grandfather        SOCIAL HISTORY  Social History     Social History   • Marital status:      Spouse name: N/A   • Number of children: N/A   • Years of education: N/A     Occupational History   • Not on file.     Social History Main Topics   • Smoking status: Never Smoker   • Smokeless tobacco: Never Used   • Alcohol use Yes      Comment: social   • Drug use: No   • Sexual activity: Defer     Other Topics Concern   • Not on file     Social History Narrative       ALLERGIES  Clarithromycin and Sulfa antibiotics    REVIEW OF SYSTEMS  Review of Systems   Constitutional: Negative for chills and fever.   HENT: Negative for congestion and sore throat.    Eyes: Negative.    Respiratory: Negative for cough and shortness of breath.    Cardiovascular: Negative for chest pain and leg swelling.   Gastrointestinal: Positive for abdominal pain (lower). Negative for diarrhea and vomiting.   Genitourinary: Negative for difficulty urinating and dysuria.   Musculoskeletal: Negative for back pain and neck pain.   Skin: Negative for rash and wound.   Allergic/Immunologic: Negative.    Neurological: Negative for dizziness, weakness, numbness and headaches.   Psychiatric/Behavioral: Negative.    All other systems reviewed and are negative.      PHYSICAL EXAM  ED Triage Vitals   Temp Heart Rate Resp BP SpO2   06/02/17 2235 06/02/17 2235 06/02/17 2235  06/02/17 2245 06/02/17 2235   96.2 °F (35.7 °C) 80 16 147/86 98 %      Temp src Heart Rate Source Patient Position BP Location FiO2 (%)   -- -- -- -- --              Physical Exam   Constitutional: He is oriented to person, place, and time and well-developed, well-nourished, and in no distress.   HENT:   Head: Normocephalic and atraumatic.   Eyes: EOM are normal. Pupils are equal, round, and reactive to light.   Neck: Normal range of motion. Neck supple.   Cardiovascular: Normal rate, regular rhythm and normal heart sounds.    Pulmonary/Chest: Effort normal and breath sounds normal. No respiratory distress.   Abdominal: Soft. There is tenderness (diffuse). There is no rebound and no guarding.   Musculoskeletal: Normal range of motion. He exhibits no edema.   Neurological: He is alert and oriented to person, place, and time. He has normal sensation and normal strength.   Skin: Skin is warm and dry.   Psychiatric: Mood and affect normal.   Nursing note and vitals reviewed.      LAB RESULTS  Lab Results (last 24 hours)     Procedure Component Value Units Date/Time    Urinalysis With / Culture If Indicated [[id number]]  (Abnormal) Collected:  06/03/17 0003    Specimen:  Urine from Urine, Clean Catch Updated:  06/03/17 0016     Color, UA Dark Yellow (A)     Appearance, UA Clear     pH, UA 5.5     Specific Gravity, UA 1.015     Glucose, UA Negative     Ketones, UA Negative     Bilirubin, UA Negative     Blood, UA Trace (A)     Protein, UA Negative     Leuk Esterase, UA Negative     Nitrite, UA Negative     Urobilinogen, UA 1.0 E.U./dL    Urinalysis, Microscopic Only [[id number]] Collected:  06/03/17 0003    Specimen:  Urine from Urine, Clean Catch Updated:  06/03/17 0017     RBC, UA 0-2 /HPF      WBC, UA 0-2 /HPF      Bacteria, UA None Seen /HPF      Squamous Epithelial Cells, UA 0-2 /HPF      Hyaline Casts, UA None Seen /LPF      Methodology Automated Microscopy    Comprehensive Metabolic Panel [[id number]]  (Abnormal)  Collected:  06/03/17 0030    Specimen:  Blood Updated:  06/03/17 0145     Glucose 145 (H) mg/dL      BUN 8 mg/dL      Creatinine 1.33 (H) mg/dL      Sodium 136 mmol/L      Potassium 3.6 mmol/L      Chloride 99 mmol/L      CO2 23.5 mmol/L      Calcium 8.6 mg/dL      Total Protein 7.0 g/dL      Albumin 3.70 g/dL      ALT (SGPT) 25 U/L      AST (SGOT) 13 U/L      Alkaline Phosphatase 81 U/L      Total Bilirubin 0.4 mg/dL      eGFR Non African Amer 55 (L) mL/min/1.73      Globulin 3.3 gm/dL      A/G Ratio 1.1 g/dL      BUN/Creatinine Ratio 6.0 (L)     Anion Gap 13.5 mmol/L     CBC & Differential [[id number]] Collected:  06/03/17 0031    Specimen:  Blood Updated:  06/03/17 0125    Narrative:       The following orders were created for panel order CBC & Differential.  Procedure                               Abnormality         Status                     ---------                               -----------         ------                     CBC Auto Differential[[id number]]        Abnormal            Final result                 Please view results for these tests on the individual orders.    CBC Auto Differential [[id number]]  (Abnormal) Collected:  06/03/17 0031    Specimen:  Blood Updated:  06/03/17 0125     WBC 10.60 10*3/mm3      RBC 5.27 10*6/mm3      Hemoglobin 16.0 g/dL      Hematocrit 46.8 %      MCV 88.8 fL      MCH 30.4 pg      MCHC 34.2 g/dL      RDW 15.0 (H) %      RDW-SD 47.6 fl      MPV 11.1 fL      Platelets 167 10*3/mm3      Neutrophil % 79.0 (H) %      Lymphocyte % 11.7 (L) %      Monocyte % 7.7 %      Eosinophil % 1.3 %      Basophil % 0.1 %      Immature Grans % 0.2 %      Neutrophils, Absolute 8.37 (H) 10*3/mm3      Lymphocytes, Absolute 1.24 10*3/mm3      Monocytes, Absolute 0.82 10*3/mm3      Eosinophils, Absolute 0.14 10*3/mm3      Basophils, Absolute 0.01 10*3/mm3      Immature Grans, Absolute 0.02 10*3/mm3           I ordered the above labs and reviewed the results    RADIOLOGY  CT Abdomen Pelvis With  Contrast   Preliminary Result   1.  A 0.4 cm stone has been passed from the right ureter into the   urinary bladder, residual mild right hydronephrosis remains.    2.  Bilateral renal cysts measuring up to 3.8 cm. Fatty infiltration of   the liver.              XR Abdomen KUB   Preliminary Result   No acute findings in the abdomen. Moderate distention of the colon with   gas.                     I ordered the above noted radiological studies. Interpreted by radiologist. Reviewed by me in PACS.       PROCEDURES  Procedures      PROGRESS AND CONSULTS  ED Course     1:22 AM: Will order labs, CT abd/pelvis for further analysis. Dilaudid and Zofran ordered for pain and nausea, IVF ordered for hydration.    3:34 AM: CT shows that the stone has passed into the bladder, patient will be discharged home. Pt understands and agrees with the plan. All questions answered.      MEDICAL DECISION MAKING  Results were reviewed/discussed with the patient and they were also made aware of online access. Pt also made aware that some labs, such as cultures, will not be resulted during ER visit and follow up with PMD is necessary.     MDM  Number of Diagnoses or Management Options  Kidney stone:      Amount and/or Complexity of Data Reviewed  Clinical lab tests: ordered and reviewed (BUN 8 Creatinine 1.33)  Tests in the radiology section of CPT®: ordered and reviewed (CT abd/pelvis shows that the ureteral stone has dropped into the urinary bladder)  Independent visualization of images, tracings, or specimens: yes    Patient Progress  Patient progress: stable         DIAGNOSIS  Final diagnoses:   Kidney stone       DISPOSITION  DISCHARGED    Patient discharged in stable condition.    Reviewed implications of results, diagnosis, meds, responsibility to follow up, warning signs and symptoms of possible worsening, potential complications and reasons to return to ER.    Patient/Family voiced understanding of above instructions.    Discussed  plan for discharge, as there is no emergent indication for admission.  Pt/family is agreeable and understands need for follow up and repeat testing.  Pt is aware that discharge does not mean that nothing is wrong but it indicates no emergency is present that requires admission and they must continue care with follow-up as given below or physician of their choice.     FOLLOW-UP  [facility]  [address] [facility] [id number] [phone]    As needed       Medication List      Notice     No changes were made to your prescriptions during this visit.      Latest Documented Vital Signs:  As of 3:33 AM  BP- 134/75 HR- 72 Temp- 96.2 °F (35.7 °C) O2 sat- 96%    --  Documentation assistance provided by radha Garcia for Dr. Miller.  Information recorded by the scribe was done at my direction and has been verified and validated by me.     Nik Garcia  06/03/17 0345       Lang Miller MD  06/03/17 1555

## 2017-06-06 ENCOUNTER — TELEPHONE (OUTPATIENT)
Dept: SOCIAL WORK | Facility: HOSPITAL | Age: 62
End: 2017-06-06

## 2017-06-06 RX ORDER — TAMSULOSIN HYDROCHLORIDE 0.4 MG/1
CAPSULE ORAL
Qty: 90 CAPSULE | Refills: 1 | Status: ON HOLD | OUTPATIENT
Start: 2017-06-06 | End: 2019-02-05

## 2017-06-16 ENCOUNTER — HOSPITAL ENCOUNTER (OUTPATIENT)
Dept: PHYSICAL THERAPY | Facility: HOSPITAL | Age: 62
Setting detail: THERAPIES SERIES
Discharge: HOME | End: 2017-06-16

## 2017-06-16 DIAGNOSIS — G89.29 CHRONIC PAIN OF BOTH KNEES: Primary | ICD-10-CM

## 2017-06-16 DIAGNOSIS — M17.11 PATELLOFEMORAL ARTHRITIS OF RIGHT KNEE: ICD-10-CM

## 2017-06-16 DIAGNOSIS — S46.012D STRAIN OF ROTATOR CUFF CAPSULE, LEFT, SUBSEQUENT ENCOUNTER: ICD-10-CM

## 2017-06-16 DIAGNOSIS — M25.512 CHRONIC LEFT SHOULDER PAIN: ICD-10-CM

## 2017-06-16 DIAGNOSIS — M25.561 CHRONIC PAIN OF BOTH KNEES: Primary | ICD-10-CM

## 2017-06-16 DIAGNOSIS — G89.29 CHRONIC LEFT SHOULDER PAIN: ICD-10-CM

## 2017-06-16 DIAGNOSIS — M25.562 CHRONIC PAIN OF BOTH KNEES: Primary | ICD-10-CM

## 2017-06-16 DIAGNOSIS — M75.52 SHOULDER BURSITIS, LEFT: ICD-10-CM

## 2017-06-16 PROCEDURE — 97110 THERAPEUTIC EXERCISES: CPT | Performed by: PHYSICAL THERAPIST

## 2017-06-16 PROCEDURE — 97035 APP MDLTY 1+ULTRASOUND EA 15: CPT | Performed by: PHYSICAL THERAPIST

## 2017-06-16 NOTE — THERAPY PROGRESS REPORT/RE-CERT
Outpatient Physical Therapy Ortho reassesment  [facility]     Patient Name: Jose Ramon Murcia  : 1955  MRN: [id number]  Today's Date: 2017      Visit Date: 2017    Visit Dx:    ICD-10-CM ICD-9-CM   1. Chronic pain of both knees M25.561 719.46    M25.562 338.29    G89.29    2. Chronic left shoulder pain M25.512 719.41    G89.29 338.29   3. Shoulder bursitis, left M75.52 726.10   4. Patellofemoral arthritis of right knee M19.90 716.96   5. Strain of rotator cuff capsule, left, subsequent encounter S46.012D V58.89     840.4       Patient Active Problem List   Diagnosis   • Allergic rhinitis   • Cervical spinal stenosis   • ED (erectile dysfunction) of non-organic origin   • BP (high blood pressure)   • Lumbar radiculopathy   • Neoplasm of skin   • Obstructive apnea   • Burning or prickling sensation   • Neck pain   • Benign non-nodular prostatic hyperplasia with lower urinary tract symptoms   • Shoulder pain, acute   • Urinary frequency   • Hyperlipidemia   • Chronic pain of right knee   • Rotator cuff tendonitis        Past Medical History:   Diagnosis Date   • Allergic    • Arthritis    • ED (erectile dysfunction)    • Hypertension         Past Surgical History:   Procedure Laterality Date   • ANAL FISTULOTOMY     • APPENDECTOMY     • COLONOSCOPY     • NECK SURGERY     • WISDOM TOOTH EXTRACTION                               PT Assessment/Plan       17 1703       PT Assessment    Functional Limitations Limitation in home management;Limitations in community activities;Performance in work activities;Performance in sport activities;Performance in self-care ADL;Performance in leisure activities;Limitations in functional capacity and performance  -CK     Impairments Range of motion;Impaired muscle endurance;Pain;Muscle strength;Posture;Joint mobility;Impaired flexibility  -CK     Assessment Comments Mr Murcia has been seen for 10 skilled therapy sessions since initiating treatment on  4/7/17 for B knee pain and L shoulder pain. His B knee pain has all but resolved and he can manage with HEP at this time. L shoulder pain was improving at last assessment but he had a setback over the weekend when lifting his suitcase while out of town. Since that time he reports increased pain in RTC referral pattern. Full can testing/empty can testing elicited sharp pain but he was able to hold. AROM flexion to 145 with painful arc from . Abduction WFL with minimal discomfort. AROM ER to 48 degrees. No imaging performed to shoulder at this time. Suggested call to ortho for possible xray/MRI. DASH score for UE dysfunction regressed to a 42.5 (where 0 = no deficits) with reassessment today. At this time, he requests focus in therapy to be on his L shoulder as he feels he can self manage knee pain. Updated HEP to include new RTC/scapular stabilization exercises. He remains appropriate from skilled therapy to address his current issues and allow for decreased pain with greatest amount of return function possible.  -CK     Please refer to paper survey for additional self-reported information Yes  -CK     Rehab Potential Good  -CK     Patient/caregiver participated in establishment of treatment plan and goals Yes  -CK     Patient would benefit from skilled therapy intervention Yes  -CK     PT Plan    PT Frequency 2x/week  -CK     Predicted Duration of Therapy Intervention (days/wks) 1 month  -CK     Planned CPT's? PT THER PROC EA 15 MIN: 06002;PT MANUAL THERAPY EA 15 MIN: 57186;PT HOT OR COLD PACK TREAT MCARE;PT ULTRASOUND EA 15 MIN: 59154  -CK     PT Plan Comments Assess use of phono last session. L RTC strengthening program. phonophoresis as needed.   -CK       User Key  (r) = Recorded By, (t) = Taken By, (c) = Cosigned By    Initials Name Provider Type    CK Mohan Valdivia, PT Physical Therapist                Modalities       06/16/17 1300          Ultrasound [id number]    Location L shoulder: RTC tendons  -CK       Rx Minutes 10  -CK      Duty Cycle 100  -CK      Frequency 1.0 MHz  -CK      Intensity - Wts/cm 1.5  -CK      Phonophoresis Yes  -CK        User Key  (r) = Recorded By, (t) = Taken By, (c) = Cosigned By    Initials Name Provider Type    CK Mohan DESIREE Valdivia, PT Physical Therapist                Exercises       06/16/17 1400 06/16/17 1300       Subjective Comments    Subjective Comments  Knees are doing well. I was [place] last Friday and went to lift my suitcase and have had increased pain in my L shoulder since. Havent been able to return to my exercises due to the pain.   -CK     Subjective Pain    Able to rate subjective pain?  yes  -CK     Pre-Treatment Pain Level  4  -CK     Post-Treatment Pain Level  4  -CK     Exercise 1    Exercise Name 1  reverse shoulder rolls  -CK     Weights/Plates 1  5  -CK     Reps 1  15  -CK     Exercise 2    Exercise Name 2  Sidelying ER  -CK     Cueing 2  Verbal  -CK     Equipment 2  Dumbell  -CK     Weights/Plates 2  2  -CK     Sets 2  2  -CK     Reps 2  10  -CK     Exercise 3    Exercise Name 3  shoulder ext  -CK     Cueing 3  Tactile  -CK     Equipment 3  Theraband  -CK     Resistance 3  Blue  -CK     Reps 3  15  -CK     Exercise 4    Exercise Name 4  Rows   high and low  -CK     Cueing 4  Tactile  -CK     Equipment 4  Theraband  -CK     Resistance 4  Blue  -CK     Reps 4  15   each  -CK     Exercise 5    Exercise Name 5  s/l shoulder abd to 90  -CK     Cueing 5  Demo  -CK     Equipment 5  Dumbell  -CK     Weights/Plates 5  2  -CK     Sets 5  2  -CK     Reps 5  10  -CK     Exercise 6    Exercise Name 6 --  -CK Doorway stretch- arms straight   arms low  -CK     Cueing 6 --  -CK Verbal  -CK     Reps 6 --  -CK 3  -CK     Time (Seconds) 6 --  -CK 30  -CK     Exercise 7    Exercise Name 7  Prone shoulder ext with scapular setting  -CK     Cueing 7  Demo  -CK     Equipment 7  Dumbell  -CK     Weights/Plates 7  2  -CK     Sets 7  2  -CK     Reps 7  10  -CK     Exercise 13    Exercise  "Name 13 UBE  -CK      Weights/Plates 13 4  -CK        User Key  (r) = Recorded By, (t) = Taken By, (c) = Cosigned By    Initials Name Provider Type    CK Mohan DESIREE Valdivia, PT Physical Therapist                               PT OP Goals       06/16/17 1700       PT Short Term Goals    STG 1 Patient will be independent and compliant with initial home exercise program  -CK     STG 1 Progress Met  -CK     STG 2 Patient will be independent with education for symptom management, joint protection and strategies to minimize stress on affected tissues  -CK     STG 2 Progress Met  -CK     Long Term Goals    LTG 1 Patient will be independent and compliant with advanced home exercise program to facilitate self-management of symptoms  -CK     LTG 1 Progress Progressing  -CK     LTG 1 Progress Comments updated today   -CK     LTG 2 Patient will increase shoulder flexion L AROM flexion to 0-160 deg, without painful arc, to increase ease of reaching overhead into cabinets, putting on shirt, etc.  -CK     LTG 2 Progress Ongoing  -CK     LTG 2 Progress Comments painful arc . AROM to 145 today  -CK     LTG 3 Patient will be able to reach with L shoulder to L2 for improved IR with donning/doffing belt.  -CK     LTG 3 Progress Ongoing  -CK     LTG 4 Pt. will report L shoulder pain </= 1-2/10 with all daily activities and sleeping.  -CK     LTG 4 Progress Ongoing  -CK     LTG 4 Progress Comments 4/10 today. increased since last week's \"incident\"  -CK     LTG 5 Patient will report 50% improvement with stair negotiation and report no episodes of \"giving way.\"  -CK     LTG 5 Progress Met  -CK     LTG 6 Patient will score >/= 60/80 on Knee Outcome Survery, indicating improved perceived functional ability with daily activities  -CK     LTG 6 Progress Partially Met  -CK     LTG 7 Patient will report ability to sit >/= 30 min without shifting in chair/changing knee positions in order for him to better perform his job as a [employer] .  "  -CK     LTG 7 Progress Partially Met  -CK       User Key  (r) = Recorded By, (t) = Taken By, (c) = Cosigned By    Initials Name Provider Type    CK Mohan Valdivia PT Physical Therapist                    Outcome Measures       06/16/17 1600          DASH    Open a tight or new jar. 2  -CK      Write 2  -CK      Turn a key 2  -CK      Prepare a meal 2  -CK      Push open a heavy door 3  -CK      Place an object on a shelf above your head 3  -CK      Do heavy household chores (e.g., wash walls, wash floors) 3  -CK      Garden or do yard work 3  -CK      Make a bed 3  -CK      Carry a shopping bag or briefcase 3  -CK      Carry a heavy object (over 10 lbs) 3  -CK      Change a lightbulb overhead 3  -CK      Wash or blow dry your hair 3  -CK      Wash your back 4  -CK      Put on a pullover sweater 3  -CK      Use a knife to cut food 3  -CK      Recreational activities in which require little effort (e.g., cardplaying, knitting, etc.) 1  -CK      Recreational activities in which you take some force or impact through your arm, should or hand (e.g. golf, hammering, tennis, etc.) 3  -CK      Recreational Activities in which you move your arm freely (e.g., frisbee, badminton, etc.) 3  -CK      Manage transportation needs (getting from one place to another) 2  -CK      Sexual Activities 2  -CK      During the past week, to what extent has your arm, shoulder, or hand problem interfered with your normal social activites with family, friends, neighbors or groups? 3  -CK      During the past week, were you limited in your work or other regular daily activities as a result of your arm, shoulder or hand problem? 3  -CK      Arm, Shoulder, or hand pain 3  -CK      Arm, shoulder or hand pain when you performed any specific activity 3  -CK      Tingling (pins and needles) in your arm, shoulder, or hand 2  -CK      Weakness in your arm, shoulder or hand 2  -CK      Stiffness in your arm, shoulder or hand 2  -CK      During the past  week, how much difficulty have you had sleeping because of the pain in your arm, shoulder or hand? 3  -CK      I feel less capable, less confident or less useful because of my arm, shoulder or hand problem 4  -CK      DASH Sum  81  -CK      Number of Questions Answered 30  -CK      DASH Score 42.5  -CK      Functional Assessment    Outcome Measure Options Disabilities of the Arm, Shoulder, and Hand (DASH)  -CK        User Key  (r) = Recorded By, (t) = Taken By, (c) = Cosigned By    Initials Name Provider Type    CK Mohan Valdivia, PT Physical Therapist            Time Calculation:   Start Time: 1345  Stop Time: 1430  Time Calculation (min): 45 min    Therapy Charges for Today     Code Description Service Date Service Provider Modifiers Qty    [id number]  PT ULTRASOUND EA 15 MIN 6/16/2017 Mohan Valdivia, PT GP 1    [id number]  PT THER PROC EA 15 MIN 6/16/2017 Mohan Valdivia, PT GP 2          PT G-Codes  Outcome Measure Options: Disabilities of the Arm, Shoulder, and Hand (DASH)  Score: 42.5         Mohan Valdivia PT  6/16/2017

## 2017-06-23 ENCOUNTER — HOSPITAL ENCOUNTER (OUTPATIENT)
Dept: PHYSICAL THERAPY | Facility: HOSPITAL | Age: 62
Setting detail: THERAPIES SERIES
Discharge: HOME | End: 2017-06-23

## 2017-06-23 DIAGNOSIS — M17.11 PATELLOFEMORAL ARTHRITIS OF RIGHT KNEE: ICD-10-CM

## 2017-06-23 DIAGNOSIS — G89.29 CHRONIC PAIN OF BOTH KNEES: Primary | ICD-10-CM

## 2017-06-23 DIAGNOSIS — S46.012D STRAIN OF ROTATOR CUFF CAPSULE, LEFT, SUBSEQUENT ENCOUNTER: ICD-10-CM

## 2017-06-23 DIAGNOSIS — G89.29 CHRONIC LEFT SHOULDER PAIN: ICD-10-CM

## 2017-06-23 DIAGNOSIS — M25.561 CHRONIC PAIN OF BOTH KNEES: Primary | ICD-10-CM

## 2017-06-23 DIAGNOSIS — M25.512 CHRONIC LEFT SHOULDER PAIN: ICD-10-CM

## 2017-06-23 DIAGNOSIS — M75.52 SHOULDER BURSITIS, LEFT: ICD-10-CM

## 2017-06-23 DIAGNOSIS — M25.562 CHRONIC PAIN OF BOTH KNEES: Primary | ICD-10-CM

## 2017-06-23 PROCEDURE — 97035 APP MDLTY 1+ULTRASOUND EA 15: CPT | Performed by: PHYSICAL THERAPIST

## 2017-06-23 PROCEDURE — 97110 THERAPEUTIC EXERCISES: CPT | Performed by: PHYSICAL THERAPIST

## 2017-06-23 PROCEDURE — 97140 MANUAL THERAPY 1/> REGIONS: CPT | Performed by: PHYSICAL THERAPIST

## 2017-06-23 NOTE — THERAPY TREATMENT NOTE
Outpatient Physical Therapy Ortho Treatment Note  [facility]     Patient Name: Jose Ramon Murcia  : 1955  MRN: [id number]  Today's Date: 2017      Visit Date: 2017    Visit Dx:    ICD-10-CM ICD-9-CM   1. Chronic pain of both knees M25.561 719.46    M25.562 338.29    G89.29    2. Chronic left shoulder pain M25.512 719.41    G89.29 338.29   3. Shoulder bursitis, left M75.52 726.10   4. Patellofemoral arthritis of right knee M19.90 716.96   5. Strain of rotator cuff capsule, left, subsequent encounter S46.012D V58.89     840.4       Patient Active Problem List   Diagnosis   • Allergic rhinitis   • Cervical spinal stenosis   • ED (erectile dysfunction) of non-organic origin   • BP (high blood pressure)   • Lumbar radiculopathy   • Neoplasm of skin   • Obstructive apnea   • Burning or prickling sensation   • Neck pain   • Benign non-nodular prostatic hyperplasia with lower urinary tract symptoms   • Shoulder pain, acute   • Urinary frequency   • Hyperlipidemia   • Chronic pain of right knee   • Rotator cuff tendonitis        Past Medical History:   Diagnosis Date   • Allergic    • Arthritis    • ED (erectile dysfunction)    • Hypertension         Past Surgical History:   Procedure Laterality Date   • ANAL FISTULOTOMY     • APPENDECTOMY     • COLONOSCOPY     • NECK SURGERY     • WISDOM TOOTH EXTRACTION                               PT Assessment/Plan       17 1247       PT Assessment    Assessment Comments Improved pain for 24 hours post phonophoresis last session. Continued with strengthening program, adding resisted ER, IR, and clock pattern. Palpable tension/trigger point noted in L latissimus and teres musculature. Referrals given to ortho with shoulder speciality to possibly initiate relationship with if no improvement in shoulder in coming weeks.   -CK     PT Plan    PT Plan Comments Strengthening program, phonophoresis PRN.   -CK       User Key  (r) = Recorded By, (t) = Taken By,  (c) = Cosigned By    Initials Name Provider Type    CK Mohan RAMÍREZ Skip, PT Physical Therapist                Modalities       06/23/17 1100          Ultrasound 54650    Location L shoulder: RTC tendons  -CK      Rx Minutes 10  -CK      Duty Cycle 100  -CK      Frequency 1.0 MHz  -CK      Intensity - Wts/cm 1.5  -CK      Phonophoresis Yes  -CK        User Key  (r) = Recorded By, (t) = Taken By, (c) = Cosigned By    Initials Name Provider Type    CK Mohan Suhopf, PT Physical Therapist                Exercises       06/23/17 1100          Subjective Comments    Subjective Comments I felt great for almost 24 hours after the ultrasound. Then It hurt again when the medication wore off.   -CK      Subjective Pain    Able to rate subjective pain? yes  -CK      Pre-Treatment Pain Level 4  -CK      Post-Treatment Pain Level 4  -CK      Exercise 1    Exercise Name 1 reverse shoulder rolls  -CK      Weights/Plates 1 5  -CK      Sets 1 2  -CK      Reps 1 10  -CK      Exercise 2    Exercise Name 2 Sidelying ER  -CK      Cueing 2 Verbal  -CK      Equipment 2 Dumbell  -CK      Weights/Plates 2 2  -CK      Sets 2 2  -CK      Reps 2 10  -CK      Exercise 4    Exercise Name 4 supine clock-over towel roll  -CK      Cueing 4 Demo;Verbal  -CK      Equipment 4 Theraband  -CK      Resistance 4 Green  -CK      Reps 4 10  -CK      Exercise 5    Exercise Name 5 s/l shoulder abd to 90  -CK      Cueing 5 Demo  -CK      Equipment 5 Dumbell  -CK      Weights/Plates 5 2  -CK      Sets 5 2  -CK      Reps 5 10  -CK      Exercise 6    Exercise Name 6 Doorway stretch- arms straight   arms low  -CK      Cueing 6 Verbal  -CK      Reps 6 3  -CK      Time (Seconds) 6 30  -CK      Exercise 7    Exercise Name 7 Prone shoulder ext with scapular setting  -CK      Cueing 7 Demo  -CK      Equipment 7 Dumbell  -CK      Weights/Plates 7 2  -CK      Reps 7 20  -CK      Exercise 8    Exercise Name 8  scapular punches   while on blue noodle  -CK      Cueing 8 Demo   -CK      Equipment 8 Dumbell  -CK      Weights/Plates 8 5  -CK      Reps 8 15  -CK      Exercise 9    Exercise Name 9 blue noodle: scapular squeezes  -CK      Cueing 9 Verbal  -CK      Reps 9 15  -CK      Time (Seconds) 9 3  -CK      Exercise 10    Exercise Name 10 Isolated IR  -CK      Cueing 10 Demo  -CK      Equipment 10 Theraband  -CK      Resistance 10 Green  -CK      Reps 10 10  -CK      Exercise 11    Exercise Name 11 Isolated ER  -CK      Cueing 11 Verbal  -CK      Equipment 11 Theraband  -CK      Resistance 11 Green  -CK      Reps 11 10  -CK      Exercise 12    Exercise Name 12 Supine ER stretch-beach chair  -CK      Reps 12 4  -CK      Time (Seconds) 12 10  -CK      Exercise 13    Exercise Name 13 UBE  -CK      Weights/Plates 13 4  -CK        User Key  (r) = Recorded By, (t) = Taken By, (c) = Cosigned By    Initials Name Provider Type    CK Mohan Valdivia PT Physical Therapist                        Manual Rx (last 36 hours)      Manual Treatments       06/23/17 1100          Manual Rx 1    Manual Rx 1 Location L shoulder, latissimus, and teres musculature STM/MFR  -CK      Manual Rx 1 Type Supine  -CK      Manual Rx 1 Duration 5 min  -CK      Manual Rx 2    Manual Rx 2 Location L shoulder scapular STM and joint mobs  -CK      Manual Rx 2 Grade Grade II-III  -CK      Manual Rx 2 Duration 5 min  -CK      Manual Rx 3    Manual Rx 3 Location L GH AP, inferior joint mobs & oscillations  -CK      Manual Rx 3 Grade Grade II-III  -CK      Manual Rx 3 Duration 5 min  -CK        User Key  (r) = Recorded By, (t) = Taken By, (c) = Cosigned By    Initials Name Provider Type    CK Mohan Valdivia PT Physical Therapist                        Time Calculation:   Start Time: 1100  Stop Time: 1200  Time Calculation (min): 60 min    Therapy Charges for Today     Code Description Service Date Service Provider Modifiers Qty    28979165098 HC PT MANUAL THERAPY EA 15 MIN 6/23/2017 Mohan Valdivia PT GP 1    19650905678 HC PT THER  PROC EA 15 MIN 6/23/2017 Mohan Valdivia, PT GP 2    25198130547 HC PT ULTRASOUND EA 15 MIN 6/23/2017 Mohan Valdivia, PT GP 1                    Mohan Valdivia, PT  6/23/2017

## 2017-06-30 ENCOUNTER — HOSPITAL ENCOUNTER (OUTPATIENT)
Dept: PHYSICAL THERAPY | Facility: HOSPITAL | Age: 62
Setting detail: THERAPIES SERIES
Discharge: HOME | End: 2017-06-30

## 2017-06-30 DIAGNOSIS — M75.52 SHOULDER BURSITIS, LEFT: ICD-10-CM

## 2017-06-30 DIAGNOSIS — M17.11 PATELLOFEMORAL ARTHRITIS OF RIGHT KNEE: ICD-10-CM

## 2017-06-30 DIAGNOSIS — M25.512 CHRONIC LEFT SHOULDER PAIN: ICD-10-CM

## 2017-06-30 DIAGNOSIS — M25.562 CHRONIC PAIN OF BOTH KNEES: Primary | ICD-10-CM

## 2017-06-30 DIAGNOSIS — G89.29 CHRONIC LEFT SHOULDER PAIN: ICD-10-CM

## 2017-06-30 DIAGNOSIS — M25.561 CHRONIC PAIN OF BOTH KNEES: Primary | ICD-10-CM

## 2017-06-30 DIAGNOSIS — S46.012D STRAIN OF ROTATOR CUFF CAPSULE, LEFT, SUBSEQUENT ENCOUNTER: ICD-10-CM

## 2017-06-30 DIAGNOSIS — G89.29 CHRONIC PAIN OF BOTH KNEES: Primary | ICD-10-CM

## 2017-06-30 PROCEDURE — 97140 MANUAL THERAPY 1/> REGIONS: CPT

## 2017-06-30 PROCEDURE — 97110 THERAPEUTIC EXERCISES: CPT

## 2017-06-30 PROCEDURE — 97035 APP MDLTY 1+ULTRASOUND EA 15: CPT

## 2017-07-07 ENCOUNTER — HOSPITAL ENCOUNTER (OUTPATIENT)
Dept: PHYSICAL THERAPY | Facility: HOSPITAL | Age: 62
Setting detail: THERAPIES SERIES
Discharge: HOME | End: 2017-07-07

## 2017-07-07 DIAGNOSIS — M25.512 CHRONIC LEFT SHOULDER PAIN: ICD-10-CM

## 2017-07-07 DIAGNOSIS — S46.012D STRAIN OF ROTATOR CUFF CAPSULE, LEFT, SUBSEQUENT ENCOUNTER: ICD-10-CM

## 2017-07-07 DIAGNOSIS — M75.52 SHOULDER BURSITIS, LEFT: ICD-10-CM

## 2017-07-07 DIAGNOSIS — G89.29 CHRONIC PAIN OF BOTH KNEES: Primary | ICD-10-CM

## 2017-07-07 DIAGNOSIS — M17.11 PATELLOFEMORAL ARTHRITIS OF RIGHT KNEE: ICD-10-CM

## 2017-07-07 DIAGNOSIS — M25.562 CHRONIC PAIN OF BOTH KNEES: Primary | ICD-10-CM

## 2017-07-07 DIAGNOSIS — M25.561 CHRONIC PAIN OF BOTH KNEES: Primary | ICD-10-CM

## 2017-07-07 DIAGNOSIS — G89.29 CHRONIC LEFT SHOULDER PAIN: ICD-10-CM

## 2017-07-07 PROCEDURE — 97035 APP MDLTY 1+ULTRASOUND EA 15: CPT | Performed by: PHYSICAL THERAPIST

## 2017-07-07 PROCEDURE — 97110 THERAPEUTIC EXERCISES: CPT | Performed by: PHYSICAL THERAPIST

## 2017-07-07 NOTE — THERAPY TREATMENT NOTE
"    Outpatient Physical Therapy Ortho Treatment Note  [facility]     Patient Name: Jose Ramon Murcia  : 1955  MRN: [id number]  Today's Date: 2017      Visit Date: 2017    Visit Dx:    ICD-10-CM ICD-9-CM   1. Chronic pain of both knees M25.561 719.46    M25.562 338.29    G89.29    2. Chronic left shoulder pain M25.512 719.41    G89.29 338.29   3. Shoulder bursitis, left M75.52 726.10   4. Patellofemoral arthritis of right knee M19.90 716.96   5. Strain of rotator cuff capsule, left, subsequent encounter S46.012D V58.89     840.4       Patient Active Problem List   Diagnosis   • Allergic rhinitis   • Cervical spinal stenosis   • ED (erectile dysfunction) of non-organic origin   • BP (high blood pressure)   • Lumbar radiculopathy   • Neoplasm of skin   • Obstructive apnea   • Burning or prickling sensation   • Neck pain   • Benign non-nodular prostatic hyperplasia with lower urinary tract symptoms   • Shoulder pain, acute   • Urinary frequency   • Hyperlipidemia   • Chronic pain of right knee   • Rotator cuff tendonitis        Past Medical History:   Diagnosis Date   • Allergic    • Arthritis    • ED (erectile dysfunction)    • Hypertension         Past Surgical History:   Procedure Laterality Date   • ANAL FISTULOTOMY     • APPENDECTOMY     • COLONOSCOPY     • NECK SURGERY     • WISDOM TOOTH EXTRACTION                               PT Assessment/Plan       17 1054       PT Assessment    Assessment Comments Mr. Doll continues to report intermittent sharp pain with \"certain movements.\" IR, ER, and pure abduction are primary movements that exacerbate pain. I suspect he has a small RTC tear in the L shoulder. He reports no plans to consider surgery at this time even if indicated. Phonophoresis with hydrocortisone cream gives him relief for 2-3 days. Appt made for specialist with Dr. Connolly to assess shoulder and consider cortisone injection until further needs arise. Plan to move " towards self management of strengthening program in next 2-3 sessions.   -CK     PT Plan    PT Plan Comments Assess response to MD appt.   -CK       User Key  (r) = Recorded By, (t) = Taken By, (c) = Cosigned By    Initials Name Provider Type    CK Mohan DESIREE Valdivia, PT Physical Therapist                Modalities       07/07/17 0900          Ultrasound [id number]    Location L shoulder: RTC tendons  -CK      Rx Minutes 10  -CK      Duty Cycle 100  -CK      Frequency 1.0 MHz  -CK      Intensity - Wts/cm 1.5  -CK      Phonophoresis Yes  -CK        User Key  (r) = Recorded By, (t) = Taken By, (c) = Cosigned By    Initials Name Provider Type    CK Mohan Valdivia, PT Physical Therapist                Exercises       07/07/17 0900          Subjective Comments    Subjective Comments Phonophoresis helps for 2 or so days then the pain returns. I know I cannot reach behind my back to thread my belt.   -CK      Subjective Pain    Able to rate subjective pain? yes  -CK      Pre-Treatment Pain Level 0  -CK      Post-Treatment Pain Level 0  -CK      Exercise 1    Exercise Name 1 reverse shoulder rolls  -CK      Cueing 1 Verbal;Demo  -CK      Weights/Plates 1 5  -CK      Sets 1 2  -CK      Reps 1 10  -CK      Exercise 2    Exercise Name 2 Sidelying ER  -CK      Cueing 2 Verbal  -CK      Equipment 2 Dumbell  -CK      Weights/Plates 2 4  -CK      Sets 2 2  -CK      Reps 2 10  -CK      Exercise 3    Exercise Name 3 shoulder ext  -CK      Cueing 3 Tactile  -CK      Weights/Plates 3 3 Plates  -CK      Reps 3 15  -CK      Exercise 4    Exercise Name 4 supine clock-over blue roll   -CK      Cueing 4 Demo;Verbal  -CK      Equipment 4 Theraband  -CK      Resistance 4 Green  -CK      Reps 4 10  -CK      Exercise 5    Exercise Name 5 s/l shoulder abd to 90  -CK      Cueing 5 Demo  -CK      Equipment 5 Dumbell  -CK      Weights/Plates 5 4  -CK      Sets 5 2  -CK      Reps 5 10  -CK      Exercise 6    Exercise Name 6 Doorway stretch- arms straight    arms low  -CK      Cueing 6 Verbal  -CK      Reps 6 3  -CK      Time (Seconds) 6 30  -CK      Exercise 8    Exercise Name 8  scapular punches   while on blue noodle  -CK      Cueing 8 Demo  -CK      Equipment 8 Dumbell  -CK      Weights/Plates 8 5  -CK      Reps 8 15  -CK      Exercise 9    Exercise Name 9 blue noodle: scapular squeezes  -CK      Cueing 9 Verbal  -CK      Reps 9 15  -CK      Time (Seconds) 9 3  -CK      Exercise 10    Exercise Name 10 Isolated IR  -CK      Cueing 10 Demo  -CK      Equipment 10 Theraband  -CK      Resistance 10 Blue  -CK      Reps 10 15  -CK      Exercise 13    Exercise Name 13 UBE  -CK      Weights/Plates 13 3  -CK      Time (Minutes) 13 5  -CK      Exercise 14    Exercise Name 14 Lat pulls- Tuff stuff machine  -CK      Cueing 14 Demo  -CK      Weights/Plates 14 3 Plates  -CK      Reps 14 20  -CK      Exercise 15    Exercise Name 15 Rows  -CK      Cueing 15 Demo  -CK      Weights/Plates 15 4 Plates  -CK      Reps 15 20  -CK        User Key  (r) = Recorded By, (t) = Taken By, (c) = Cosigned By    Initials Name Provider Type    CK Mohan S Skip, PT Physical Therapist                               PT OP Goals       07/07/17 1000       PT Short Term Goals    STG 1 Patient will be independent and compliant with initial home exercise program  -CK     STG 1 Progress Met  -CK     STG 2 Patient will be independent with education for symptom management, joint protection and strategies to minimize stress on affected tissues  -CK     STG 2 Progress Met  -CK     Long Term Goals    LTG 1 Patient will be independent and compliant with advanced home exercise program to facilitate self-management of symptoms  -CK     LTG 1 Progress Progressing  -CK     LTG 1 Progress Comments reports compliance with shoulder strengthening program  -CK     LTG 2 Patient will increase shoulder flexion L AROM flexion to 0-160 deg, without painful arc, to increase ease of reaching overhead into cabinets, putting on  "shirt, etc.  -CK     LTG 2 Progress Ongoing  -CK     LTG 2 Progress Comments Painful arc remains  approximately. AROM 150 today  -CK     LTG 3 Patient will be able to reach with L shoulder to L2 for improved IR with donning/doffing belt.  -CK     LTG 3 Progress Ongoing  -CK     LTG 3 Progress Comments greatest amount of pain reported with performing this activity  -CK     LTG 4 Pt. will report L shoulder pain </= 1-2/10 with all daily activities and sleeping.  -CK     LTG 4 Progress Ongoing  -CK     LTG 4 Progress Comments 0/10 at start of session today. 4/10 with \"certain movement\"  -CK     LTG 5 Patient will report 50% improvement with stair negotiation and report no episodes of \"giving way.\"  -CK     LTG 5 Progress Met  -CK     LTG 6 Patient will score >/= 60/80 on Knee Outcome Survery, indicating improved perceived functional ability with daily activities  -CK     LTG 6 Progress Partially Met  -CK     LTG 7 Patient will report ability to sit >/= 30 min without shifting in chair/changing knee positions in order for him to better perform his job as a [employer] .   -CK     LTG 7 Progress Partially Met  -CK       User Key  (r) = Recorded By, (t) = Taken By, (c) = Cosigned By    Initials Name Provider Type    KEAGAN Valdivia PT Physical Therapist                    Time Calculation:   Start Time: 0915  Stop Time: 1005  Time Calculation (min): 50 min    Therapy Charges for Today     Code Description Service Date Service Provider Modifiers Qty    29965247629 HC PT ULTRASOUND EA 15 MIN 7/7/2017 Mohan Valdivia, PT GP 1    61019981375 HC PT THER PROC EA 15 MIN 7/7/2017 Mohan Valdivia PT GP 2                    Mohan Valdivia, PT  7/7/2017     "

## 2017-07-14 ENCOUNTER — APPOINTMENT (OUTPATIENT)
Dept: PHYSICAL THERAPY | Facility: HOSPITAL | Age: 62
End: 2017-07-14

## 2017-07-19 ENCOUNTER — OFFICE VISIT (OUTPATIENT)
Dept: ORTHOPEDIC SURGERY | Facility: CLINIC | Age: 62
End: 2017-07-19

## 2017-07-19 VITALS — WEIGHT: 288 LBS | HEIGHT: 72 IN | BODY MASS INDEX: 39.01 KG/M2 | TEMPERATURE: 98.2 F

## 2017-07-19 DIAGNOSIS — M25.512 CHRONIC LEFT SHOULDER PAIN: Primary | ICD-10-CM

## 2017-07-19 DIAGNOSIS — G89.29 CHRONIC LEFT SHOULDER PAIN: Primary | ICD-10-CM

## 2017-07-19 DIAGNOSIS — M19.012 PRIMARY OSTEOARTHRITIS OF LEFT SHOULDER: ICD-10-CM

## 2017-07-19 PROCEDURE — 99203 OFFICE O/P NEW LOW 30 MIN: CPT | Performed by: ORTHOPAEDIC SURGERY

## 2017-07-19 PROCEDURE — 20610 DRAIN/INJ JOINT/BURSA W/O US: CPT | Performed by: ORTHOPAEDIC SURGERY

## 2017-07-19 PROCEDURE — 73030 X-RAY EXAM OF SHOULDER: CPT | Performed by: ORTHOPAEDIC SURGERY

## 2017-07-19 RX ORDER — METHYLPREDNISOLONE ACETATE 80 MG/ML
160 INJECTION, SUSPENSION INTRA-ARTICULAR; INTRALESIONAL; INTRAMUSCULAR; SOFT TISSUE
Status: COMPLETED | OUTPATIENT
Start: 2017-07-19 | End: 2017-07-19

## 2017-07-19 RX ORDER — BUPIVACAINE HYDROCHLORIDE 5 MG/ML
2 INJECTION, SOLUTION PERINEURAL
Status: COMPLETED | OUTPATIENT
Start: 2017-07-19 | End: 2017-07-19

## 2017-07-19 RX ORDER — LIDOCAINE HYDROCHLORIDE 10 MG/ML
2 INJECTION, SOLUTION INFILTRATION; PERINEURAL
Status: COMPLETED | OUTPATIENT
Start: 2017-07-19 | End: 2017-07-19

## 2017-07-19 RX ADMIN — LIDOCAINE HYDROCHLORIDE 2 ML: 10 INJECTION, SOLUTION INFILTRATION; PERINEURAL at 10:02

## 2017-07-19 RX ADMIN — METHYLPREDNISOLONE ACETATE 160 MG: 80 INJECTION, SUSPENSION INTRA-ARTICULAR; INTRALESIONAL; INTRAMUSCULAR; SOFT TISSUE at 10:02

## 2017-07-19 RX ADMIN — BUPIVACAINE HYDROCHLORIDE 2 ML: 5 INJECTION, SOLUTION PERINEURAL at 10:02

## 2017-07-19 NOTE — PROGRESS NOTES
Patient: Jose Ramon Murcia    YOB: 1955    Medical Record Number: [id number]    Chief Complaints:  Left shoulder pain    History of Present Illness:     62 y.o. male patient who presents with a long history of progressively worsening pain and dysfunction affecting the left shoulder.  He tells me that he has had off and on symptoms for several years but things seem to work on especially worse over the past few months.  He cannot recall any specific inciting event or factor.  He describes his pain as moderate, intermittent, and aching.  Denies having noticed any associated symptoms.  The pain is worse with driving.  The pain is somewhat better with ice.  He localizes the pain mostly to the back of the shoulder, occasionally in the armpit or around to the front.    Allergies:   Allergies   Allergen Reactions   • Clarithromycin    • Sulfa Antibiotics        Home Medications:    Current Outpatient Prescriptions:   •  amphetamine-dextroamphetamine (ADDERALL) 20 MG tablet, Take 20 mg by mouth Daily. Take 2 tablets by mouth every morning and 1 tablet by mouth at noon, Disp: , Rfl:   •  azelastine (ASTELIN) 0.1 % nasal spray, 2 sprays into each nostril 2 (Two) Times a Day. Use in each nostril as directed, Disp: , Rfl:   •  fluticasone (FLONASE) 50 MCG/ACT nasal spray, into each nostril daily., Disp: , Rfl:   •  NEXIUM 40 MG capsule, TK ONE C PO  QD, Disp: , Rfl: 5  •  tamsulosin (FLOMAX) 0.4 MG capsule 24 hr capsule, TAKE 1 CAPSULE BY MOUTH EVERY DAY, Disp: 90 capsule, Rfl: 1    Past Medical History:   Diagnosis Date   • Allergic    • Arthritis    • ED (erectile dysfunction)    • Hypertension        Past Surgical History:   Procedure Laterality Date   • ANAL FISTULOTOMY     • APPENDECTOMY     • COLONOSCOPY     • NECK SURGERY     • WISDOM TOOTH EXTRACTION         Social History     Occupational History   • Not on file.     Social History Main Topics   • Smoking status: Never Smoker   • Smokeless tobacco:  "Never Used   • Alcohol use Yes      Comment: social   • Drug use: No   • Sexual activity: Defer      Social History     Social History Narrative       Family History   Problem Relation Age of Onset   • Diabetes Mother    • Thyroid disease Mother    • Hypertension Mother    • Diabetes Father    • Heart disease Father    • Hypertension Father    • Atrial fibrillation Father    • Cancer Father    • Stroke Maternal Grandfather    • Stroke Paternal Grandfather        Review of Systems:      Constitutional: Denies fever, shaking or chills   Eyes: Denies change in visual acuity   HEENT: Denies nasal congestion or sore throat   Respiratory: Denies cough or shortness of breath   Cardiovascular: Denies chest pain or edema  Endocrine: Denies tremors, palpitations, intolerance of heat or cold, polyuria, polydipsia.  GI: Denies abdominal pain, nausea, vomiting, bloody stools or diarrhea  : Denies frequency, urgency, incontinence, retention, or nocturia.  Musculoskeletal: Denies numbness tingling or loss of motor function except as above  Integument: Denies rash, lesion or ulceration   Neurologic: Denies headache or focal weakness, deficits  Heme: Denies epistaxis, spontaneous or excessive bleeding, epistaxis, hematuria, melena, fatigue, enlarged or tender lymph nodes.      All other pertinent positives and negatives as noted above in HPI.    Physical Exam: 62 y.o. male  Vitals:    07/19/17 0951   Temp: 98.2 °F (36.8 °C)   TempSrc: Temporal Artery    Weight: 288 lb (131 kg)   Height: 72\" (182.9 cm)     General:  Patient is awake and alert.  Appears in no acute distress or discomfort.    Psych:  Affect and demeanor are appropriate.    Eyes:  Conjunctiva and sclera appear grossly normal.  Eyes track well and EOM seem to be intact.    Ears:  No gross abnormalities.  Hearing adequate for the exam.    Cardiovascular:  Regular rate and rhythm.    Lungs:  Good chest expansion.  Breathing unlabored.    Spine:  Neck appears grossly " normal.  No palpable masses or adenopathy.  Good motion.  Spurling's maneuver is negative for any shoulder or arm symptoms.    Extremities:  Skin is benign.  No obvious gross abnormalities about left shoulder.  No palpable masses or adenopathy.  Moderate tenderness noted over anterior glenohumeral joint and rotator interval.  Motion is to 165 degrees of forward elevation, 35 degrees of external rotation, minus 5 degrees of horizontal abduction, back pocket internal rotation.  No instability.  Rotator cuff strength seems to be well preserved but the exam is limited due to patient discomfort with resisted active motion.  Good motor and sensory function in lower arm and hand.  Palpable pulses.         Radiology:  AP, scapular Y, and axillary views of the left shoulder are ordered by myself and reviewed to evaluate the patient's complaint.  No comparison films are immediately available.  The x-rays show moderate glenohumeral osteoarthritis with joint space narrowing, osteophyte formation, and subchondral sclerosis.  The acromiohumeral interval measures normal.    Assessment/Plan:  Left shoulder osteoarthritis    We discussed treatment options in detail including the risks, benefits, and alternatives of conservative treatment versus surgical options.  Regarding conservative treatment, we discussed appropriate activity modifications, anti-inflammatories, injections, and physical therapy.  We also discussed the option of an arthroplasty and all that would entail.  I have recommended that we start with a conservative approach and the patient agrees.    The patient has acknowledged understanding of the information and elected for an injection.  The risks, benefits and alternatives were discussed and he consented.  Going forward, he will follow up as needed.    Large Joint Arthrocentesis  Date/Time: 7/19/2017 10:02 AM  Consent given by: patient  Site marked: site marked  Timeout: Immediately prior to procedure a time out was  called to verify the correct patient, procedure, equipment, support staff and site/side marked as required   Supporting Documentation  Indications: pain   Procedure Details  Location: shoulder - L glenohumeral  Preparation: Patient was prepped and draped in the usual sterile fashion  Needle size: 25 G  Approach: anterior  Medications administered: 2 mL bupivacaine; 2 mL lidocaine 1 %; 160 mg methylPREDNISolone acetate 80 MG/ML  Patient tolerance: patient tolerated the procedure well with no immediate complications        Dwaine Connolly MD    07/19/2017    CC to Rigoberto Solorzano MD

## 2017-07-21 ENCOUNTER — HOSPITAL ENCOUNTER (OUTPATIENT)
Dept: PHYSICAL THERAPY | Facility: HOSPITAL | Age: 62
Setting detail: THERAPIES SERIES
Discharge: HOME | End: 2017-07-21

## 2017-07-21 DIAGNOSIS — M25.561 CHRONIC PAIN OF BOTH KNEES: Primary | ICD-10-CM

## 2017-07-21 DIAGNOSIS — M25.512 CHRONIC LEFT SHOULDER PAIN: ICD-10-CM

## 2017-07-21 DIAGNOSIS — M25.562 CHRONIC PAIN OF BOTH KNEES: Primary | ICD-10-CM

## 2017-07-21 DIAGNOSIS — G89.29 CHRONIC PAIN OF BOTH KNEES: Primary | ICD-10-CM

## 2017-07-21 DIAGNOSIS — M75.52 SHOULDER BURSITIS, LEFT: ICD-10-CM

## 2017-07-21 DIAGNOSIS — M17.11 PATELLOFEMORAL ARTHRITIS OF RIGHT KNEE: ICD-10-CM

## 2017-07-21 DIAGNOSIS — G89.29 CHRONIC LEFT SHOULDER PAIN: ICD-10-CM

## 2017-07-21 PROCEDURE — 97110 THERAPEUTIC EXERCISES: CPT | Performed by: PHYSICAL THERAPIST

## 2017-07-21 NOTE — THERAPY DISCHARGE NOTE
Outpatient Physical Therapy Ortho Treatment Note/Discharge Summary  [facility]     Patient Name: Jose Ramon Murcia  : 1955  MRN: [id number]  Today's Date: 2017      Visit Date: 2017    Visit Dx:    ICD-10-CM ICD-9-CM   1. Chronic pain of both knees M25.561 719.46    M25.562 338.29    G89.29    2. Chronic left shoulder pain M25.512 719.41    G89.29 338.29   3. Shoulder bursitis, left M75.52 726.10   4. Patellofemoral arthritis of right knee M19.90 716.96       Patient Active Problem List   Diagnosis   • Allergic rhinitis   • Cervical spinal stenosis   • ED (erectile dysfunction) of non-organic origin   • BP (high blood pressure)   • Lumbar radiculopathy   • Neoplasm of skin   • Obstructive apnea   • Burning or prickling sensation   • Neck pain   • Benign non-nodular prostatic hyperplasia with lower urinary tract symptoms   • Shoulder pain, acute   • Urinary frequency   • Hyperlipidemia   • Chronic pain of right knee   • Rotator cuff tendonitis        Past Medical History:   Diagnosis Date   • Allergic    • Arthritis    • ED (erectile dysfunction)    • Hypertension         Past Surgical History:   Procedure Laterality Date   • ANAL FISTULOTOMY     • APPENDECTOMY     • COLONOSCOPY     • NECK SURGERY     • WISDOM TOOTH EXTRACTION                                       Exercises       17 0800          Exercise 1    Exercise Name 1 reverse shoulder rolls  -CK      Cueing 1 Verbal;Demo  -CK      Weights/Plates 1 5  -CK      Sets 1 2  -CK      Reps 1 10  -CK      Exercise 2    Exercise Name 2 Sidelying ER  -CK      Cueing 2 Verbal  -CK      Equipment 2 Dumbell  -CK      Weights/Plates 2 4  -CK      Sets 2 2  -CK      Reps 2 10  -CK      Exercise 3    Exercise Name 3 shoulder ext  -CK      Cueing 3 Tactile  -CK      Weights/Plates 3 3 Plates  -CK      Reps 3 15  -CK      Exercise 4    Exercise Name 4 supine clock-over blue roll   -CK      Cueing 4 Demo;Verbal  -CK      Equipment 4 Theraband   -CK      Resistance 4 Green  -CK      Reps 4 10  -CK      Exercise 5    Exercise Name 5 s/l shoulder abd to 90  -CK      Cueing 5 Demo  -CK      Equipment 5 Dumbell  -CK      Weights/Plates 5 4  -CK      Sets 5 2  -CK      Reps 5 10  -CK      Exercise 6    Exercise Name 6 Doorway stretch- arms straight   arms low  -CK      Cueing 6 Verbal  -CK      Reps 6 3  -CK      Time (Seconds) 6 30  -CK      Exercise 8    Exercise Name 8  scapular punches   while on blue noodle  -CK      Cueing 8 Demo  -CK      Equipment 8 Dumbell  -CK      Weights/Plates 8 5  -CK      Reps 8 15  -CK      Exercise 9    Exercise Name 9 blue noodle: scapular squeezes  -CK      Cueing 9 Verbal  -CK      Reps 9 15  -CK      Time (Seconds) 9 3  -CK      Exercise 10    Exercise Name 10 Isolated IR  -CK      Cueing 10 Demo  -CK      Equipment 10 Theraband  -CK      Resistance 10 Blue  -CK      Reps 10 15  -CK      Exercise 13    Exercise Name 13 UBE  -CK      Weights/Plates 13 3  -CK      Time (Minutes) 13 5  -CK      Exercise 14    Exercise Name 14 Lat pulls- Tuff stuff machine  -CK      Cueing 14 Demo  -CK      Weights/Plates 14 3 Plates  -CK      Reps 14 20  -CK      Exercise 15    Exercise Name 15 Rows  -CK      Cueing 15 Demo  -CK      Weights/Plates 15 4 Plates  -CK      Reps 15 20  -CK        User Key  (r) = Recorded By, (t) = Taken By, (c) = Cosigned By    Initials Name Provider Type    CK Mohan S Skip, PT Physical Therapist                               PT OP Goals       07/21/17 0900       PT Short Term Goals    STG 1 Patient will be independent and compliant with initial home exercise program  -CK     STG 1 Progress Met  -CK     STG 2 Patient will be independent with education for symptom management, joint protection and strategies to minimize stress on affected tissues  -CK     STG 2 Progress Met  -CK     Long Term Goals    LTG 1 Patient will be independent and compliant with advanced home exercise program to facilitate self-management  "of symptoms  -CK     LTG 1 Progress Met  -CK     LTG 1 Progress Comments comprehensive handouts given  -CK     LTG 2 Patient will increase shoulder flexion L AROM flexion to 0-160 deg, without painful arc, to increase ease of reaching overhead into cabinets, putting on shirt, etc.  -CK     LTG 2 Progress Met  -CK     LTG 2 Progress Comments 165 with painful arc present  -CK     LTG 3 Patient will be able to reach with L shoulder to L2 for improved IR with donning/doffing belt.  -CK     LTG 3 Progress Partially Met  -CK     LTG 3 Progress Comments posterior back pocket. Increased with AAROM  -CK     LTG 4 Pt. will report L shoulder pain </= 1-2/10 with all daily activities and sleeping.  -CK     LTG 4 Progress Partially Met  -CK     LTG 4 Progress Comments fluctuates from 2-4/10  -CK     LTG 5 Patient will report 50% improvement with stair negotiation and report no episodes of \"giving way.\"  -CK     LTG 5 Progress Met  -CK     LTG 6 Patient will score >/= 60/80 on Knee Outcome Survery, indicating improved perceived functional ability with daily activities  -CK     LTG 6 Progress Partially Met  -CK     LTG 7 Patient will report ability to sit >/= 30 min without shifting in chair/changing knee positions in order for him to better perform his job as a [employer] .   -CK     LTG 7 Progress Met  -CK       User Key  (r) = Recorded By, (t) = Taken By, (c) = Cosigned By    Initials Name Provider Type    KEAGAN Valdivia PT Physical Therapist                Therapy Education       07/21/17 0912          Therapy Education    Given HEP;Symptoms/condition management;Pain management;Posture/body mechanics  -CK      Program Reinforced  -CK      How Provided Verbal;Demonstration;Written  -CK      Provided to Patient  -CK      Level of Understanding Teach back education performed;Verbalized;Demonstrated  -CK        User Key  (r) = Recorded By, (t) = Taken By, (c) = Cosigned By    Initials Name Provider Type    KEAGAN RAMÍREZ " Skip, PT Physical Therapist                Time Calculation:   Start Time: 0830  Stop Time: 0915  Time Calculation (min): 45 min    Therapy Charges for Today     Code Description Service Date Service Provider Modifiers Qty    10703697765 HC PT THER PROC EA 15 MIN 7/21/2017 Mohan Valdivia, PT GP 3          PT G-Codes  Outcome Measure Options: Disabilities of the Arm, Shoulder, and Hand (DASH)  Score: 32     OP PT Discharge Summary  Date of Discharge: 07/21/17  Reason for Discharge: Maximum functional potential achieved, Independent  Outcomes Achieved: Patient able to partially acheive established goals  Discharge Destination: Home with home program  Discharge Instructions: Mr. Doll has been seen for 14 skilled therapy sessions since initiating therapy for B knee pain and L shoulder pain. He reports meeting with ortho to address lingering shoulder pain. Reported to have moderate-severe OA in the L shoulder. Cortisone injection given 7/18/17. B knee pain manageable and patient plans to continue self management of shoulder program at gym until he is ready for further surgical intervention. Plan to discharge to self management at this time. He is agreeable to plan.       Mohan Valdivia, PT  7/21/2017

## 2017-11-21 ENCOUNTER — HOSPITAL ENCOUNTER (EMERGENCY)
Facility: HOSPITAL | Age: 62
Discharge: HOME | End: 2017-11-21
Attending: EMERGENCY MEDICINE | Admitting: EMERGENCY MEDICINE

## 2017-11-21 VITALS
BODY MASS INDEX: 38.19 KG/M2 | SYSTOLIC BLOOD PRESSURE: 180 MMHG | OXYGEN SATURATION: 97 % | HEART RATE: 70 BPM | WEIGHT: 282 LBS | HEIGHT: 72 IN | RESPIRATION RATE: 18 BRPM | DIASTOLIC BLOOD PRESSURE: 100 MMHG | TEMPERATURE: 98.1 F

## 2017-11-21 DIAGNOSIS — K08.89 PAIN, DENTAL: Primary | ICD-10-CM

## 2017-11-21 PROCEDURE — 99283 EMERGENCY DEPT VISIT LOW MDM: CPT

## 2017-11-21 RX ORDER — OXYCODONE HYDROCHLORIDE AND ACETAMINOPHEN 5; 325 MG/1; MG/1
1 TABLET ORAL ONCE
Status: COMPLETED | OUTPATIENT
Start: 2017-11-21 | End: 2017-11-21

## 2017-11-21 RX ORDER — OXYCODONE HYDROCHLORIDE AND ACETAMINOPHEN 5; 325 MG/1; MG/1
1 TABLET ORAL EVERY 4 HOURS PRN
Qty: 15 TABLET | Refills: 0 | Status: ON HOLD | OUTPATIENT
Start: 2017-11-21 | End: 2019-02-05

## 2017-11-21 RX ORDER — CLINDAMYCIN HYDROCHLORIDE 300 MG/1
300 CAPSULE ORAL 4 TIMES DAILY
Qty: 40 CAPSULE | Refills: 0 | Status: ON HOLD | OUTPATIENT
Start: 2017-11-21 | End: 2019-02-05

## 2017-11-21 RX ADMIN — OXYCODONE HYDROCHLORIDE AND ACETAMINOPHEN 1 TABLET: 5; 325 TABLET ORAL at 02:58

## 2017-11-21 NOTE — ED PROVIDER NOTES
EMERGENCY DEPARTMENT ENCOUNTER    CHIEF COMPLAINT  Chief Complaint: Dental pain  History given by: patient   History limited by: n/a  Room Number: 19/19  PMD: Rigoberto Solorzano MD      HPI:  Pt is a 62 y.o. male who presents complaining of right upper dental pain that began yesterday and has gradually worsened. Pt states that he has taken Ibuprofen and Hydrocodone without relief. He denies fever or chills.     Duration:  1 day   Onset: gradual  Timing: constant  Location: right upper  Radiation: none  Quality: pain  Intensity/Severity: moderate  Progression: worsening   Previous Episodes: none  Treatment before arrival: Ibuprofen, Hydrocodone     PAST MEDICAL HISTORY  Active Ambulatory Problems     Diagnosis Date Noted   • Allergic rhinitis 02/24/2016   • Cervical spinal stenosis 02/24/2016   • ED (erectile dysfunction) of non-organic origin 02/24/2016   • BP (high blood pressure) 02/24/2016   • Lumbar radiculopathy 02/24/2016   • Neoplasm of skin 02/24/2016   • Obstructive apnea 02/24/2016   • Burning or prickling sensation 02/24/2016   • Neck pain 03/04/2016   • Benign non-nodular prostatic hyperplasia with lower urinary tract symptoms 03/04/2016   • Shoulder pain, acute 03/04/2016   • Urinary frequency 04/15/2016   • Hyperlipidemia 04/15/2016   • Chronic pain of right knee 02/01/2017   • Rotator cuff tendonitis 03/24/2017     Resolved Ambulatory Problems     Diagnosis Date Noted   • No Resolved Ambulatory Problems     Past Medical History:   Diagnosis Date   • Allergic    • Arthritis    • ED (erectile dysfunction)    • Hypertension    • Kidney stone        PAST SURGICAL HISTORY  Past Surgical History:   Procedure Laterality Date   • ANAL FISTULOTOMY     • APPENDECTOMY     • COLONOSCOPY     • NECK SURGERY     • WISDOM TOOTH EXTRACTION         FAMILY HISTORY  Family History   Problem Relation Age of Onset   • Diabetes Mother    • Thyroid disease Mother    • Hypertension Mother    • Diabetes Father    • Heart disease  Father    • Hypertension Father    • Atrial fibrillation Father    • Cancer Father    • Stroke Maternal Grandfather    • Stroke Paternal Grandfather        SOCIAL HISTORY  Social History     Social History   • Marital status:      Spouse name: N/A   • Number of children: N/A   • Years of education: N/A     Occupational History   • Not on file.     Social History Main Topics   • Smoking status: Never Smoker   • Smokeless tobacco: Never Used   • Alcohol use Yes      Comment: social   • Drug use: No   • Sexual activity: Defer     Other Topics Concern   • Not on file     Social History Narrative   • No narrative on file       ALLERGIES  Clarithromycin and Sulfa antibiotics    REVIEW OF SYSTEMS  Review of Systems   Constitutional: Negative for chills and fever.   HENT: Positive for dental problem (pain). Negative for sore throat.    Gastrointestinal: Negative for nausea and vomiting.   Genitourinary: Negative for dysuria.   Musculoskeletal: Negative for back pain.   Skin: Negative for rash.   Psychiatric/Behavioral: The patient is not nervous/anxious.        PHYSICAL EXAM  ED Triage Vitals   Temp Heart Rate Resp BP SpO2   11/21/17 0220 11/21/17 0219 11/21/17 0219 -- 11/21/17 0219   98.1 °F (36.7 °C) 70 18  97 %      Temp src Heart Rate Source Patient Position BP Location FiO2 (%)   11/21/17 0220 11/21/17 0219 -- -- --   Tympanic Monitor          Physical Exam   Constitutional: No distress.   HENT:   Head: Normocephalic and atraumatic.   Mouth/Throat: Dental caries present.   Several fillings in place. There are caries near the gum line. Tenderness to the 2nd molar.   Eyes: EOM are normal.   Neck: Normal range of motion.   Cardiovascular: Normal heart sounds.    Pulmonary/Chest: No respiratory distress.   Neurological: He is alert.   Skin: Skin is warm and dry.   Nursing note and vitals reviewed.    PROCEDURES  Procedures      PROGRESS AND CONSULTS  ED Course     02:42  HR- 70 Temp- 98.1 °F (36.7 °C) (Tympanic) O2  sat- 97%  Advised pt of the plan to treat pain. Will start on abx. Pt is to f/u with a dentist. Pt understands and agrees with the plan, all questions answered.    02:53  Percocet ordered to treat pain.     MEDICAL DECISION MAKING  Results were reviewed/discussed with the patient and they were also made aware of online access. Pt also made aware that some labs, such as cultures, will not be resulted during ER visit and follow up with PMD is necessary.     MDM  Number of Diagnoses or Management Options  Pain, dental:   Patient Progress  Patient progress: stable         DIAGNOSIS  Final diagnoses:   Pain, dental       DISPOSITION  DISCHARGE    Patient discharged in stable condition.    Reviewed implications of results, diagnosis, meds, responsibility to follow up, warning signs and symptoms of possible worsening, potential complications and reasons to return to ER.    Patient/Family voiced understanding of above instructions.    Discussed plan for discharge, as there is no emergent indication for admission.  Pt/family is agreeable and understands need for follow up and repeat testing.  Pt is aware that discharge does not mean that nothing is wrong but it indicates no emergency is present that requires admission and they must continue care with follow-up as given below or physician of their choice.     FOLLOW-UP  Rigoberto Solorzano MD  [address]  [place] [id number] [phone]          Your Dentist    Call today           Medication List      New Prescriptions          clindamycin 300 MG capsule   Commonly known as:  CLEOCIN   Take 1 capsule by mouth 4 (Four) Times a Day.       oxyCODONE-acetaminophen 5-325 MG per tablet   Commonly known as:  PERCOCET   Take 1 tablet by mouth Every 4 (Four) Hours As Needed for Moderate Pain .           Latest Documented Vital Signs:  As of 2:53 AM  BP-   HR- 70 Temp- 98.1 °F (36.7 °C) (Tympanic) O2 sat- 97%    --  Documentation assistance provided by radha Amador  Perry for Dr Saleem.  Information recorded by the scribe was done at my direction and has been verified and validated by me.        Christine Amador  11/21/17 0254       Amos Saleem MD  11/21/17 0795

## 2017-11-22 ENCOUNTER — TELEPHONE (OUTPATIENT)
Dept: SOCIAL WORK | Facility: HOSPITAL | Age: 62
End: 2017-11-22

## 2018-12-20 ENCOUNTER — HOSPITAL ENCOUNTER (EMERGENCY)
Facility: HOSPITAL | Age: 63
Discharge: HOME | End: 2018-12-20
Attending: EMERGENCY MEDICINE | Admitting: EMERGENCY MEDICINE

## 2018-12-20 ENCOUNTER — APPOINTMENT (OUTPATIENT)
Dept: GENERAL RADIOLOGY | Facility: HOSPITAL | Age: 63
End: 2018-12-20

## 2018-12-20 VITALS
HEIGHT: 72 IN | RESPIRATION RATE: 18 BRPM | HEART RATE: 90 BPM | SYSTOLIC BLOOD PRESSURE: 162 MMHG | WEIGHT: 285 LBS | BODY MASS INDEX: 38.6 KG/M2 | OXYGEN SATURATION: 96 % | DIASTOLIC BLOOD PRESSURE: 109 MMHG | TEMPERATURE: 98.4 F

## 2018-12-20 DIAGNOSIS — M25.561 CHRONIC PAIN OF RIGHT KNEE: Primary | ICD-10-CM

## 2018-12-20 DIAGNOSIS — G89.29 CHRONIC PAIN OF RIGHT KNEE: Primary | ICD-10-CM

## 2018-12-20 PROCEDURE — 99283 EMERGENCY DEPT VISIT LOW MDM: CPT

## 2018-12-20 PROCEDURE — 73560 X-RAY EXAM OF KNEE 1 OR 2: CPT

## 2018-12-20 RX ORDER — HYDROCODONE BITARTRATE AND ACETAMINOPHEN 7.5; 325 MG/1; MG/1
1-2 TABLET ORAL EVERY 8 HOURS PRN
Qty: 15 TABLET | Refills: 0 | Status: ON HOLD | OUTPATIENT
Start: 2018-12-20 | End: 2019-02-05

## 2018-12-20 NOTE — ED TRIAGE NOTES
Pt states that his right knee has been hurting for the past month.   State the pain has gotten worse today.

## 2018-12-20 NOTE — DISCHARGE INSTRUCTIONS
Take Tylenol or Motrin for pain.  Use Lortab for breakthrough pain.  Keep your knee in the immobilizer to help prevent twisting or bending.

## 2018-12-20 NOTE — ED PROVIDER NOTES
EMERGENCY DEPARTMENT ENCOUNTER    CHIEF COMPLAINT  Chief Complaint: right knee pain  History given by: patient  History limited by: nothing  Room Number: 06/06  PMD: Provider, No Known      HPI:  Pt is a 63 y.o. male who presents complaining of intermittent right knee pain that began one year ago and worsened over the past three weeks. Pt states that the pain worsens with movement of the right knee [including placing his right foot near his left knee], bearing weight, ambulating, and at night. Pt denies known injury or recent trauma. Pt denies known fever or rash. Pt states that he has gone to physical therapy PTA with minimal improvement of his pain. Pt also reports that he has taken 800 mg of Ibuprofen at night PTA with no relief of his pain. Pt denies hx of a MRI of the R Knee or steroids to treat the right knee pain. Pt has a hx of hypertension, narcolepsy, and arthritis. There are no other complaints at this time.    Duration: one year  Onset: gradual  Timing: intermittent  Location: right knee  Radiation: none specified  Quality: pain  Intensity/Severity: moderate  Progression: worsened over the past three weeks  Associated Symptoms: none specified  Aggravating Factors: Pt states that the pain worsens with movement of the right knee [including placing his right foot near his left knee], bearing weight, ambulating, and at night.  Alleviating Factors: none specified  Previous Episodes: none specified  Treatment before arrival: Pt states that he has gone to PT PTA with minimal improvement of his pain. Pt also reports that he has taken 800 mg of Ibuprofen at night PTA with no relief of his pain. Pt denies hx of a MRI of the R Knee or steroids to treat the right knee pain.     PAST MEDICAL HISTORY  Active Ambulatory Problems     Diagnosis Date Noted   • Allergic rhinitis 02/24/2016   • Cervical spinal stenosis 02/24/2016   • ED (erectile dysfunction) of non-organic origin 02/24/2016   • BP (high blood pressure)  02/24/2016   • Lumbar radiculopathy 02/24/2016   • Neoplasm of skin 02/24/2016   • Obstructive apnea 02/24/2016   • Burning or prickling sensation 02/24/2016   • Neck pain 03/04/2016   • Benign non-nodular prostatic hyperplasia with lower urinary tract symptoms 03/04/2016   • Shoulder pain, acute 03/04/2016   • Urinary frequency 04/15/2016   • Hyperlipidemia 04/15/2016   • Chronic pain of right knee 02/01/2017   • Rotator cuff tendonitis 03/24/2017     Resolved Ambulatory Problems     Diagnosis Date Noted   • No Resolved Ambulatory Problems     Past Medical History:   Diagnosis Date   • Allergic    • Arthritis    • ED (erectile dysfunction)    • Hypertension    • Kidney stone    • Narcolepsy    • Sleep apnea        PAST SURGICAL HISTORY  Past Surgical History:   Procedure Laterality Date   • ANAL FISTULOTOMY     • APPENDECTOMY     • COLONOSCOPY     • NECK SURGERY     • WISDOM TOOTH EXTRACTION         FAMILY HISTORY  Family History   Problem Relation Age of Onset   • Diabetes Mother    • Thyroid disease Mother    • Hypertension Mother    • Diabetes Father    • Heart disease Father    • Hypertension Father    • Atrial fibrillation Father    • Cancer Father    • Stroke Maternal Grandfather    • Stroke Paternal Grandfather        SOCIAL HISTORY  Social History     Socioeconomic History   • Marital status:      Spouse name: Not on file   • Number of children: Not on file   • Years of education: Not on file   • Highest education level: Not on file   Social Needs   • Financial resource strain: Not on file   • Food insecurity - worry: Not on file   • Food insecurity - inability: Not on file   • Transportation needs - medical: Not on file   • Transportation needs - non-medical: Not on file   Occupational History   • Not on file   Tobacco Use   • Smoking status: Never Smoker   • Smokeless tobacco: Never Used   Substance and Sexual Activity   • Alcohol use: Yes     Comment: social   • Drug use: No   • Sexual activity:  Defer   Other Topics Concern   • Not on file   Social History Narrative   • Not on file       ALLERGIES  Clarithromycin and Sulfa antibiotics    REVIEW OF SYSTEMS  Review of Systems   Constitutional: Negative for activity change, appetite change and fever.   HENT: Negative for congestion and sore throat.    Eyes: Negative.    Respiratory: Negative for cough and shortness of breath.    Cardiovascular: Negative for chest pain and leg swelling.   Gastrointestinal: Negative for abdominal pain, diarrhea and vomiting.   Endocrine: Negative.    Genitourinary: Negative for decreased urine volume and dysuria.   Musculoskeletal: Positive for arthralgias (right knee). Negative for neck pain.   Skin: Negative for rash and wound.   Allergic/Immunologic: Negative.    Neurological: Negative for weakness, numbness and headaches.   Hematological: Negative.    Psychiatric/Behavioral: Negative.    All other systems reviewed and are negative.      PHYSICAL EXAM  ED Triage Vitals   Temp Heart Rate Resp BP SpO2   12/20/18 1550 12/20/18 1550 12/20/18 1600 12/20/18 1600 12/20/18 1550   98.4 °F (36.9 °C) 90 18 (!) 162/109 96 %      Temp src Heart Rate Source Patient Position BP Location FiO2 (%)   12/20/18 1550 -- -- -- --   Tympanic           Physical Exam   Constitutional: No distress.   HENT:   Head: Normocephalic and atraumatic.   Eyes: EOM are normal.   Neck: Normal range of motion.   Cardiovascular: Intact distal pulses.   Pulmonary/Chest: No respiratory distress.   Abdominal: There is no tenderness.   Musculoskeletal: He exhibits no edema.        Right knee: He exhibits no swelling, no effusion, no ecchymosis, no deformity and no erythema (or warmth). Tenderness (just medial to the patellar) found.   Pain is reproducible to the medial aspect when placing his right foot on his left knee.   Neurological: He is alert.   Skin: Skin is warm and dry.   Nursing note and vitals reviewed.      RADIOLOGY  XR Knee 1 or 2 View Right   Final  Result       No acute fracture is identified. If there is further clinical concern,   MRI could be considered for further evaluation.               This report was finalized on 12/20/2018 5:32 PM by Dr. Cristi Enamorado M.D.               I ordered the above noted radiological studies. Interpreted by radiologist. Reviewed by me in PACS.       PROCEDURES  Procedures      PROGRESS AND CONSULTS  1701 Initial encounter. Offered the patient pain medication but he declined at this time.    1702 Ordered R Knee XR for further evaluation.    1805 Placed order to obtain and apply a knee immobilizer.    1819 Rechecked the patient who is resting comfortably and in NAD. He is stable. BP- (!) 162/109 HR- 90 Temp- 98.4 °F (36.9 °C) (Tympanic) O2 sat- 96% on RA. Informed the patient of his R Knee XR that shows no acute fracture. Discussed the plan for discharge with a prescription for Norco for breakthrough pain. Advised the patient to take Tylenol or Motrin as needed for pain. Advised the patient to wear his knee immobilizer. Advised the patient to f/u with an orthopedist for further evaluation and management. Strict RTER warnings given. Pt understands and agrees with the plan, all questions answered.   I suspect this patient has a medial meniscus injury.    MEDICAL DECISION MAKING  Results were reviewed/discussed with the patient and they were also made aware of online access. Pt also made aware that some labs, such as cultures, will not be resulted during ER visit and follow up with PMD is necessary.     MDM  Number of Diagnoses or Management Options  Chronic pain of right knee:   Diagnosis management comments: I believe that his right knee pain is d/t his mensicus.        Amount and/or Complexity of Data Reviewed  Tests in the radiology section of CPT®: ordered and reviewed (R Knee XR shows nothing acute.)  Decide to obtain previous medical records or to obtain history from someone other than the patient: yes  Review and  summarize past medical records: yes (Pt has a hx of chronic shoulder pain and bilateral knee pain. Pt has multiple visits for therapy treatment in [facility].)  Independent visualization of images, tracings, or specimens: yes    Patient Progress  Patient progress: stable         DIAGNOSIS  Final diagnoses:   Acute on Chronic pain of right knee       DISPOSITION  DISCHARGE    Patient discharged in stable condition.    Reviewed implications of results, diagnosis, meds, responsibility to follow up, warning signs and symptoms of possible worsening, potential complications and reasons to return to ER, including any new or worsening symptoms.    Patient/Family voiced understanding of above instructions.    Discussed plan for discharge, as there is no emergent indication for admission. Patient referred to primary care provider for BP management due to today's BP. Pt/family is agreeable and understands need for follow up and repeat testing.  Pt is aware that discharge does not mean that nothing is wrong but it indicates no emergency is present that requires admission and they must continue care with follow-up as given below or physician of their choice.     FOLLOW-UP  Dwaine Connolly MD  [facility] [place] [address]  [phone]      Call in the a.m. for an appointment in the next several days., Return if pain worsens, If symptoms worsen, fever, any concerns         Medication List      New Prescriptions    HYDROcodone-acetaminophen 7.5-325 MG per tablet  Commonly known as:  NORCO  Take 1-2 tablets by mouth Every 8 (Eight) Hours As Needed for Moderate   Pain  for up to 15 doses.              Latest Documented Vital Signs:  As of 7:21 PM  BP- (!) 162/109 HR- 90 Temp- 98.4 °F (36.9 °C) (Tympanic) O2 sat- 96%    --  Documentation assistance provided by radha Worthy for Dr. Rolando MD.  Information recorded by the radha was done at my direction and has been verified and validated by me.       Zaynab  Carlyn  12/20/18 1821       Amos Marshall MD  12/20/18 1921

## 2019-01-11 ENCOUNTER — OFFICE VISIT (OUTPATIENT)
Dept: ORTHOPEDIC SURGERY | Facility: CLINIC | Age: 64
End: 2019-01-11

## 2019-01-11 VITALS — TEMPERATURE: 98.3 F | BODY MASS INDEX: 39.06 KG/M2 | HEIGHT: 72 IN | WEIGHT: 288.4 LBS

## 2019-01-11 DIAGNOSIS — M25.561 ACUTE PAIN OF RIGHT KNEE: Primary | ICD-10-CM

## 2019-01-11 PROCEDURE — 99213 OFFICE O/P EST LOW 20 MIN: CPT | Performed by: ORTHOPAEDIC SURGERY

## 2019-01-11 RX ORDER — IBUPROFEN 800 MG/1
800 TABLET ORAL
COMMUNITY
End: 2019-02-22

## 2019-01-13 NOTE — PROGRESS NOTES
Patient: Jose Ramon Murcia    YOB: 1955    Medical Record Number: [id number]    Chief Complaints:  Right knee pain    History of Present Illness:     63 y.o. male patient who presents with a recent history of pain and dysfunction affecting the right knee.  He reports a intermittent history of pain in the right knee for years.  Over the past 2 months, he has experienced much worse pain along with intermittent mechanical symptoms.  He reports catching when going up and down stairs and occasional giving out.  His pain is medial.  He tells me that he has been limping as result of the pain.  He was seen in the emergency room and given a brace.  The brace does not seem to help significantly.  Pain is severe, intermittent and stabbing.  He has noticed associated swelling.  Rest, ice and anti-inflammatories have helped somewhat.    Allergies:   Allergies   Allergen Reactions   • Clarithromycin Other (See Comments)     Unable to eat    • Sulfa Antibiotics Myalgia       Medications:   Home Medications:    Current Outpatient Medications:   •  amphetamine-dextroamphetamine (ADDERALL) 20 MG tablet, Take 20 mg by mouth Daily. Take 2 tablets by mouth every morning and 1 tablet by mouth at noon, Disp: , Rfl:   •  azelastine (ASTELIN) 0.1 % nasal spray, 2 sprays into each nostril 2 (Two) Times a Day. Use in each nostril as directed, Disp: , Rfl:   •  fluticasone (FLONASE) 50 MCG/ACT nasal spray, into each nostril daily., Disp: , Rfl:   •  ibuprofen (ADVIL,MOTRIN) 800 MG tablet, Take 800 mg by mouth., Disp: , Rfl:   •  NEXIUM 40 MG capsule, TK ONE C PO  QD, Disp: , Rfl: 5  •  clindamycin (CLEOCIN) 300 MG capsule, Take 1 capsule by mouth 4 (Four) Times a Day., Disp: 40 capsule, Rfl: 0  •  HYDROcodone-acetaminophen (NORCO) 7.5-325 MG per tablet, Take 1-2 tablets by mouth Every 8 (Eight) Hours As Needed for Moderate Pain  for up to 15 doses., Disp: 15 tablet, Rfl: 0  •  oxyCODONE-acetaminophen (PERCOCET) 5-325 MG per  tablet, Take 1 tablet by mouth Every 4 (Four) Hours As Needed for Moderate Pain ., Disp: 15 tablet, Rfl: 0  •  tamsulosin (FLOMAX) 0.4 MG capsule 24 hr capsule, TAKE 1 CAPSULE BY MOUTH EVERY DAY, Disp: 90 capsule, Rfl: 1    Past Medical History:   Diagnosis Date   • Allergic    • Arthritis    • ED (erectile dysfunction)    • Hypertension    • Kidney stone    • Narcolepsy    • Sleep apnea        Past Surgical History:   Procedure Laterality Date   • ANAL FISTULOTOMY     • APPENDECTOMY     • COLONOSCOPY     • NECK SURGERY     • WISDOM TOOTH EXTRACTION         Social History     Occupational History   • Not on file   Tobacco Use   • Smoking status: Never Smoker   • Smokeless tobacco: Never Used   Substance and Sexual Activity   • Alcohol use: Yes     Comment: social   • Drug use: No   • Sexual activity: Defer      Social History     Social History Narrative   • Not on file       Family History   Problem Relation Age of Onset   • Diabetes Mother    • Thyroid disease Mother    • Hypertension Mother    • Diabetes Father    • Heart disease Father    • Hypertension Father    • Atrial fibrillation Father    • Cancer Father    • Stroke Maternal Grandfather    • Stroke Paternal Grandfather        Review of Systems:      Constitutional: Denies fever, shaking or chills   Eyes: Denies change in visual acuity   HEENT: Denies nasal congestion or sore throat   Respiratory: Denies cough or shortness of breath   Cardiovascular: Denies chest pain or edema  Endocrine: Denies tremors, palpitations, intolerance of heat or cold, polyuria, polydipsia.  GI: Denies abdominal pain, nausea, vomiting, bloody stools or diarrhea  : Denies frequency, urgency, incontinence, retention, or nocturia.  Musculoskeletal: Denies numbness tingling or loss of motor function except as above  Integument: Denies rash, lesion or ulceration   Neurologic: Denies headache or focal weakness, deficits  Heme: Denies epistaxis, spontaneous or excessive bleeding,  "epistaxis, hematuria, melena, fatigue, enlarged or tender lymph nodes.      All other pertinent positives and negatives as noted above in HPI.    Physical Exam: 63 y.o. male  Vitals:    01/11/19 1401   Temp: 98.3 °F (36.8 °C)   TempSrc: Temporal   Weight: 131 kg (288 lb 6.4 oz)   Height: 182.9 cm (72\")     General:  Patient is awake and alert.  Appears in no acute distress or discomfort.    Psych:  Affect and demeanor are appropriate.    Eyes:  Conjunctiva and sclera appear grossly normal.  Eyes track well and EOM seem to be intact.    Ears:  No gross abnormalities.  Hearing adequate for the exam.    Cardiovascular:  Regular rate and rhythm.    Lungs:  Good chest expansion.  Breathing unlabored.    Spine:  Back appears grossly normal.  No palpable masses or adenopathy.  Good motion.  Straight leg raise and crossed straight leg raise manuever are both negative for lower leg and/or knee pain.    Right Lower Extremity:  He had a knee immobilizer in place which was removed.  Skin is benign.  No obvious gross abnormalities about the knee.  Trace effusion.  No palpable masses or adenopathy.  Moderate to severe tenderness noted over medial joint line.  Motion is to 125° of flexion, full extension.  Markedly positive medial Yoni's for pain.  No instability.  Strength is well preserved including hip flexion, knee extension, ankle and toe plantarflexion, ankle inversion and eversion.  Good sensory function throughout the leg and foot.  Palpable pulses.  Gait is mildly antalgic.         Radiology: Bilateral standing AP views, bilateral merchants views, and a lateral view of the right knee are ordered by myself and reviewed to evaluate the patient's complaint.  No comparison films are immediately available.  The x-rays show mild medial joint space narrowing.  There is incidental note of enthesophyte formation off of the superior pole of the patella.  No lesions, masses, profound degenerative changes or other concerning " findings.    Assessment/Plan:  Right knee pain, possible degenerative medial meniscal tear    We discussed options for him.  He is having trouble bearing weight and reports intermittent catching and popping.  Given his symptomatology and exam, I have recommended referral for an MRI to evaluate for a potentially unstable meniscal tear.  I told him that I will call him with the results and we will come up with a plan pending review of that study.  I recommend that he discontinue use of the knee immobilizer and start working on range of motion as tolerated.    Dwaine Connolly MD    01/11/2019

## 2019-01-18 ENCOUNTER — HOSPITAL ENCOUNTER (OUTPATIENT)
Dept: MRI IMAGING | Facility: HOSPITAL | Age: 64
Discharge: HOME | End: 2019-01-18
Attending: ORTHOPAEDIC SURGERY | Admitting: ORTHOPAEDIC SURGERY

## 2019-01-18 DIAGNOSIS — M25.561 ACUTE PAIN OF RIGHT KNEE: ICD-10-CM

## 2019-01-18 PROCEDURE — 73721 MRI JNT OF LWR EXTRE W/O DYE: CPT

## 2019-01-21 ENCOUNTER — TELEPHONE (OUTPATIENT)
Dept: ORTHOPEDIC SURGERY | Facility: CLINIC | Age: 64
End: 2019-01-21

## 2019-01-23 ENCOUNTER — TELEPHONE (OUTPATIENT)
Dept: ORTHOPEDIC SURGERY | Facility: CLINIC | Age: 64
End: 2019-01-23

## 2019-01-23 ENCOUNTER — PREP FOR SURGERY (OUTPATIENT)
Dept: OTHER | Facility: HOSPITAL | Age: 64
End: 2019-01-23

## 2019-01-23 DIAGNOSIS — S83.241D ACUTE MEDIAL MENISCUS TEAR OF RIGHT KNEE, SUBSEQUENT ENCOUNTER: Primary | ICD-10-CM

## 2019-01-23 RX ORDER — MELOXICAM 15 MG/1
15 TABLET ORAL DAILY PRN
Qty: 30 TABLET | Refills: 2 | Status: SHIPPED | OUTPATIENT
Start: 2019-01-23 | End: 2019-02-22

## 2019-01-23 RX ORDER — CEFAZOLIN SODIUM 2 G/100ML
2 INJECTION, SOLUTION INTRAVENOUS ONCE
Status: CANCELLED | OUTPATIENT
Start: 2019-02-05 | End: 2019-01-23

## 2019-01-23 NOTE — TELEPHONE ENCOUNTER
I spoke with him.  We discussed his MRI results.  We discussed options.  We will plan on proceeding with a right knee arthroscopy and partial meniscectomy including possible chondroplasty and addressing any unforseen pathology as indicated.  I reviewed details of procedure with patient today and discussed all the risks, benefits, alternatives, and limitations of the procedure in laymen's terms with the risks including but not limited to:  neurovascular damage, bleeding, infection, hematoma, chronic pain, worsening of pain, chondrolysis, swelling, loss of motion, weakness, stiffness, instability, DVT, pulmonary embolus, death, stroke, complex regional pain syndrome, and need for additional procedures.  The patient verbalized understanding, and was given the opportunity to ask and have all questions answered today.  No guarantees were given regarding results of surgery.

## 2019-01-28 PROBLEM — S83.241A ACUTE MEDIAL MENISCUS TEAR OF RIGHT KNEE: Status: ACTIVE | Noted: 2019-01-28

## 2019-02-04 ENCOUNTER — APPOINTMENT (OUTPATIENT)
Dept: PREADMISSION TESTING | Facility: HOSPITAL | Age: 64
End: 2019-02-04

## 2019-02-04 VITALS
DIASTOLIC BLOOD PRESSURE: 99 MMHG | TEMPERATURE: 98.1 F | BODY MASS INDEX: 39.14 KG/M2 | HEIGHT: 72 IN | OXYGEN SATURATION: 95 % | HEART RATE: 84 BPM | SYSTOLIC BLOOD PRESSURE: 157 MMHG | RESPIRATION RATE: 20 BRPM | WEIGHT: 289 LBS

## 2019-02-04 DIAGNOSIS — S83.241D ACUTE MEDIAL MENISCUS TEAR OF RIGHT KNEE, SUBSEQUENT ENCOUNTER: ICD-10-CM

## 2019-02-04 LAB
ANION GAP SERPL CALCULATED.3IONS-SCNC: 11.7 MMOL/L
BILIRUB UR QL STRIP: NEGATIVE
BUN BLD-MCNC: 11 MG/DL (ref 8–23)
BUN/CREAT SERPL: 12.5 (ref 7–25)
CALCIUM SPEC-SCNC: 9.3 MG/DL (ref 8.6–10.5)
CHLORIDE SERPL-SCNC: 99 MMOL/L (ref 98–107)
CLARITY UR: CLEAR
CO2 SERPL-SCNC: 27.3 MMOL/L (ref 22–29)
COLOR UR: YELLOW
CREAT BLD-MCNC: 0.88 MG/DL (ref 0.76–1.27)
DEPRECATED RDW RBC AUTO: 48.4 FL (ref 37–54)
ERYTHROCYTE [DISTWIDTH] IN BLOOD BY AUTOMATED COUNT: 14.7 % (ref 11.5–14.5)
GFR SERPL CREATININE-BSD FRML MDRD: 88 ML/MIN/1.73
GLUCOSE BLD-MCNC: 84 MG/DL (ref 65–99)
GLUCOSE UR STRIP-MCNC: NEGATIVE MG/DL
HCT VFR BLD AUTO: 49 % (ref 40.4–52.2)
HGB BLD-MCNC: 16.6 G/DL (ref 13.7–17.6)
HGB UR QL STRIP.AUTO: NEGATIVE
KETONES UR QL STRIP: NEGATIVE
LEUKOCYTE ESTERASE UR QL STRIP.AUTO: NEGATIVE
MCH RBC QN AUTO: 30.7 PG (ref 27–32.7)
MCHC RBC AUTO-ENTMCNC: 33.9 G/DL (ref 32.6–36.4)
MCV RBC AUTO: 90.6 FL (ref 79.8–96.2)
NITRITE UR QL STRIP: NEGATIVE
PH UR STRIP.AUTO: 8 [PH] (ref 5–8)
PLATELET # BLD AUTO: 178 10*3/MM3 (ref 140–500)
PMV BLD AUTO: 10.8 FL (ref 6–12)
POTASSIUM BLD-SCNC: 3.6 MMOL/L (ref 3.5–5.2)
PROT UR QL STRIP: NEGATIVE
RBC # BLD AUTO: 5.41 10*6/MM3 (ref 4.6–6)
SODIUM BLD-SCNC: 138 MMOL/L (ref 136–145)
SP GR UR STRIP: 1.02 (ref 1–1.03)
UROBILINOGEN UR QL STRIP: NORMAL
WBC NRBC COR # BLD: 10.64 10*3/MM3 (ref 4.5–10.7)

## 2019-02-04 PROCEDURE — 85027 COMPLETE CBC AUTOMATED: CPT | Performed by: ORTHOPAEDIC SURGERY

## 2019-02-04 PROCEDURE — 80048 BASIC METABOLIC PNL TOTAL CA: CPT | Performed by: ORTHOPAEDIC SURGERY

## 2019-02-04 PROCEDURE — 36415 COLL VENOUS BLD VENIPUNCTURE: CPT

## 2019-02-04 PROCEDURE — 93005 ELECTROCARDIOGRAM TRACING: CPT

## 2019-02-04 PROCEDURE — 93010 ELECTROCARDIOGRAM REPORT: CPT | Performed by: INTERNAL MEDICINE

## 2019-02-04 PROCEDURE — 81003 URINALYSIS AUTO W/O SCOPE: CPT | Performed by: ORTHOPAEDIC SURGERY

## 2019-02-04 RX ORDER — ESOMEPRAZOLE MAGNESIUM 40 MG/1
40 CAPSULE, DELAYED RELEASE ORAL 2 TIMES DAILY
COMMUNITY
End: 2021-01-04

## 2019-02-04 NOTE — DISCHARGE INSTRUCTIONS
Take the following medications the morning of surgery with a small sip of water:  NEXIUM        General Instructions:  • Do not eat solid food after midnight the night before surgery.  • You may drink clear liquids day of surgery but must stop at least one hour before your hospital arrival time.  • It is beneficial for you to have a clear drink that contains carbohydrates the day of surgery.  We suggest a 12 to 20 ounce bottle of Gatorade or Powerade for non-diabetic patients or a 12 to 20 ounce bottle of G2 or Powerade Zero for diabetic patients. (Pediatric patients, are not advised to drink a 12 to 20 ounce carbohydrate drink)    Clear liquids are liquids you can see through.  Nothing red in color.     Plain water                               Sports drinks  Sodas                                   Gelatin (Jell-O)  Fruit juices without pulp such as white grape juice and apple juice  Popsicles that contain no fruit or yogurt  Tea or coffee (no cream or milk added)  Gatorade / Powerade  G2 / Powerade Zero    • Infants may have breast milk up to four hours before surgery.  • Infants drinking formula may drink formula up to six hours before surgery.   • Patients who avoid smoking, chewing tobacco and alcohol for 4 weeks prior to surgery have a reduced risk of post-operative complications.  Quit smoking as many days before surgery as you can.  • Do not smoke, use chewing tobacco or drink alcohol the day of surgery.   • If applicable bring your C-PAP/ BI-PAP machine.  • Bring any papers given to you in the doctor’s office.  • Wear clean comfortable clothes and socks.  • Do not wear contact lenses or make-up.  Bring a case for your glasses.   • Bring crutches or walker if applicable.  • Remove all piercings.  Leave jewelry and any other valuables at home.  • Hair extensions with metal clips must be removed prior to surgery.  • The Pre-Admission Testing nurse will instruct you to bring medications if unable to obtain an  accurate list in Pre-Admission Testing.        If you were given a blood bank ID arm band remember to bring it with you the day of surgery.    Preventing a Surgical Site Infection:  • For 2 to 3 days before surgery, avoid shaving with a razor because the razor can irritate skin and make it easier to develop an infection.    • Any areas of open skin can increase the risk of a post-operative wound infection by allowing bacteria to enter and travel throughout the body.  Notify your surgeon if you have any skin wounds / rashes even if it is not near the expected surgical site.  The area will need assessed to determine if surgery should be delayed until it is healed.  • The night prior to surgery sleep in a clean bed with clean clothing.  Do not allow pets to sleep with you.  • Shower on the morning of surgery using a fresh bar of anti-bacterial soap (such as Dial) and clean washcloth.  Dry with a clean towel and dress in clean clothing.  • Ask your surgeon if you will be receiving antibiotics prior to surgery.  • Make sure you, your family, and all healthcare providers clean their hands with soap and water or an alcohol based hand  before caring for you or your wound.    Day of surgery:  Upon arrival, a Pre-op nurse and Anesthesiologist will review your health history, obtain vital signs, and answer questions you may have.  The only belongings needed at this time will be your home medications and if applicable your C-PAP/BI-PAP machine.  If you are staying overnight your family can leave the rest of your belongings in the car and bring them to your room later.  A Pre-op nurse will start an IV and you may receive medication in preparation for surgery, including something to help you relax.  Your family will be able to see you in the Pre-op area.  While you are in surgery your family should notify the waiting room  if they leave the waiting room area and provide a contact phone number.    Please be  aware that surgery does come with discomfort.  We want to make every effort to control your discomfort so please discuss any uncontrolled symptoms with your nurse.   Your doctor will most likely have prescribed pain medications.      If you are going home after surgery you will receive individualized written care instructions before being discharged.  A responsible adult must drive you to and from the hospital on the day of your surgery and stay with you for 24 hours.    If you are staying overnight following surgery, you will be transported to your hospital room following the recovery period.  [facility] has all private rooms.    You have received a list of surgical assistants for your reference.  If you have any questions please call Pre-Admission Testing at [phone].  Deductibles and co-payments are collected on the day of service. Please be prepared to pay the required co-pay, deductible or deposit on the day of service as defined by your plan.

## 2019-02-05 ENCOUNTER — ANESTHESIA (OUTPATIENT)
Dept: PERIOP | Facility: HOSPITAL | Age: 64
End: 2019-02-05

## 2019-02-05 ENCOUNTER — ANESTHESIA EVENT (OUTPATIENT)
Dept: PERIOP | Facility: HOSPITAL | Age: 64
End: 2019-02-05

## 2019-02-05 ENCOUNTER — HOSPITAL ENCOUNTER (OUTPATIENT)
Facility: HOSPITAL | Age: 64
Setting detail: HOSPITAL OUTPATIENT SURGERY
Discharge: HOME | End: 2019-02-05
Attending: ORTHOPAEDIC SURGERY | Admitting: ORTHOPAEDIC SURGERY

## 2019-02-05 VITALS
TEMPERATURE: 98.5 F | DIASTOLIC BLOOD PRESSURE: 101 MMHG | HEART RATE: 79 BPM | SYSTOLIC BLOOD PRESSURE: 162 MMHG | RESPIRATION RATE: 20 BRPM | OXYGEN SATURATION: 95 %

## 2019-02-05 DIAGNOSIS — S83.241D ACUTE MEDIAL MENISCUS TEAR OF RIGHT KNEE, SUBSEQUENT ENCOUNTER: ICD-10-CM

## 2019-02-05 PROCEDURE — G0463 HOSPITAL OUTPT CLINIC VISIT: HCPCS | Performed by: ORTHOPAEDIC SURGERY

## 2019-02-05 PROCEDURE — G0463 HOSPITAL OUTPT CLINIC VISIT: HCPCS

## 2019-02-05 RX ORDER — SODIUM CHLORIDE, SODIUM LACTATE, POTASSIUM CHLORIDE, CALCIUM CHLORIDE 600; 310; 30; 20 MG/100ML; MG/100ML; MG/100ML; MG/100ML
9 INJECTION, SOLUTION INTRAVENOUS CONTINUOUS
Status: DISCONTINUED | OUTPATIENT
Start: 2019-02-05 | End: 2019-02-05 | Stop reason: HOSPADM

## 2019-02-05 RX ORDER — SODIUM CHLORIDE 0.9 % (FLUSH) 0.9 %
1-10 SYRINGE (ML) INJECTION AS NEEDED
Status: DISCONTINUED | OUTPATIENT
Start: 2019-02-05 | End: 2019-02-05 | Stop reason: HOSPADM

## 2019-02-05 RX ORDER — LIDOCAINE HYDROCHLORIDE 10 MG/ML
0.5 INJECTION, SOLUTION EPIDURAL; INFILTRATION; INTRACAUDAL; PERINEURAL ONCE AS NEEDED
Status: DISCONTINUED | OUTPATIENT
Start: 2019-02-05 | End: 2019-02-05 | Stop reason: HOSPADM

## 2019-02-05 RX ORDER — CEFAZOLIN SODIUM 2 G/100ML
2 INJECTION, SOLUTION INTRAVENOUS ONCE
Status: DISCONTINUED | OUTPATIENT
Start: 2019-02-05 | End: 2019-02-05 | Stop reason: HOSPADM

## 2019-02-05 RX ORDER — FAMOTIDINE 10 MG/ML
20 INJECTION, SOLUTION INTRAVENOUS ONCE
Status: DISCONTINUED | OUTPATIENT
Start: 2019-02-05 | End: 2019-02-05 | Stop reason: HOSPADM

## 2019-02-05 RX ORDER — FENTANYL CITRATE 50 UG/ML
50 INJECTION, SOLUTION INTRAMUSCULAR; INTRAVENOUS
Status: DISCONTINUED | OUTPATIENT
Start: 2019-02-05 | End: 2019-02-05 | Stop reason: HOSPADM

## 2019-02-05 RX ORDER — MIDAZOLAM HYDROCHLORIDE 1 MG/ML
1 INJECTION INTRAMUSCULAR; INTRAVENOUS
Status: DISCONTINUED | OUTPATIENT
Start: 2019-02-05 | End: 2019-02-05 | Stop reason: HOSPADM

## 2019-02-05 RX ORDER — MIDAZOLAM HYDROCHLORIDE 1 MG/ML
2 INJECTION INTRAMUSCULAR; INTRAVENOUS
Status: DISCONTINUED | OUTPATIENT
Start: 2019-02-05 | End: 2019-02-05 | Stop reason: HOSPADM

## 2019-02-05 RX ORDER — ONDANSETRON 2 MG/ML
4 INJECTION INTRAMUSCULAR; INTRAVENOUS ONCE AS NEEDED
Status: DISCONTINUED | OUTPATIENT
Start: 2019-02-05 | End: 2019-02-05 | Stop reason: HOSPADM

## 2019-02-05 NOTE — PROGRESS NOTES
I met with the patient in the preoperative area.  Over the weekend, he slipped on some ice while traveling and suffered an abrasion to his right knee.  The abrasion directly overlies the anterolateral aspect of his knee at the site for the anterolateral portal.  The long conversation with the patient and his wife.  I recommend delaying the surgery due to the elevated risk of infection posed by the abrasion.  They agreed.  We will look at getting him rescheduled.

## 2019-02-05 NOTE — ANESTHESIA PREPROCEDURE EVALUATION
Anesthesia Evaluation     Patient summary reviewed and Nursing notes reviewed   history of anesthetic complications: PONV  NPO Solid Status: > 8 hours  NPO Liquid Status: > 2 hours           Airway   Mallampati: II  TM distance: >3 FB  Neck ROM: full  no difficulty expected  Dental - normal exam     Pulmonary - normal exam   (+) sleep apnea on CPAP,   (-) COPD, asthma, not a smoker, lung cancer  Cardiovascular - normal exam  Exercise tolerance: excellent (>7 METS)    ECG reviewed  Rhythm: regular  Rate: normal    (+) hypertension well controlled less than 2 medications, hyperlipidemia,   (-) valvular problems/murmurs, past MI, CAD, dysrhythmias, angina, CHF, cardiac stents      Neuro/Psych  (+) numbness, psychiatric history,     (-) seizures, TIA, CVA  GI/Hepatic/Renal/Endo    (+) morbid obesity, GERD,  renal disease stones,   (-) hepatitis, liver disease, diabetes, hypothyroidism, GI bleed    Musculoskeletal     (+) neck pain,   Abdominal  - normal exam   Substance History - negative use     OB/GYN negative ob/gyn ROS         Other   (+) arthritis                   Anesthesia Plan    ASA 3     general     intravenous induction   Anesthetic plan, all risks, benefits, and alternatives have been provided, discussed and informed consent has been obtained with: patient.    Plan discussed with CRNA and attending.

## 2019-02-06 ENCOUNTER — PREP FOR SURGERY (OUTPATIENT)
Dept: OTHER | Facility: HOSPITAL | Age: 64
End: 2019-02-06

## 2019-02-06 DIAGNOSIS — S83.241D ACUTE MEDIAL MENISCUS TEAR OF RIGHT KNEE, SUBSEQUENT ENCOUNTER: Primary | ICD-10-CM

## 2019-02-06 RX ORDER — CEFAZOLIN SODIUM 2 G/100ML
2 INJECTION, SOLUTION INTRAVENOUS ONCE
Status: CANCELLED | OUTPATIENT
Start: 2019-02-12 | End: 2019-02-06

## 2019-02-12 ENCOUNTER — HOSPITAL ENCOUNTER (OUTPATIENT)
Facility: HOSPITAL | Age: 64
Setting detail: HOSPITAL OUTPATIENT SURGERY
Discharge: HOME | End: 2019-02-12
Attending: ORTHOPAEDIC SURGERY | Admitting: ORTHOPAEDIC SURGERY

## 2019-02-12 ENCOUNTER — ANESTHESIA EVENT (OUTPATIENT)
Dept: PERIOP | Facility: HOSPITAL | Age: 64
End: 2019-02-12

## 2019-02-12 ENCOUNTER — ANESTHESIA (OUTPATIENT)
Dept: PERIOP | Facility: HOSPITAL | Age: 64
End: 2019-02-12

## 2019-02-12 VITALS
SYSTOLIC BLOOD PRESSURE: 133 MMHG | TEMPERATURE: 98 F | OXYGEN SATURATION: 97 % | DIASTOLIC BLOOD PRESSURE: 74 MMHG | RESPIRATION RATE: 18 BRPM | HEART RATE: 74 BPM

## 2019-02-12 DIAGNOSIS — S83.241D ACUTE MEDIAL MENISCUS TEAR OF RIGHT KNEE, SUBSEQUENT ENCOUNTER: ICD-10-CM

## 2019-02-12 PROCEDURE — 25010000002 HYDRALAZINE PER 20 MG: Performed by: ANESTHESIOLOGY

## 2019-02-12 PROCEDURE — 25010000002 DEXAMETHASONE PER 1 MG: Performed by: ANESTHESIOLOGY

## 2019-02-12 PROCEDURE — 25010000002 FENTANYL CITRATE (PF) 100 MCG/2ML SOLUTION: Performed by: ANESTHESIOLOGY

## 2019-02-12 PROCEDURE — 25010000002 KETOROLAC TROMETHAMINE PER 15 MG: Performed by: ANESTHESIOLOGY

## 2019-02-12 PROCEDURE — 29881 ARTHRS KNE SRG MNISECTMY M/L: CPT | Performed by: ORTHOPAEDIC SURGERY

## 2019-02-12 PROCEDURE — 25010000003 CEFAZOLIN IN DEXTROSE 2-4 GM/100ML-% SOLUTION: Performed by: NURSE PRACTITIONER

## 2019-02-12 PROCEDURE — 25010000002 MIDAZOLAM PER 1 MG: Performed by: ANESTHESIOLOGY

## 2019-02-12 PROCEDURE — 25010000002 PROPOFOL 10 MG/ML EMULSION: Performed by: ANESTHESIOLOGY

## 2019-02-12 PROCEDURE — 25010000002 ONDANSETRON PER 1 MG: Performed by: ANESTHESIOLOGY

## 2019-02-12 RX ORDER — OXYCODONE AND ACETAMINOPHEN 7.5; 325 MG/1; MG/1
1 TABLET ORAL ONCE AS NEEDED
Status: COMPLETED | OUTPATIENT
Start: 2019-02-12 | End: 2019-02-12

## 2019-02-12 RX ORDER — FENTANYL CITRATE 50 UG/ML
50 INJECTION, SOLUTION INTRAMUSCULAR; INTRAVENOUS
Status: DISCONTINUED | OUTPATIENT
Start: 2019-02-12 | End: 2019-02-12 | Stop reason: HOSPADM

## 2019-02-12 RX ORDER — ONDANSETRON 4 MG/1
4 TABLET, FILM COATED ORAL EVERY 8 HOURS PRN
Qty: 30 TABLET | Refills: 0 | Status: SHIPPED | OUTPATIENT
Start: 2019-02-12 | End: 2019-02-22

## 2019-02-12 RX ORDER — DOCUSATE SODIUM 100 MG/1
100 CAPSULE, LIQUID FILLED ORAL 2 TIMES DAILY
Qty: 60 CAPSULE | Refills: 0 | Status: SHIPPED | OUTPATIENT
Start: 2019-02-12 | End: 2019-02-22

## 2019-02-12 RX ORDER — HYDRALAZINE HYDROCHLORIDE 20 MG/ML
INJECTION INTRAMUSCULAR; INTRAVENOUS AS NEEDED
Status: DISCONTINUED | OUTPATIENT
Start: 2019-02-12 | End: 2019-02-12 | Stop reason: SURG

## 2019-02-12 RX ORDER — ACETAMINOPHEN 325 MG/1
650 TABLET ORAL ONCE AS NEEDED
Status: DISCONTINUED | OUTPATIENT
Start: 2019-02-12 | End: 2019-02-12 | Stop reason: HOSPADM

## 2019-02-12 RX ORDER — MIDAZOLAM HYDROCHLORIDE 1 MG/ML
2 INJECTION INTRAMUSCULAR; INTRAVENOUS
Status: DISCONTINUED | OUTPATIENT
Start: 2019-02-12 | End: 2019-02-12 | Stop reason: HOSPADM

## 2019-02-12 RX ORDER — HYDROCODONE BITARTRATE AND ACETAMINOPHEN 5; 325 MG/1; MG/1
1 TABLET ORAL ONCE AS NEEDED
Status: DISCONTINUED | OUTPATIENT
Start: 2019-02-12 | End: 2019-02-12 | Stop reason: HOSPADM

## 2019-02-12 RX ORDER — NALBUPHINE HCL 10 MG/ML
2 AMPUL (ML) INJECTION EVERY 4 HOURS PRN
Status: DISCONTINUED | OUTPATIENT
Start: 2019-02-12 | End: 2019-02-12 | Stop reason: HOSPADM

## 2019-02-12 RX ORDER — DEXAMETHASONE SODIUM PHOSPHATE 10 MG/ML
INJECTION INTRAMUSCULAR; INTRAVENOUS AS NEEDED
Status: DISCONTINUED | OUTPATIENT
Start: 2019-02-12 | End: 2019-02-12 | Stop reason: SURG

## 2019-02-12 RX ORDER — PROMETHAZINE HYDROCHLORIDE 25 MG/1
25 TABLET ORAL ONCE AS NEEDED
Status: DISCONTINUED | OUTPATIENT
Start: 2019-02-12 | End: 2019-02-12 | Stop reason: HOSPADM

## 2019-02-12 RX ORDER — DIPHENHYDRAMINE HYDROCHLORIDE 50 MG/ML
12.5 INJECTION INTRAMUSCULAR; INTRAVENOUS
Status: DISCONTINUED | OUTPATIENT
Start: 2019-02-12 | End: 2019-02-12 | Stop reason: HOSPADM

## 2019-02-12 RX ORDER — NALOXONE HCL 0.4 MG/ML
0.4 VIAL (ML) INJECTION AS NEEDED
Status: DISCONTINUED | OUTPATIENT
Start: 2019-02-12 | End: 2019-02-12 | Stop reason: HOSPADM

## 2019-02-12 RX ORDER — HYDROMORPHONE HYDROCHLORIDE 1 MG/ML
0.5 INJECTION, SOLUTION INTRAMUSCULAR; INTRAVENOUS; SUBCUTANEOUS
Status: DISCONTINUED | OUTPATIENT
Start: 2019-02-12 | End: 2019-02-12 | Stop reason: HOSPADM

## 2019-02-12 RX ORDER — PROMETHAZINE HYDROCHLORIDE 25 MG/ML
6.25 INJECTION, SOLUTION INTRAMUSCULAR; INTRAVENOUS ONCE AS NEEDED
Status: DISCONTINUED | OUTPATIENT
Start: 2019-02-12 | End: 2019-02-12 | Stop reason: HOSPADM

## 2019-02-12 RX ORDER — ONDANSETRON 2 MG/ML
INJECTION INTRAMUSCULAR; INTRAVENOUS AS NEEDED
Status: DISCONTINUED | OUTPATIENT
Start: 2019-02-12 | End: 2019-02-12 | Stop reason: SURG

## 2019-02-12 RX ORDER — FENTANYL CITRATE 50 UG/ML
INJECTION, SOLUTION INTRAMUSCULAR; INTRAVENOUS AS NEEDED
Status: DISCONTINUED | OUTPATIENT
Start: 2019-02-12 | End: 2019-02-12 | Stop reason: SURG

## 2019-02-12 RX ORDER — SODIUM CHLORIDE 0.9 % (FLUSH) 0.9 %
3 SYRINGE (ML) INJECTION EVERY 12 HOURS SCHEDULED
Status: DISCONTINUED | OUTPATIENT
Start: 2019-02-12 | End: 2019-02-12 | Stop reason: HOSPADM

## 2019-02-12 RX ORDER — ACETAMINOPHEN 500 MG
500 TABLET ORAL ONCE
Status: COMPLETED | OUTPATIENT
Start: 2019-02-12 | End: 2019-02-12

## 2019-02-12 RX ORDER — PROPOFOL 10 MG/ML
VIAL (ML) INTRAVENOUS AS NEEDED
Status: DISCONTINUED | OUTPATIENT
Start: 2019-02-12 | End: 2019-02-12 | Stop reason: SURG

## 2019-02-12 RX ORDER — FENTANYL CITRATE 50 UG/ML
INJECTION, SOLUTION INTRAMUSCULAR; INTRAVENOUS
Status: COMPLETED
Start: 2019-02-12 | End: 2019-02-12

## 2019-02-12 RX ORDER — HYDRALAZINE HYDROCHLORIDE 20 MG/ML
5 INJECTION INTRAMUSCULAR; INTRAVENOUS
Status: DISCONTINUED | OUTPATIENT
Start: 2019-02-12 | End: 2019-02-12 | Stop reason: HOSPADM

## 2019-02-12 RX ORDER — BUPIVACAINE HYDROCHLORIDE AND EPINEPHRINE 5; 5 MG/ML; UG/ML
INJECTION, SOLUTION PERINEURAL AS NEEDED
Status: DISCONTINUED | OUTPATIENT
Start: 2019-02-12 | End: 2019-02-12 | Stop reason: HOSPADM

## 2019-02-12 RX ORDER — PROMETHAZINE HYDROCHLORIDE 25 MG/1
25 SUPPOSITORY RECTAL ONCE AS NEEDED
Status: DISCONTINUED | OUTPATIENT
Start: 2019-02-12 | End: 2019-02-12 | Stop reason: HOSPADM

## 2019-02-12 RX ORDER — GLYCOPYRROLATE 0.2 MG/ML
0.2 INJECTION INTRAMUSCULAR; INTRAVENOUS ONCE
Status: COMPLETED | OUTPATIENT
Start: 2019-02-12 | End: 2019-02-12

## 2019-02-12 RX ORDER — MIDAZOLAM HYDROCHLORIDE 1 MG/ML
1 INJECTION INTRAMUSCULAR; INTRAVENOUS
Status: DISCONTINUED | OUTPATIENT
Start: 2019-02-12 | End: 2019-02-12 | Stop reason: HOSPADM

## 2019-02-12 RX ORDER — ISOPROPYL ALCOHOL 70 ML/100ML
LIQUID TOPICAL AS NEEDED
Status: DISCONTINUED | OUTPATIENT
Start: 2019-02-12 | End: 2019-02-12 | Stop reason: HOSPADM

## 2019-02-12 RX ORDER — LIDOCAINE HYDROCHLORIDE 10 MG/ML
0.5 INJECTION, SOLUTION EPIDURAL; INFILTRATION; INTRACAUDAL; PERINEURAL ONCE AS NEEDED
Status: COMPLETED | OUTPATIENT
Start: 2019-02-12 | End: 2019-02-12

## 2019-02-12 RX ORDER — OXYCODONE AND ACETAMINOPHEN 7.5; 325 MG/1; MG/1
1 TABLET ORAL EVERY 4 HOURS PRN
Qty: 42 TABLET | Refills: 0 | Status: SHIPPED | OUTPATIENT
Start: 2019-02-12 | End: 2019-03-22

## 2019-02-12 RX ORDER — ACETAMINOPHEN 650 MG/1
650 SUPPOSITORY RECTAL ONCE AS NEEDED
Status: DISCONTINUED | OUTPATIENT
Start: 2019-02-12 | End: 2019-02-12 | Stop reason: HOSPADM

## 2019-02-12 RX ORDER — SODIUM CHLORIDE, SODIUM LACTATE, POTASSIUM CHLORIDE, CALCIUM CHLORIDE 600; 310; 30; 20 MG/100ML; MG/100ML; MG/100ML; MG/100ML
9 INJECTION, SOLUTION INTRAVENOUS CONTINUOUS
Status: DISCONTINUED | OUTPATIENT
Start: 2019-02-12 | End: 2019-02-12 | Stop reason: HOSPADM

## 2019-02-12 RX ORDER — CEFAZOLIN SODIUM 2 G/100ML
2 INJECTION, SOLUTION INTRAVENOUS ONCE
Status: COMPLETED | OUTPATIENT
Start: 2019-02-12 | End: 2019-02-12

## 2019-02-12 RX ORDER — OXYCODONE AND ACETAMINOPHEN 7.5; 325 MG/1; MG/1
TABLET ORAL
Status: COMPLETED
Start: 2019-02-12 | End: 2019-02-12

## 2019-02-12 RX ORDER — SODIUM CHLORIDE 0.9 % (FLUSH) 0.9 %
1-10 SYRINGE (ML) INJECTION AS NEEDED
Status: DISCONTINUED | OUTPATIENT
Start: 2019-02-12 | End: 2019-02-12 | Stop reason: HOSPADM

## 2019-02-12 RX ORDER — NALBUPHINE HCL 10 MG/ML
10 AMPUL (ML) INJECTION EVERY 4 HOURS PRN
Status: DISCONTINUED | OUTPATIENT
Start: 2019-02-12 | End: 2019-02-12 | Stop reason: HOSPADM

## 2019-02-12 RX ORDER — SCOLOPAMINE TRANSDERMAL SYSTEM 1 MG/1
1 PATCH, EXTENDED RELEASE TRANSDERMAL CONTINUOUS
Status: DISCONTINUED | OUTPATIENT
Start: 2019-02-12 | End: 2019-02-12 | Stop reason: HOSPADM

## 2019-02-12 RX ORDER — KETOROLAC TROMETHAMINE 30 MG/ML
INJECTION, SOLUTION INTRAMUSCULAR; INTRAVENOUS AS NEEDED
Status: DISCONTINUED | OUTPATIENT
Start: 2019-02-12 | End: 2019-02-12 | Stop reason: SURG

## 2019-02-12 RX ADMIN — SODIUM CHLORIDE, POTASSIUM CHLORIDE, SODIUM LACTATE AND CALCIUM CHLORIDE 9 ML/HR: 600; 310; 30; 20 INJECTION, SOLUTION INTRAVENOUS at 13:34

## 2019-02-12 RX ADMIN — LIDOCAINE HYDROCHLORIDE 0.5 ML: 10 INJECTION, SOLUTION EPIDURAL; INFILTRATION; INTRACAUDAL; PERINEURAL at 13:34

## 2019-02-12 RX ADMIN — SODIUM CHLORIDE, POTASSIUM CHLORIDE, SODIUM LACTATE AND CALCIUM CHLORIDE: 600; 310; 30; 20 INJECTION, SOLUTION INTRAVENOUS at 16:10

## 2019-02-12 RX ADMIN — ACETAMINOPHEN 500 MG: 500 TABLET, FILM COATED ORAL at 13:50

## 2019-02-12 RX ADMIN — DEXAMETHASONE SODIUM PHOSPHATE 8 MG: 10 INJECTION INTRAMUSCULAR; INTRAVENOUS at 16:20

## 2019-02-12 RX ADMIN — PROPOFOL 200 MG: 10 INJECTION, EMULSION INTRAVENOUS at 16:15

## 2019-02-12 RX ADMIN — FENTANYL CITRATE 50 MCG: 50 INJECTION INTRAMUSCULAR; INTRAVENOUS at 16:13

## 2019-02-12 RX ADMIN — GLYCOPYRROLATE 0.2 MG: 0.2 INJECTION, SOLUTION INTRAMUSCULAR; INTRAVENOUS at 14:49

## 2019-02-12 RX ADMIN — OXYCODONE AND ACETAMINOPHEN 1 TABLET: 7.5; 325 TABLET ORAL at 17:28

## 2019-02-12 RX ADMIN — CEFAZOLIN SODIUM 2 G: 2 INJECTION, SOLUTION INTRAVENOUS at 16:11

## 2019-02-12 RX ADMIN — KETOROLAC TROMETHAMINE 30 MG: 30 INJECTION, SOLUTION INTRAMUSCULAR; INTRAVENOUS at 16:20

## 2019-02-12 RX ADMIN — ONDANSETRON 4 MG: 2 INJECTION INTRAMUSCULAR; INTRAVENOUS at 16:20

## 2019-02-12 RX ADMIN — OXYCODONE HYDROCHLORIDE AND ACETAMINOPHEN 1 TABLET: 7.5; 325 TABLET ORAL at 17:28

## 2019-02-12 RX ADMIN — FENTANYL CITRATE 50 MCG: 50 INJECTION INTRAMUSCULAR; INTRAVENOUS at 16:32

## 2019-02-12 RX ADMIN — HYDRALAZINE HYDROCHLORIDE 5 MG: 20 INJECTION INTRAMUSCULAR; INTRAVENOUS at 16:45

## 2019-02-12 RX ADMIN — SCOPALAMINE 1 PATCH: 1 PATCH, EXTENDED RELEASE TRANSDERMAL at 13:52

## 2019-02-12 RX ADMIN — MIDAZOLAM 2 MG: 1 INJECTION INTRAMUSCULAR; INTRAVENOUS at 13:52

## 2019-02-12 NOTE — H&P
Patient: Jose Ramon Murcia     YOB: 1955     Medical Record Number: [id number]     Chief Complaints:  Right knee pain     History of Present Illness:      63 y.o. male patient who presents with a recent history of pain and dysfunction affecting the right knee.  He reports a intermittent history of pain in the right knee for years.  Over the past 2 months, he has experienced much worse pain along with intermittent mechanical symptoms.  He reports catching when going up and down stairs and occasional giving out.  His pain is medial.  He tells me that he has been limping as result of the pain.  He was seen in the emergency room and given a brace.  The brace does not seem to help significantly.  Pain is severe, intermittent and stabbing.  He has noticed associated swelling.  Rest, ice and anti-inflammatories have helped somewhat.     Allergies:         Allergies   Allergen Reactions   • Clarithromycin Other (See Comments)       Unable to eat    • Sulfa Antibiotics Myalgia         Medications:   Home Medications:     Current Outpatient Medications:   •  amphetamine-dextroamphetamine (ADDERALL) 20 MG tablet, Take 20 mg by mouth Daily. Take 2 tablets by mouth every morning and 1 tablet by mouth at noon, Disp: , Rfl:   •  azelastine (ASTELIN) 0.1 % nasal spray, 2 sprays into each nostril 2 (Two) Times a Day. Use in each nostril as directed, Disp: , Rfl:   •  fluticasone (FLONASE) 50 MCG/ACT nasal spray, into each nostril daily., Disp: , Rfl:   •  ibuprofen (ADVIL,MOTRIN) 800 MG tablet, Take 800 mg by mouth., Disp: , Rfl:   •  NEXIUM 40 MG capsule, TK ONE C PO  QD, Disp: , Rfl: 5  •  clindamycin (CLEOCIN) 300 MG capsule, Take 1 capsule by mouth 4 (Four) Times a Day., Disp: 40 capsule, Rfl: 0  •  HYDROcodone-acetaminophen (NORCO) 7.5-325 MG per tablet, Take 1-2 tablets by mouth Every 8 (Eight) Hours As Needed for Moderate Pain  for up to 15 doses., Disp: 15 tablet, Rfl: 0  •  oxyCODONE-acetaminophen  (PERCOCET) 5-325 MG per tablet, Take 1 tablet by mouth Every 4 (Four) Hours As Needed for Moderate Pain ., Disp: 15 tablet, Rfl: 0  •  tamsulosin (FLOMAX) 0.4 MG capsule 24 hr capsule, TAKE 1 CAPSULE BY MOUTH EVERY DAY, Disp: 90 capsule, Rfl: 1     Medical History        Past Medical History:   Diagnosis Date   • Allergic     • Arthritis     • ED (erectile dysfunction)     • Hypertension     • Kidney stone     • Narcolepsy     • Sleep apnea              Surgical History         Past Surgical History:   Procedure Laterality Date   • ANAL FISTULOTOMY       • APPENDECTOMY       • COLONOSCOPY       • NECK SURGERY       • WISDOM TOOTH EXTRACTION                Social History            Occupational History   • Not on file   Tobacco Use   • Smoking status: Never Smoker   • Smokeless tobacco: Never Used   Substance and Sexual Activity   • Alcohol use: Yes       Comment: social   • Drug use: No   • Sexual activity: Defer      Social History          Social History Narrative   • Not on file               Family History   Problem Relation Age of Onset   • Diabetes Mother     • Thyroid disease Mother     • Hypertension Mother     • Diabetes Father     • Heart disease Father     • Hypertension Father     • Atrial fibrillation Father     • Cancer Father     • Stroke Maternal Grandfather     • Stroke Paternal Grandfather           Review of Systems:      Constitutional: Denies fever, shaking or chills   Eyes: Denies change in visual acuity   HEENT: Denies nasal congestion or sore throat   Respiratory: Denies cough or shortness of breath   Cardiovascular: Denies chest pain or edema  Endocrine: Denies tremors, palpitations, intolerance of heat or cold, polyuria, polydipsia.  GI: Denies abdominal pain, nausea, vomiting, bloody stools or diarrhea  : Denies frequency, urgency, incontinence, retention, or nocturia.  Musculoskeletal: Denies numbness tingling or loss of motor function except as above  Integument: Denies rash, lesion or  "ulceration   Neurologic: Denies headache or focal weakness, deficits  Heme: Denies epistaxis, spontaneous or excessive bleeding, epistaxis, hematuria, melena, fatigue, enlarged or tender lymph nodes.      All other pertinent positives and negatives as noted above in HPI.     Physical Exam: 63 y.o. male  Vitals       Vitals:     01/11/19 1401   Temp: 98.3 °F (36.8 °C)   TempSrc: Temporal   Weight: 131 kg (288 lb 6.4 oz)   Height: 182.9 cm (72\")         General:  Patient is awake and alert.  Appears in no acute distress or discomfort.     Psych:  Affect and demeanor are appropriate.     Eyes:  Conjunctiva and sclera appear grossly normal.  Eyes track well and EOM seem to be intact.     Ears:  No gross abnormalities.  Hearing adequate for the exam.     Cardiovascular:  Regular rate and rhythm.     Lungs:  Good chest expansion.  Breathing unlabored.     Spine:  Back appears grossly normal.  No palpable masses or adenopathy.  Good motion.  Straight leg raise and crossed straight leg raise manuever are both negative for lower leg and/or knee pain.     Right Lower Extremity:  He had a knee immobilizer in place which was removed.  Skin is benign.  No obvious gross abnormalities about the knee.  Trace effusion.  No palpable masses or adenopathy.  Moderate to severe tenderness noted over medial joint line.  Motion is to 125° of flexion, full extension.  Markedly positive medial Yoni's for pain.  No instability.  Strength is well preserved including hip flexion, knee extension, ankle and toe plantarflexion, ankle inversion and eversion.  Good sensory function throughout the leg and foot.  Palpable pulses.  Gait is mildly antalgic.         Radiology: Bilateral standing AP views, bilateral merchants views, and a lateral view of the right knee are ordered by myself and reviewed to evaluate the patient's complaint.  No comparison films are immediately available.  The x-rays show mild medial joint space narrowing.  There is " incidental note of enthesophyte formation off of the superior pole of the patella.  No lesions, masses, profound degenerative changes or other concerning findings.     Assessment/Plan:  Right knee pain, possible degenerative medial meniscal tear     We discussed options for him.  He is having trouble bearing weight and reports intermittent catching and popping.  Given his symptomatology and exam, I have recommended referral for an MRI to evaluate for a potentially unstable meniscal tear.  I told him that I will call him with the results and we will come up with a plan pending review of that study.  I recommend that he discontinue use of the knee immobilizer and start working on range of motion as tolerated.     Dwaine Connolly MD    Addendum:  His MRI did confirm a complex medial meniscal tear.  His eschar from last week has healed.  We will plan on proceeding with a right knee arthroscopy and partial meniscectomy including possible chondroplasty and addressing any unforseen pathology as indicated.  I reviewed details of procedure with patient today and discussed all the risks, benefits, alternatives, and limitations of the procedure in laymen's terms with the risks including but not limited to:  neurovascular damage, bleeding, infection, hematoma, chronic pain, worsening of pain, chondrolysis, swelling, loss of motion, weakness, stiffness, instability, DVT, pulmonary embolus, death, stroke, complex regional pain syndrome, and need for additional procedures.  The patient verbalized understanding, and was given the opportunity to ask and have all questions answered today.  No guarantees were given regarding results of surgery.      Dwaine Connolly MD

## 2019-02-12 NOTE — ANESTHESIA POSTPROCEDURE EVALUATION
Patient: Jose Ramon Murcia    Procedure Summary     Date:  02/12/19 Room / Location:   [facility]  [facility]    Anesthesia Start:  1611 Anesthesia Stop:  1705    Procedure:  Knee Arthroscopy with PARTICIAL MEDIAL Menisectomy (Right Knee) Diagnosis:       Acute medial meniscus tear of right knee, subsequent encounter      (Acute medial meniscus tear of right knee, subsequent encounter [S83.341D])    Surgeon:  Dwaine Connolly MD Provider:  Samy Burgos MD    Anesthesia Type:  general ASA Status:  3          Anesthesia Type: general  Last vitals  BP   133/74 (02/12/19 1734)   Temp   36.7 °C (98 °F) (02/12/19 1734)   Pulse   77 (02/12/19 1734)   Resp   15 (02/12/19 1734)     SpO2   96 % (02/12/19 1734)     Post Anesthesia Care and Evaluation    Patient location during evaluation: bedside  Patient participation: complete - patient participated  Level of consciousness: awake and alert  Pain management: adequate  Airway patency: patent  Anesthetic complications: No anesthetic complications    Cardiovascular status: acceptable  Respiratory status: acceptable  Hydration status: acceptable    Comments: /74 (BP Location: Left arm, Patient Position: Lying)   Pulse 77   Temp 36.7 °C (98 °F)   Resp 15   SpO2 96%     Pt seen in PACU prior to DC

## 2019-02-12 NOTE — ANESTHESIA PROCEDURE NOTES
Airway  Urgency: elective    Date/Time: 2/12/2019 4:19 PM  Airway not difficult    General Information and Staff    Patient location during procedure: OR  Anesthesiologist: Samy Burgos MD    Indications and Patient Condition  Indications for airway management: airway protection    Preoxygenated: yes  Mask difficulty assessment: 1 - vent by mask    Final Airway Details  Final airway type: supraglottic airway      Successful airway: classic  Size 5    Number of attempts at approach: 1    Additional Comments  Pre oxygenated  Easy mask vent  Atraumatic lma placement  +etco2

## 2019-02-12 NOTE — ANESTHESIA PREPROCEDURE EVALUATION
Anesthesia Evaluation     history of anesthetic complications: PONV  NPO Solid Status: > 8 hours             Airway   Mallampati: III  TM distance: >3 FB  Neck ROM: limited  Possible difficult intubation  Dental - normal exam     Pulmonary - normal exam   (+) sleep apnea,   Cardiovascular     ECG reviewed  Rhythm: regular    (+) hypertension, hyperlipidemia,   (-) murmur      Neuro/Psych  (+) numbness, psychiatric history,     GI/Hepatic/Renal/Endo    (+) morbid obesity, GERD,      Musculoskeletal     (+) neck pain,   Abdominal  - normal exam   Substance History      OB/GYN          Other                        Anesthesia Plan    ASA 3     general     intravenous induction

## 2019-02-12 NOTE — OP NOTE
Orthopaedic Operative Note    Facility: [facility]    Patient: Jose Ramon Murcia    Medical Record Number: [id number]    YOB: 1955    Dictating Surgeon: Dwaine Connolly M.D.*    Primary Care Physician: Vasyl Urena MD    Date of Operation: 2/12/2019    Pre-Operative Diagnosis:  Right knee medial meniscal tear    Post-Operative Diagnosis:  Right knee medial meniscal tear     Procedure Performed:  Right knee arthroscopy with partial medial meniscectomy    Surgeon: Dwaine Connolly MD     Assistant: JENNIFER Garcia    Anesthesia: Gen. followed by local anesthesia    Complications: None.     Estimated Blood Loss: Less than 50 mL.     Specimens: * No orders in the log *    Implants: None    Brief Operative Indication:  The patient had a history of right knee pain and intermittent mechanical symptoms consistent with a meniscal tear.  The pre-operative MRI also showed a meniscal tear consistent with the clinical history.  The risks, benefits, and alternatives to a partial meniscectomy were discussed in detail.  He acknowledged understanding of the information and consented to proceed.    Description of the procedure in detail:  The patient and operative site were identified in the preoperative holding area.  The surgical site was marked.  The patient was then taken to the operating room and placed in the supine position.  Adequate general anesthesia was administered.  The patient was repositioned on the operating table.    A timeout was taken and preoperative antibiotics administered.  All bony prominences were carefully padded and protected.  The right lower extremity was prepped and draped in the standard sterile fashion.  I cleaned the extremity with an alcohol solution.  A Hibiclens scrub was performed and then the extremity was prepped with 2 ChloraPrep preps.  I allowed those to dry for 3 minutes before the draping procedure was carried out.    The extremity was  exsanguinated with an Esmarch bandage and the tourniquet insufflated to 250 mmHg.  I began the procedure itself by fashioning a standard anterolateral portal.  The scope was inserted into the joint.  At this point, a diagnostic arthroscopy was performed.    The patellofemoral compartment demonstrated mild to moderate arthrosis.  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  No loose bodies or chondral flap tears were noted.  The medial and lateral gutters were inspected and confirmed to be free of loose bodies or displaced meniscal fragments.    I then entered the medial compartment.  A standard anteromedial portal was established using needle localization technique.  A probe was inserted.  He had a complex tear of the posterior horn and mid body of the meniscus.  There is an unstable flap of tissue which was loose.  This extended into a complex horizontal cleavage tear of the posterior horn.  A combination of a shaver and upbiter were utilized to remove the unstable portions of the meniscus and contour that back to a stable rim.  Once I completed the partial meniscectomy, the remainder of the meniscus was confirmed to be stable and intact.  The articular cartilage of the medial compartment appeared in fairly good condition.  There was low-grade chondromalacia but no high-grade chondromalacia or full-thickness chondral defects.  I would grade the chondromalacia as grade 2 and I would say that it involved approximately 15-20% of the overall articular surface.    I then directed my attention to the notch.  The ACL and PCL were intact.  The ACL was stable when probed.    The leg was placed in a figure 4 position and the lateral compartment entered.  The articular cartilage of the compartment was well-preserved.  The lateral meniscus was intact and stable when probed.  No pathology was noted  in this compartment.    At this point, final images were taken and saved.  Then directed my attention to closure.  I made one final pass through all 3 compartments as well as the medial and lateral gutters to make sure that there were no loose meniscal fragments.  None were identified.  The instruments were withdrawn.  The wounds were copiously irrigated out with sterile saline and then closed in a layered fashion.  Both wounds were infiltrated with local anesthetic.  Sterile dressings were applied.  The tourniquet was deflated.  The patient was awakened and taken to the recovery room in good condition.    Dwaine Connolly MD  02/12/19

## 2019-02-12 NOTE — BRIEF OP NOTE
KNEE ARTHROSCOPY  Progress Note    Jose Ramon Murcia  2/12/2019    Pre-op Diagnosis:   Acute medial meniscus tear of right knee, subsequent encounter [S83.241D]       Post-Op Diagnosis Codes:     * Acute medial meniscus tear of right knee, subsequent encounter [S83.241D]    Procedure/CPT® Codes:      Procedure(s):  Knee Arthroscopy with PARTICIAL MEDIAL Menisectomy    Surgeon(s):  Dwaine Connolly MD    Anesthesia: General    Staff:   Circulator: Nora Grant RN  Scrub Person: Gildardo De La Cruz Toni M  Assistant: Lorna Metcalf APRN    Estimated Blood Loss: minimal    Urine Voided: * No values recorded between 2/12/2019  4:11 PM and 2/12/2019  5:00 PM *    Specimens:                None      Drains:      Findings: see dictation    Complications: none      Dwaine Connolly MD     Date: 2/12/2019  Time: 5:06 PM

## 2019-02-13 ENCOUNTER — TELEPHONE (OUTPATIENT)
Dept: ORTHOPEDIC SURGERY | Facility: CLINIC | Age: 64
End: 2019-02-13

## 2019-02-13 NOTE — TELEPHONE ENCOUNTER
Patient is scheduled for a post op appt on 2/20/19 with Lorna at EP office. Patient doing fine but bandage did have small amount of bleeding on it that has stopped and also the bandage slipped down. I transferred his call to [facility] for clinical advice.

## 2019-02-20 ENCOUNTER — OFFICE VISIT (OUTPATIENT)
Dept: ORTHOPEDIC SURGERY | Facility: CLINIC | Age: 64
End: 2019-02-20

## 2019-02-20 VITALS — HEIGHT: 72 IN | TEMPERATURE: 98.6 F | WEIGHT: 289 LBS | BODY MASS INDEX: 39.14 KG/M2

## 2019-02-20 DIAGNOSIS — Z98.890 S/P ARTHROSCOPIC KNEE SURGERY: Primary | ICD-10-CM

## 2019-02-20 PROCEDURE — 99024 POSTOP FOLLOW-UP VISIT: CPT | Performed by: NURSE PRACTITIONER

## 2019-02-20 NOTE — PROGRESS NOTES
Jose Ramon Murcia : 1955 MRN: [id number] DATE: 2019    CC: 1 week s/p right  knee scope     Vitals:    19 1405   Temp: 98.6 °F (37 °C)       HPI:  Pt. returns to clinic today for follow up.  Denies any wound problems including redness, drainage.  No fevers, chills, sweats.  Mobilizing well using quad cane.  NO complaints.    Exam:  Wounds appear well-approximated without any erythema or drainage.  Calf soft.  Negative Chad's sign.  Good motor and sensory function in foot.  Good pedal pulses.  Gait mildly antalgic but otherwise reciprocal heel-toe.    Imaging   none    Impression:  1 week s/p right knee scope with partial medial meniscectomy    Plan:    1.  Sutures removed and replaced with steri-strips.  2.  Continue to progress activity gradually as tolerated.  3.  Continue to ice knee as needed, especially after activity.  4.  Follow up in 1 month.    JENNIFER Lewis     2019

## 2019-02-22 ENCOUNTER — OFFICE VISIT (OUTPATIENT)
Dept: FAMILY MEDICINE CLINIC | Facility: CLINIC | Age: 64
End: 2019-02-22

## 2019-02-22 VITALS
OXYGEN SATURATION: 98 % | SYSTOLIC BLOOD PRESSURE: 136 MMHG | DIASTOLIC BLOOD PRESSURE: 84 MMHG | HEART RATE: 79 BPM | WEIGHT: 288.4 LBS | HEIGHT: 72 IN | RESPIRATION RATE: 16 BRPM | BODY MASS INDEX: 39.06 KG/M2 | TEMPERATURE: 98.4 F

## 2019-02-22 DIAGNOSIS — G47.33 OBSTRUCTIVE APNEA: ICD-10-CM

## 2019-02-22 DIAGNOSIS — I10 ESSENTIAL HYPERTENSION: ICD-10-CM

## 2019-02-22 DIAGNOSIS — E78.5 HYPERLIPIDEMIA, UNSPECIFIED HYPERLIPIDEMIA TYPE: Primary | ICD-10-CM

## 2019-02-22 PROCEDURE — 99214 OFFICE O/P EST MOD 30 MIN: CPT | Performed by: INTERNAL MEDICINE

## 2019-02-22 NOTE — PROGRESS NOTES
Subjective   Jose Ramon Murcia is a 63 y.o. male. Patient is here today for   Chief Complaint   Patient presents with   • Establish Care     np   • Knee Pain     pt states years knee surgery    • Shoulder Pain     pt states shoulder pain Dr. Hooker gets shots in it           Vitals:    02/22/19 0855   BP: 136/84   Pulse: 79   Resp: 16   Temp: 98.4 °F (36.9 °C)   SpO2: 98%       Past Medical History:   Diagnosis Date   • Abnormal EKG     IN PAST   • Allergic    • Arthritis    • ED (erectile dysfunction)    • GERD (gastroesophageal reflux disease)    • Hypertension     IN PAST  NO MEDS   • Kidney stone    • Narcolepsy    • PONV (postoperative nausea and vomiting)     1969 AND 1998   • Sleep apnea       Allergies   Allergen Reactions   • Clarithromycin Other (See Comments)     Unable to eat    • Sulfa Antibiotics Myalgia      Social History     Socioeconomic History   • Marital status:      Spouse name: Not on file   • Number of children: Not on file   • Years of education: Not on file   • Highest education level: Not on file   Social Needs   • Financial resource strain: Not on file   • Food insecurity - worry: Not on file   • Food insecurity - inability: Not on file   • Transportation needs - medical: Not on file   • Transportation needs - non-medical: Not on file   Occupational History   • Not on file   Tobacco Use   • Smoking status: Never Smoker   • Smokeless tobacco: Never Used   Substance and Sexual Activity   • Alcohol use: No     Frequency: Never     Comment: social   • Drug use: No   • Sexual activity: Defer   Other Topics Concern   • Not on file   Social History Narrative   • Not on file        Current Outpatient Medications:   •  amphetamine-dextroamphetamine (ADDERALL) 20 MG tablet, Take 20 mg by mouth Daily. Take 2 tablets by mouth every morning and 1 tablet by mouth at noon, Disp: , Rfl:   •  azelastine (ASTELIN) 0.1 % nasal spray, 1 spray into the nostril(s) as directed by provider Every  Night. Use in each nostril as directed , Disp: , Rfl:   •  esomeprazole (nexIUM) 40 MG capsule, Take 40 mg by mouth 2 (Two) Times a Day., Disp: , Rfl:   •  fluticasone (FLONASE) 50 MCG/ACT nasal spray, 2 sprays into the nostril(s) as directed by provider Every Night., Disp: , Rfl:   •  oxyCODONE-acetaminophen (PERCOCET) 7.5-325 MG per tablet, Take 1 tablet by mouth Every 4 (Four) Hours As Needed (Pain)., Disp: 42 tablet, Rfl: 0     Objective     This patient is a pleasant 63-year-old male.  He is an Muslim .  He also works for hospice in a  spiritual capacity    He has a past medical history significant for obstructive sleep apnea treated with BiPAP, narcolepsy, hypertension, hypercholesterolemia.    He had a recent surgery with Dr. Carreon regarding arthroscopic knee for a torn meniscus in his right knee.         Review of Systems   Constitutional: Negative.    HENT: Negative.    Respiratory: Negative.    Cardiovascular: Negative.    Musculoskeletal: Negative.    Psychiatric/Behavioral: Negative.        Physical Exam   Constitutional: He is oriented to person, place, and time. He appears well-developed and well-nourished.   Pleasant, neatly groomed, BMI 39.   HENT:   Head: Normocephalic and atraumatic.   Neck:   No carotid bruit, no thyromegaly.   Cardiovascular: Normal rate, regular rhythm and normal heart sounds. Exam reveals no friction rub.   No murmur heard.  Pulmonary/Chest: No stridor. No respiratory distress. He has no wheezes.   Neurological: He is alert and oriented to person, place, and time.   Psychiatric: He has a normal mood and affect. His behavior is normal.   Nursing note and vitals reviewed.        Problem List Items Addressed This Visit        Cardiovascular and Mediastinum    BP (high blood pressure)    Hyperlipidemia - Primary       Respiratory    Obstructive apnea            PLAN  His hypertension appears to be well controlled.    He is due for some fasting labs.  I asked him to  follow-up for a comprehensive physical examination in about 3-6 months.  About a week prior to that visit, he should have the following labs done: Lipid profile, comprehensive metabolic panel, CBC, PSA if it has not been done in a year, urinalysis.  No Follow-up on file.

## 2019-02-28 ENCOUNTER — TELEPHONE (OUTPATIENT)
Dept: ORTHOPEDIC SURGERY | Facility: CLINIC | Age: 64
End: 2019-02-28

## 2019-02-28 DIAGNOSIS — Z98.890 S/P KNEE SURGERY: Primary | ICD-10-CM

## 2019-02-28 NOTE — TELEPHONE ENCOUNTER
Patient says Lorna told him to call back if he wanted to get some physical therapy on his right knee. He would like to go to PT at [facility].

## 2019-03-04 ENCOUNTER — HOSPITAL ENCOUNTER (OUTPATIENT)
Dept: PHYSICAL THERAPY | Facility: HOSPITAL | Age: 64
Setting detail: THERAPIES SERIES
Discharge: HOME | End: 2019-03-04

## 2019-03-04 DIAGNOSIS — Z98.890 S/P ARTHROSCOPIC SURGERY OF RIGHT KNEE: Primary | ICD-10-CM

## 2019-03-04 DIAGNOSIS — Z74.09 IMPAIRED MOBILITY: ICD-10-CM

## 2019-03-04 DIAGNOSIS — Z47.89 ORTHOPEDIC AFTERCARE: ICD-10-CM

## 2019-03-04 PROCEDURE — 97110 THERAPEUTIC EXERCISES: CPT | Performed by: PHYSICAL THERAPIST

## 2019-03-04 PROCEDURE — 97161 PT EVAL LOW COMPLEX 20 MIN: CPT | Performed by: PHYSICAL THERAPIST

## 2019-03-04 NOTE — THERAPY EVALUATION
Outpatient Physical Therapy Ortho Initial Evaluation  [facility]     Patient Name: Jose Ramon Upton  : 1955  MRN: [id number]  Today's Date: 3/4/2019      Visit Date: 2019    Patient Active Problem List   Diagnosis   • Allergic rhinitis   • Cervical spinal stenosis   • ED (erectile dysfunction) of non-organic origin   • BP (high blood pressure)   • Lumbar radiculopathy   • Neoplasm of skin   • Obstructive apnea   • Burning or prickling sensation   • Neck pain   • Benign non-nodular prostatic hyperplasia with lower urinary tract symptoms   • Shoulder pain, acute   • Urinary frequency   • Hyperlipidemia   • Chronic pain of right knee   • Rotator cuff tendonitis   • Acute medial meniscus tear of right knee        Past Medical History:   Diagnosis Date   • Abnormal EKG     IN PAST   • Allergic    • Arthritis    • ED (erectile dysfunction)    • GERD (gastroesophageal reflux disease)    • Hypertension     IN PAST  NO MEDS   • Kidney stone    • Narcolepsy    • PONV (postoperative nausea and vomiting)      AND    • Sleep apnea         Past Surgical History:   Procedure Laterality Date   • ANAL FISTULOTOMY     • APPENDECTOMY     • BACK SURGERY      CERVICAL   • COLONOSCOPY     • KNEE ARTHROSCOPY Right 2019    Procedure: Knee Arthroscopy with PARTICIAL MEDIAL Menisectomy;  Surgeon: Dwaine Connolly MD;  Location: [facility] [place];  Service: Orthopedics   • NECK SURGERY     • WISDOM TOOTH EXTRACTION         Visit Dx:     ICD-10-CM ICD-9-CM   1. S/P arthroscopic surgery of right knee Z98.890 V45.89   2. Impaired mobility Z74.09 799.89   3. Orthopedic aftercare Z47.89 V54.9       Patient History     Row Name 19 1200             History    Chief Complaint  Difficulty Walking;Difficulty with daily activities;Joint swelling;Pain  -GJ      Type of Pain  Knee pain  -GJ      Date Current Problem(s) Began  -- chronic  -GJ      Brief Description of Current Complaint  Mr. Upton is a 62 y/o  male.  He is 3 weeks s/p R knee menisectomy (2/12/19).  He reported 8+ years of R knee pain, on and off prior to surgery. 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  Overall he reports good resolution of pain.  He denies buckling/instances of instability.   -GJ      Previous treatment for THIS PROBLEM  Surgery  -GJ      Surgery Date:  02/12/19  -GJ      Patient/Caregiver Goals  Relieve pain;Return to prior level of function;Return to work;Improve mobility;Improve strength;Know what to do to help the symptoms  -GJ      Occupation/sports/leisure activities  prabhakar, fishing, pool  -GJ         Pain     Pain Location  Knee  -GJ      Pain at Present  1  -GJ      Pain at Best  0  -GJ      Pain at Worst  4  -GJ         Daily Activities    Primary Language  English  -GJ      How does patient learn best?  Listening;Reading  -GJ      Teaching needs identified  Home Exercise Program;Management of Condition  -GJ      Barriers to learning  None  -GJ      Pt Participated in POC and Goals  Yes  -GJ         Safety    Are you being hurt, hit, or frightened by anyone at home or in your life?  No  -GJ      Are you being neglected by a caregiver  No  -GJ        User Key  (r) = Recorded By, (t) = Taken By, (c) = Cosigned By    Initials Name Provider Type    GJ Cristi Morataya, PT Physical Therapist          PT Ortho     Row Name 03/04/19 1400       Quarter Clearing    Quarter Clearing  Lower Quarter Clearing  -GJ       Myotomal Screen- Lower Quarter Clearing    Hip flexion (L2)  Bilateral:;4+ (Good +)  -GJ    Ankle DF (L4)  Bilateral:;4+ (Good +)  -GJ    Ankle PF (S1)  Bilateral:;4 (Good)  -GJ       Special Tests/Palpation    Special Tests/Palpation  Knee  -GJ       Knee Palpation    Knee Palpation?  Yes  -GJ    Medial Joint Line  -- no TTP  -GJ    Lateral Joint Line  -- no TTP  -GJ       Knee Special Tests    Chad’s sign (DVT)  Bilateral:;Negative  -GJ        General ROM    RT Lower Ext  Rt Knee Extension/Flexion  -GJ    LT Lower Ext  Lt Knee Extension/Flexion  -GJ       Right Lower Ext    Rt Knee Extension/Flexion AROM    -GJ    Rt Knee Extension/Flexion PROM  5 (short of full neutral)  -GJ       Left Lower Ext    Lt Knee Extension/Flexion AROM  0-115  -GJ       MMT (Manual Muscle Testing)    Rt Lower Ext  Rt Hip ABduction;Rt Knee Extension;Rt Knee Flexion  -GJ    Lt Lower Ext  Lt Hip ABduction;Lt Knee Extension;Lt Knee Flexion  -GJ       MMT Right Lower Ext    Rt Hip ABduction MMT, Gross Movement  (4+/5) good plus  -GJ    Rt Knee Extension MMT, Gross Movement  (4/5) good within available range  -GJ    Rt Knee Flexion MMT, Gross Movement  (4/5) good  -GJ       MMT Left Lower Ext    Lt Hip ABduction MMT, Gross Movement  (4+/5) good plus  -GJ    Lt Knee Extension MMT, Gross Movement  (5/5) normal  -GJ    Lt Knee Flexion MMT, Gross Movement  (5/5) normal  -GJ       Flexibility    Flexibility Tested?  Lower Extremity  -GJ       Lower Extremity Flexibility    Hamstrings  Bilateral:;Moderately limited  -GJ    Hip Flexors  Bilateral:;Moderately limited  -GJ    Quadriceps  Bilateral:;Moderately limited  -GJ    Hip External Rotators  Bilateral:;Moderately limited  -GJ    Hip Internal Rotators  Bilateral:;Moderately limited  -GJ      User Key  (r) = Recorded By, (t) = Taken By, (c) = Cosigned By    Initials Name Provider Type    Cristi Murphy, PT Physical Therapist                      Therapy Education  Education Details: discussed dx, px, poc, discussed anatomy of the knee and physiology of healing, discussed realistic expectations and time frames for therapy. Encouraged ice BID.    Given: HEP, Symptoms/condition management, Pain management, Mobility training, Posture/body mechanics, Edema management  Program: New  How Provided: Demonstration, Written, Verbal  Provided to: Patient  Level of Understanding: Teach back education performed, Verbalized, Demonstrated      PT OP Goals     Row Name 03/04/19 1200          PT Short Term Goals    STG Date to Achieve  04/05/19  -GJ     STG 1  pt. to be I with initial HEP to facilitate self management of their condition  -GJ     STG 1 Progress  New  -GJ     STG 2  pt. to be educated in/verbalize understanding of the importance of posture/ergonomics in association with their condition to facilitate self management of their condition  -GJ     STG 2 Progress  New  -GJ     STG 3  pt to report driving greater then 30-45 min. without exacerbatin of R knee pain to facilate ease of return to work activities  -GJ     STG 3 Progress  New  -GJ        Long Term Goals    LTG Date to Achieve  05/03/19  -GJ     LTG 1  pt. to be I with advanced HEP to facilitate self management of their condition  -GJ     LTG 1 Progress  New  -GJ     LTG 2  pt. to report a KOS >/= 75% to demonstrate decreased level of perceived disability  -GJ     LTG 2 Progress  New  -GJ     LTG 3  pt to demonstrate near normal heel to toe gait pattern to facilitate ease/safety with community mobility  -GJ     LTG 3 Progress  New  -GJ     LTG 4  pt. to ascend/descends stairs with reciprocal pattern (</= 1 rails) to facilitate ease/safety of household/community mobility  -GJ     LTG 4 Progress  New  -GJ        Time Calculation    PT Goal Re-Cert Due Date  05/03/19  -GJ       User Key  (r) = Recorded By, (t) = Taken By, (c) = Cosigned By    Initials Name Provider Type    GJ Cristi Morataya, PT Physical Therapist          PT Assessment/Plan     Row Name 03/04/19 1259          PT Assessment    Functional Limitations  Limitations in functional capacity and performance;Impaired locomotion;Impaired gait;Limitations in community activities;Performance in leisure activities;Limitation in home management;Performance in work activities  -GJ     Impairments  Range of motion;Pain;Joint mobility;Impaired muscle endurance;Gait;Muscle strength;Edema;Impaired flexibility;Impaired muscle length  -GJ      Assessment Comments  Mr. Upton is a 62 y/o male.  He is 3 weeks s/p R knee menisectomy (2/12/19).  He reported 8+ years of R knee pain, on and off prior to surgery. 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  Overall he reports good resolution of pain.  He denies buckling/instances of instability.  Mr. Upton presents to the clinic with mild limp, no AD.  He demonstrates R knee AROM .  He demonstrates global LE weakness/decreased quality of R quad contraction.  He reports KOS score of 61%, scored 0-100, 100% represents on perceived disability.  Mr. Upton demonstrates evolving s/s consistent following above procedure which limit his ability to participate in work activities, household, and community mobility.  Aggravating/personal factors affecting recovery include job duties.  He will benefit from skilled physical therapy intervention to address the above impairments.    -GJ     Please refer to paper survey for additional self-reported information  Yes  -GJ     Rehab Potential  Excellent  -GJ     Patient would benefit from skilled therapy intervention  Yes  -GJ        PT Plan    Predicted Duration of Therapy Intervention (Therapy Eval)  4 weeks   -GJ     Planned CPT's?  PT EVAL LOW COMPLEXITY: 18368;PT RE-EVAL: 78849;PT THER ACT EA 15 MIN: 17393;PT MANUAL THERAPY EA 15 MIN: 73984;PT NEUROMUSC RE-EDUCATION EA 15 MIN: 50042;PT GAIT TRAINING EA 15 MIN: 85887;PT HOT OR COLD PACK TREAT MCARE;PT ELECTRICAL STIM UNATTEND: ;PT ULTRASOUND EA 15 MIN: 08621  -GJ     PT Plan Comments  warm up on Nustep, strengthening for R knee/hip/lower quarter, work on quad quality, gait mechanics, add weight to hs curls/LAQ  -GJ       User Key  (r) = Recorded By, (t) = Taken By, (c) = Cosigned By    Initials Name Provider Type    Cristi Murphy, PT Physical Therapist            Exercises     Row Name 03/04/19 1457 03/04/19 1100           Total Minutes    67069 - PT Therapeutic Exercise Minutes  18  -GJ  --        Exercise 1    Exercise Name 1  --  QS  -GJ     Cueing 1  --  Verbal;Demo  -GJ     Reps 1  --  15  -GJ     Time 1  --  5 s  -GJ        Exercise 2    Exercise Name 2  --  LAQ  -GJ     Cueing 2  --  Verbal;Demo  -GJ     Reps 2  --  15  -GJ     Additional Comments  --  0#  -GJ        Exercise 3    Exercise Name 3  --  HR  -GJ     Cueing 3  --  Verbal;Demo  -GJ     Reps 3  --  15  -GJ        Exercise 4    Exercise Name 4  --  standing HS curl,    -GJ     Cueing 4  --  Verbal;Demo  -GJ     Time 4  --  15  -GJ     Additional Comments  --  0#  -GJ        Exercise 5    Exercise Name 5  --  seated TKE into ball  -GJ     Cueing 5  --  Verbal;Demo  -GJ     Reps 5  --  15  -GJ     Time 5  --  5 s  -GJ        Exercise 6    Exercise Name 6  --  HS stretch at step  -GJ     Cueing 6  --  Verbal;Demo  -GJ     Reps 6  --  3  -GJ     Time 6  --  20 s  -GJ        Exercise 7    Exercise Name 7  --  gastroc stretch at step  -GJ     Cueing 7  --  Verbal;Demo  -GJ     Reps 7  --  3  -GJ     Time 7  --  20 s  -GJ       User Key  (r) = Recorded By, (t) = Taken By, (c) = Cosigned By    Initials Name Provider Type    Cristi Murphy, PT Physical Therapist                        Outcome Measure Options: Knee Outcome Score- ADL  Knee Outcome Score  Knee Outcome Score Comments: 61%      Time Calculation:     Therapy Suggested Charges     Code   Minutes Charges    70692 (CPT®) Hc Pt Neuromusc Re Education Ea 15 Min      08967 (CPT®) Hc Pt Ther Proc Ea 15 Min 18 1    01133 (CPT®) Hc Gait Training Ea 15 Min      24875 (CPT®) Hc Pt Therapeutic Act Ea 15 Min      00462 (CPT®) Hc Pt Manual Therapy Ea 15 Min      72759 (CPT®) Hc Pt Ther Massage- Per 15 Min      54678 (CPT®) Hc Pt Iontophoresis Ea 15 Min      50430 (CPT®) Hc Pt Elec Stim Ea-Per 15 Min      89425 (CPT®) Hc Pt Ultrasound Ea 15 Min      08157 (CPT®) Hc Pt Self Care/Mgmt/Train Ea 15 Min      69340 (CPT®) Hc  Pt Prosthetic (S) Train Initial Encounter, Each 15 Min      15745 (CPT®) Hc Orthotic(S) Mgmt/Train Initial Encounter, Each 15min      05297 (CPT®) Hc Pt Aquatic Therapy Ea 15 Min      02013 (CPT®) Hc Pt Orthotic(S)/Prosthetic(S) Encounter, Each 15 Min       (CPT®) Hc Pt Electrical Stim Unattended      Total  18 1          Start Time: 1000  Stop Time: 1045  Time Calculation (min): 45 min     Therapy Charges for Today     Code Description Service Date Service Provider Modifiers Qty    03686416290 HC PT THER PROC EA 15 MIN 3/4/2019 Cristi Morataya, PT GP 1    19659115372 HC PT EVAL LOW COMPLEXITY 2 3/4/2019 Cristi Morataya, PT GP 1          PT G-Codes  Outcome Measure Options: Knee Outcome Score- ADL         Cristi Morataya, PT  3/4/2019

## 2019-03-08 ENCOUNTER — HOSPITAL ENCOUNTER (OUTPATIENT)
Dept: PHYSICAL THERAPY | Facility: HOSPITAL | Age: 64
Setting detail: THERAPIES SERIES
Discharge: HOME | End: 2019-03-08

## 2019-03-08 ENCOUNTER — TELEPHONE (OUTPATIENT)
Dept: ORTHOPEDIC SURGERY | Facility: CLINIC | Age: 64
End: 2019-03-08

## 2019-03-08 DIAGNOSIS — M25.562 CHRONIC PAIN OF BOTH KNEES: ICD-10-CM

## 2019-03-08 DIAGNOSIS — G89.29 CHRONIC PAIN OF BOTH KNEES: ICD-10-CM

## 2019-03-08 DIAGNOSIS — Z98.890 S/P ARTHROSCOPIC SURGERY OF RIGHT KNEE: Primary | ICD-10-CM

## 2019-03-08 DIAGNOSIS — Z47.89 ORTHOPEDIC AFTERCARE: ICD-10-CM

## 2019-03-08 DIAGNOSIS — M25.561 CHRONIC PAIN OF BOTH KNEES: ICD-10-CM

## 2019-03-08 DIAGNOSIS — Z74.09 IMPAIRED MOBILITY: ICD-10-CM

## 2019-03-08 PROCEDURE — 97110 THERAPEUTIC EXERCISES: CPT

## 2019-03-08 NOTE — THERAPY TREATMENT NOTE
Outpatient Physical Therapy Ortho Treatment Note  [facility]     Patient Name: Jose Ramon Murcia  : 1955  MRN: [id number]  Today's Date: 3/8/2019      Visit Date: 2019    Visit Dx:    ICD-10-CM ICD-9-CM   1. S/P arthroscopic surgery of right knee Z98.890 V45.89   2. Impaired mobility Z74.09 799.89   3. Orthopedic aftercare Z47.89 V54.9   4. Chronic pain of both knees M25.561 719.46    M25.562 338.29    G89.29        Patient Active Problem List   Diagnosis   • Allergic rhinitis   • Cervical spinal stenosis   • ED (erectile dysfunction) of non-organic origin   • BP (high blood pressure)   • Lumbar radiculopathy   • Neoplasm of skin   • Obstructive apnea   • Burning or prickling sensation   • Neck pain   • Benign non-nodular prostatic hyperplasia with lower urinary tract symptoms   • Shoulder pain, acute   • Urinary frequency   • Hyperlipidemia   • Chronic pain of right knee   • Rotator cuff tendonitis   • Acute medial meniscus tear of right knee        Past Medical History:   Diagnosis Date   • Abnormal EKG     IN PAST   • Allergic    • Arthritis    • ED (erectile dysfunction)    • GERD (gastroesophageal reflux disease)    • Hypertension     IN PAST  NO MEDS   • Kidney stone    • Narcolepsy    • PONV (postoperative nausea and vomiting)      AND    • Sleep apnea         Past Surgical History:   Procedure Laterality Date   • ANAL FISTULOTOMY     • APPENDECTOMY     • BACK SURGERY      CERVICAL   • COLONOSCOPY     • KNEE ARTHROSCOPY Right 2019    Procedure: Knee Arthroscopy with PARTICIAL MEDIAL Menisectomy;  Surgeon: Dwaine Connolly MD;  Location: [facility] OR [facility] [place];  Service: Orthopedics   • NECK SURGERY     • WISDOM TOOTH EXTRACTION                         PT Assessment/Plan     Row Name 19 0854          PT Assessment    Assessment Comments  Added weight to open chain exercises. Pain under control. Ambulating with normaal gait and no AD. Continue strengthening  -WS       User Key   (r) = Recorded By, (t) = Taken By, (c) = Cosigned By    Initials Name Provider Type    Jose G Helms PTA Physical Therapy Assistant          Modalities     Row Name 03/08/19 0820             Ice    Patient denies application of Ice  Yes  -WS      Patient reports will apply ice at home to involved area  Yes  -WS        User Key  (r) = Recorded By, (t) = Taken By, (c) = Cosigned By    Initials Name Provider Type    Jose G Helms PTA Physical Therapy Assistant          Exercises     Row Name 03/08/19 0820             Subjective Comments    Subjective Comments  No pain today. Yesterday didn't do exercises because of increased pain  -WS         Subjective Pain    Able to rate subjective pain?  yes  -WS      Pre-Treatment Pain Level  0  -WS         Total Minutes    62281 - PT Therapeutic Exercise Minutes  30  -WS         Exercise 1    Exercise Name 1  QS  -WS      Cueing 1  Verbal;Demo  -WS      Reps 1  15  -WS      Time 1  5 s  -WS         Exercise 2    Exercise Name 2  LAQ  -WS      Cueing 2  Verbal;Demo  -WS      Reps 2  15  -WS      Additional Comments  3#  -WS         Exercise 3    Exercise Name 3  HR  -WS      Cueing 3  Verbal;Demo  -WS      Reps 3  15  -WS         Exercise 4    Exercise Name 4  standing HS curl,    -WS      Cueing 4  Verbal;Demo  -WS      Time 4  15  -WS      Additional Comments  2#  -WS         Exercise 5    Exercise Name 5  seated TKE into ball  -WS      Cueing 5  Verbal;Demo  -WS      Reps 5  15  -WS      Time 5  5 s  -WS         Exercise 6    Exercise Name 6  HS stretch at step  -WS      Cueing 6  Verbal;Demo  -WS      Reps 6  3  -WS      Time 6  20 s  -WS         Exercise 7    Exercise Name 7  gastroc stretch at step  -WS      Cueing 7  Verbal;Demo  -WS      Reps 7  3  -WS      Time 7  20 s  -WS         Exercise 8    Exercise Name 8  Nustep LE L5  -WS      Time 8  5 min  -WS         Exercise 9    Exercise Name 9  standing hip abd  -WS      Reps 9  15  -WS      Additional  Comments  2#  -        User Key  (r) = Recorded By, (t) = Taken By, (c) = Cosigned By    Initials Name Provider Type    Jose G Helms PTA Physical Therapy Assistant                         PT OP Goals     Row Name 03/08/19 0800          PT Short Term Goals    STG Date to Achieve  04/05/19  -WS     STG 1  pt. to be I with initial HEP to facilitate self management of their condition  -WS     STG 1 Progress  Met  -WS     STG 2  pt. to be educated in/verbalize understanding of the importance of posture/ergonomics in association with their condition to facilitate self management of their condition  -WS     STG 2 Progress  Ongoing  -WS     STG 3  pt to report driving greater then 30-45 min. without exacerbatin of R knee pain to facilate ease of return to work activities  -WS     STG 3 Progress  Ongoing  -        Long Term Goals    LTG Date to Achieve  05/03/19  -     LTG 1  pt. to be I with advanced HEP to facilitate self management of their condition  -WS     LTG 1 Progress  Ongoing  -     LTG 2  pt. to report a KOS >/= 75% to demonstrate decreased level of perceived disability  -WS     LTG 3  pt to demonstrate near normal heel to toe gait pattern to facilitate ease/safety with community mobility  -WS     LTG 3 Progress  Ongoing  -     LTG 4  pt. to ascend/descends stairs with reciprocal pattern (</= 1 rails) to facilitate ease/safety of household/community mobility  -WS     LTG 4 Progress  Ongoing  -       User Key  (r) = Recorded By, (t) = Taken By, (c) = Cosigned By    Initials Name Provider Type    Jose G Helms PTA Physical Therapy Assistant          Therapy Education  Given: HEP, Symptoms/condition management, Pain management, Edema management  Program: Reinforced  How Provided: Verbal  Provided to: Patient              Time Calculation:   Start Time: 0820  Stop Time: 0850  Time Calculation (min): 30 min  Therapy Suggested Charges     Code   Minutes Charges    15406 (CPT®) Hc Pt  Neuromusc Re Education Ea 15 Min      43931 (CPT®) Hc Pt Ther Proc Ea 15 Min 30 2    14867 (CPT®) Hc Gait Training Ea 15 Min      30085 (CPT®) Hc Pt Therapeutic Act Ea 15 Min      90372 (CPT®) Hc Pt Manual Therapy Ea 15 Min      26194 (CPT®) Hc Pt Ther Massage- Per 15 Min      19739 (CPT®) Hc Pt Iontophoresis Ea 15 Min      25717 (CPT®) Hc Pt Elec Stim Ea-Per 15 Min      88855 (CPT®) Hc Pt Ultrasound Ea 15 Min      50528 (CPT®) Hc Pt Self Care/Mgmt/Train Ea 15 Min      14191 (CPT®) Hc Pt Prosthetic (S) Train Initial Encounter, Each 15 Min      56430 (CPT®) Hc Orthotic(S) Mgmt/Train Initial Encounter, Each 15min      73639 (CPT®) Hc Pt Aquatic Therapy Ea 15 Min      06280 (CPT®) Hc Pt Orthotic(S)/Prosthetic(S) Encounter, Each 15 Min       (CPT®) Hc Pt Electrical Stim Unattended      Total  30 2        Therapy Charges for Today     Code Description Service Date Service Provider Modifiers Qty    18111285135 HC PT THER PROC EA 15 MIN 3/8/2019 Jose G Ko, PTA GP 2                    Jose G Ko PTA  3/8/2019

## 2019-03-13 ENCOUNTER — HOSPITAL ENCOUNTER (OUTPATIENT)
Dept: PHYSICAL THERAPY | Facility: HOSPITAL | Age: 64
Setting detail: THERAPIES SERIES
Discharge: HOME | End: 2019-03-13

## 2019-03-13 DIAGNOSIS — Z98.890 S/P ARTHROSCOPIC SURGERY OF RIGHT KNEE: Primary | ICD-10-CM

## 2019-03-13 DIAGNOSIS — Z74.09 IMPAIRED MOBILITY: ICD-10-CM

## 2019-03-13 DIAGNOSIS — Z47.89 ORTHOPEDIC AFTERCARE: ICD-10-CM

## 2019-03-13 PROCEDURE — 97110 THERAPEUTIC EXERCISES: CPT

## 2019-03-13 NOTE — THERAPY TREATMENT NOTE
Outpatient Physical Therapy Ortho Treatment Note  [facility]     Patient Name: Jose Ramon Murcia  : 1955  MRN: [id number]  Today's Date: 3/13/2019      Visit Date: 2019    Visit Dx:    ICD-10-CM ICD-9-CM   1. S/P arthroscopic surgery of right knee Z98.890 V45.89   2. Impaired mobility Z74.09 799.89   3. Orthopedic aftercare Z47.89 V54.9       Patient Active Problem List   Diagnosis   • Allergic rhinitis   • Cervical spinal stenosis   • ED (erectile dysfunction) of non-organic origin   • BP (high blood pressure)   • Lumbar radiculopathy   • Neoplasm of skin   • Obstructive apnea   • Burning or prickling sensation   • Neck pain   • Benign non-nodular prostatic hyperplasia with lower urinary tract symptoms   • Shoulder pain, acute   • Urinary frequency   • Hyperlipidemia   • Chronic pain of right knee   • Rotator cuff tendonitis   • Acute medial meniscus tear of right knee        Past Medical History:   Diagnosis Date   • Abnormal EKG     IN PAST   • Allergic    • Arthritis    • ED (erectile dysfunction)    • GERD (gastroesophageal reflux disease)    • Hypertension     IN PAST  NO MEDS   • Kidney stone    • Narcolepsy    • PONV (postoperative nausea and vomiting)      AND    • Sleep apnea         Past Surgical History:   Procedure Laterality Date   • ANAL FISTULOTOMY     • APPENDECTOMY     • BACK SURGERY      CERVICAL   • COLONOSCOPY     • KNEE ARTHROSCOPY Right 2019    Procedure: Knee Arthroscopy with PARTICIAL MEDIAL Menisectomy;  Surgeon: Dwaine Connolly MD;  Location: [facility] OR [facility] [place];  Service: Orthopedics   • NECK SURGERY     • WISDOM TOOTH EXTRACTION                         PT Assessment/Plan     Row Name 19 1813          PT Assessment    Assessment Comments  Knee pain well controlled. Increased weight with several exercises.   -BELA       User Key  (r) = Recorded By, (t) = Taken By, (c) = Cosigned By    Initials Name Provider Type    Jose G Helms PTA Physical  Therapy Assistant              Exercises     Row Name 03/13/19 1750             Exercise 1    Exercise Name 1  QS  -WS      Reps 1  15  -WS      Time 1  5 s  -WS         Exercise 2    Exercise Name 2  LAQ  -WS      Cueing 2  Verbal;Demo  -WS      Reps 2  15  -WS      Additional Comments  4#  -WS         Exercise 3    Exercise Name 3  HR  -WS      Cueing 3  Verbal;Demo  -WS      Reps 3  15  -WS         Exercise 4    Exercise Name 4  standing HS curl,    -WS      Cueing 4  Verbal;Demo  -WS      Time 4  15  -WS      Additional Comments  3#  -WS         Exercise 5    Exercise Name 5  seated TKE into ball  -WS      Cueing 5  Verbal;Demo  -WS      Reps 5  15  -WS      Time 5  5 s  -WS         Exercise 6    Exercise Name 6  HS stretch at step  -WS      Reps 6  3  -WS      Time 6  20 s  -WS         Exercise 7    Exercise Name 7  gastroc stretch at step  -WS      Cueing 7  Verbal;Demo  -WS      Reps 7  3  -WS      Time 7  20 s  -WS         Exercise 8    Exercise Name 8  Nustep LE L5  -WS      Time 8  5 min  -WS         Exercise 9    Exercise Name 9  standing hip abd  -WS      Reps 9  15  -WS      Additional Comments  3#  -WS         Exercise 10    Exercise Name 10  SAQ  -WS      Reps 10  15  -WS      Additional Comments  4#  -WS        User Key  (r) = Recorded By, (t) = Taken By, (c) = Cosigned By    Initials Name Provider Type    Jose G Helms PTA Physical Therapy Assistant                         PT OP Goals     Row Name 03/13/19 1800          PT Short Term Goals    STG Date to Achieve  04/05/19  -WS     STG 1  pt. to be I with initial HEP to facilitate self management of their condition  -WS     STG 1 Progress  Met  -WS     STG 2  pt. to be educated in/verbalize understanding of the importance of posture/ergonomics in association with their condition to facilitate self management of their condition  -WS     STG 2 Progress  Ongoing  -WS     STG 3  pt to report driving greater then 30-45 min. without exacerbatin of  R knee pain to facilate ease of return to work activities  -     STG 3 Progress  Ongoing  -     STG 3 Progress Comments  20 min so far  -        Long Term Goals    LTG Date to Achieve  05/03/19  -     LTG 1  pt. to be I with advanced HEP to facilitate self management of their condition  -     LTG 1 Progress  Ongoing  -     LTG 2  pt. to report a KOS >/= 75% to demonstrate decreased level of perceived disability  -     LTG 3  pt to demonstrate near normal heel to toe gait pattern to facilitate ease/safety with community mobility  -     LTG 3 Progress  Met  -     LTG 4  pt. to ascend/descends stairs with reciprocal pattern (</= 1 rails) to facilitate ease/safety of household/community mobility  -     LTG 4 Progress  Ongoing  -       User Key  (r) = Recorded By, (t) = Taken By, (c) = Cosigned By    Initials Name Provider Type    Jose G Helms PTA Physical Therapy Assistant          Therapy Education  Given: HEP, Symptoms/condition management, Pain management  Program: Reinforced  How Provided: Verbal  Provided to: Patient              Time Calculation:   Start Time: 1750  Stop Time: 1820  Time Calculation (min): 30 min  Therapy Suggested Charges     Code   Minutes Charges    None           Therapy Charges for Today     Code Description Service Date Service Provider Modifiers Qty    56715536470 HC PT THER PROC EA 15 MIN 3/13/2019 Jose G Ko PTA GP 2                    Jose G Ko PTA  3/13/2019

## 2019-03-15 ENCOUNTER — HOSPITAL ENCOUNTER (OUTPATIENT)
Dept: PHYSICAL THERAPY | Facility: HOSPITAL | Age: 64
Setting detail: THERAPIES SERIES
Discharge: HOME | End: 2019-03-15

## 2019-03-15 DIAGNOSIS — M25.562 CHRONIC PAIN OF BOTH KNEES: ICD-10-CM

## 2019-03-15 DIAGNOSIS — Z98.890 S/P ARTHROSCOPIC SURGERY OF RIGHT KNEE: Primary | ICD-10-CM

## 2019-03-15 DIAGNOSIS — Z47.89 ORTHOPEDIC AFTERCARE: ICD-10-CM

## 2019-03-15 DIAGNOSIS — Z74.09 IMPAIRED MOBILITY: ICD-10-CM

## 2019-03-15 DIAGNOSIS — G89.29 CHRONIC PAIN OF BOTH KNEES: ICD-10-CM

## 2019-03-15 DIAGNOSIS — M25.561 CHRONIC PAIN OF BOTH KNEES: ICD-10-CM

## 2019-03-15 PROCEDURE — 97110 THERAPEUTIC EXERCISES: CPT | Performed by: PHYSICAL THERAPIST

## 2019-03-15 NOTE — THERAPY TREATMENT NOTE
Outpatient Physical Therapy Ortho Treatment Note  [facility]     Patient Name: Jose Ramon Murcia  : 1955  MRN: [id number]  Today's Date: 3/15/2019      Visit Date: 03/15/2019    Visit Dx:    ICD-10-CM ICD-9-CM   1. S/P arthroscopic surgery of right knee Z98.890 V45.89   2. Impaired mobility Z74.09 799.89   3. Orthopedic aftercare Z47.89 V54.9   4. Chronic pain of both knees M25.561 719.46    M25.562 338.29    G89.29        Patient Active Problem List   Diagnosis   • Allergic rhinitis   • Cervical spinal stenosis   • ED (erectile dysfunction) of non-organic origin   • BP (high blood pressure)   • Lumbar radiculopathy   • Neoplasm of skin   • Obstructive apnea   • Burning or prickling sensation   • Neck pain   • Benign non-nodular prostatic hyperplasia with lower urinary tract symptoms   • Shoulder pain, acute   • Urinary frequency   • Hyperlipidemia   • Chronic pain of right knee   • Rotator cuff tendonitis   • Acute medial meniscus tear of right knee        Past Medical History:   Diagnosis Date   • Abnormal EKG     IN PAST   • Allergic    • Arthritis    • ED (erectile dysfunction)    • GERD (gastroesophageal reflux disease)    • Hypertension     IN PAST  NO MEDS   • Kidney stone    • Narcolepsy    • PONV (postoperative nausea and vomiting)      AND    • Sleep apnea         Past Surgical History:   Procedure Laterality Date   • ANAL FISTULOTOMY     • APPENDECTOMY     • BACK SURGERY      CERVICAL   • COLONOSCOPY     • KNEE ARTHROSCOPY Right 2019    Procedure: Knee Arthroscopy with PARTICIAL MEDIAL Menisectomy;  Surgeon: Dwaine Connolly MD;  [place] [facility] [place] [facility] [place]  Service: Orthopedics   • NECK SURGERY     • WISDOM TOOTH EXTRACTION                         PT Assessment/Plan     Row Name 03/15/19 0918          PT Assessment    Assessment Comments  Mr. Murcia is 4 weeks s/p R knee menisectomy.  He is progressing quite well at this time.  We progressed his strengthening without  exacerbation of his symptoms. Mr. Murcia is progressing toward all functional goals.   -GJ        PT Plan    PT Plan Comments  warm up on Nustep, continue to progress LE strengthening, work on gait mechanics.   -GJ       User Key  (r) = Recorded By, (t) = Taken By, (c) = Cosigned By    Initials Name Provider Type    Cristi Murphy, PT Physical Therapist          Modalities     Row Name 03/15/19 0900             Ice    Patient denies application of Ice  Yes  -GJ      Patient reports will apply ice at home to involved area  Yes  -GJ        User Key  (r) = Recorded By, (t) = Taken By, (c) = Cosigned By    Initials Name Provider Type    Cristi Murphy, PT Physical Therapist          Exercises     Row Name 03/15/19 0836 03/15/19 0800          Subjective Comments    Subjective Comments  --  still a little discomfort around the (R) knee, but better then it was.  Able to drive 20-30 min without problem.   -GJ        Total Minutes    20324 - PT Therapeutic Exercise Minutes  44  -GJ  --        Exercise 1    Exercise Name 1  --  QS  -GJ     Cueing 1  --  Verbal;Demo  -GJ     Reps 1  --  15  -GJ     Time 1  --  5 s  -GJ        Exercise 2    Exercise Name 2  --  LAQ  -GJ     Cueing 2  --  Verbal;Demo  -GJ     Reps 2  --  20  -GJ     Additional Comments  --  5#  -GJ        Exercise 3    Exercise Name 3  --  HR  -GJ     Cueing 3  --  Verbal;Demo  -GJ     Reps 3  --  20  -GJ        Exercise 4    Exercise Name 4  --  standing HS curl,    -GJ     Cueing 4  --  Verbal;Demo  -GJ     Time 4  --  20  -GJ     Additional Comments  --  3#  -GJ        Exercise 5    Exercise Name 5  --  seated TKE into ball  -GJ     Cueing 5  --  Verbal;Demo  -GJ     Reps 5  --  15  -GJ     Time 5  --  5 s  -GJ        Exercise 6    Exercise Name 6  --  HS stretch at step  -GJ     Cueing 6  --  Verbal;Demo  -GJ     Reps 6  --  3  -GJ     Time 6  --  20 s  -GJ        Exercise 7    Exercise Name 7  --  gastroc stretch at step  -GJ     Cueing 7  --   Verbal;Demo  -GJ     Reps 7  --  3  -GJ     Time 7  --  20 s  -GJ        Exercise 8    Exercise Name 8  --  Nustep LE L5  -GJ     Time 8  --  5 min  -GJ        Exercise 9    Exercise Name 9  --  standing hip abd  -GJ     Cueing 9  --  Verbal;Demo  -GJ     Reps 9  --  20  -GJ     Additional Comments  --  3#  -GJ        Exercise 10    Exercise Name 10  --  SAQ  -GJ     Cueing 10  --  Verbal;Demo  -GJ     Reps 10  --  15  -GJ     Additional Comments  --  5#  -GJ        Exercise 11    Exercise Name 11  --  step up 6 inch step  -GJ     Cueing 11  --  Verbal;Demo  -GJ     Reps 11  --  15  -GJ       User Key  (r) = Recorded By, (t) = Taken By, (c) = Cosigned By    Initials Name Provider Type    Cristi Murphy, PT Physical Therapist                         PT OP Goals     Row Name 03/15/19 0800          PT Short Term Goals    STG Date to Achieve  04/05/19  -GJ     STG 1  pt. to be I with initial HEP to facilitate self management of their condition  -GJ     STG 1 Progress  Met  -GJ     STG 2  pt. to be educated in/verbalize understanding of the importance of posture/ergonomics in association with their condition to facilitate self management of their condition  -GJ     STG 2 Progress  Met  -GJ     STG 3  pt to report driving greater then 30-45 min. without exacerbatin of R knee pain to facilate ease of return to work activities  -GJ     STG 3 Progress  Progressing;Partially Met  -GJ     STG 3 Progress Comments  pt reports 20-30 min. drives without difficutly  -GJ        Long Term Goals    LTG Date to Achieve  05/03/19  -GJ     LTG 1  pt. to be I with advanced HEP to facilitate self management of their condition  -GJ     LTG 1 Progress  Ongoing  -GJ     LTG 2  pt. to report a KOS >/= 75% to demonstrate decreased level of perceived disability  -GJ     LTG 2 Progress  Ongoing  -GJ     LTG 3  pt to demonstrate near normal heel to toe gait pattern to facilitate ease/safety with community mobility  -GJ     LTG 3 Progress  Met   -GJ     LTG 4  pt. to ascend/descends stairs with reciprocal pattern (</= 1 rails) to facilitate ease/safety of household/community mobility  -GJ     LTG 4 Progress  Ongoing  -GJ     LTG 4 Progress Comments  intiated step ups today  -GJ       User Key  (r) = Recorded By, (t) = Taken By, (c) = Cosigned By    Initials Name Provider Type    GJ Cristi Morataya, PT Physical Therapist          Therapy Education  Education Details: reviewed process of healing and time frames  Given: HEP, Symptoms/condition management, Pain management, Mobility training, Posture/body mechanics, Edema management  Program: New, Reinforced, Progressed  How Provided: Verbal, Demonstration  Provided to: Patient  Level of Understanding: Teach back education performed, Verbalized, Demonstrated              Time Calculation:   Start Time: 0836  Stop Time: 0925  Time Calculation (min): 49 min  Therapy Suggested Charges     Code   Minutes Charges    57946 (CPT®) Hc Pt Neuromusc Re Education Ea 15 Min      87045 (CPT®) Hc Pt Ther Proc Ea 15 Min 44 3    99607 (CPT®) Hc Gait Training Ea 15 Min      05278 (CPT®) Hc Pt Therapeutic Act Ea 15 Min      32975 (CPT®) Hc Pt Manual Therapy Ea 15 Min      92638 (CPT®) Hc Pt Ther Massage- Per 15 Min      59280 (CPT®) Hc Pt Iontophoresis Ea 15 Min      41729 (CPT®) Hc Pt Elec Stim Ea-Per 15 Min      24871 (CPT®) Hc Pt Ultrasound Ea 15 Min      79495 (CPT®) Hc Pt Self Care/Mgmt/Train Ea 15 Min      21992 (CPT®) Hc Pt Prosthetic (S) Train Initial Encounter, Each 15 Min      04709 (CPT®) Hc Orthotic(S) Mgmt/Train Initial Encounter, Each 15min      77947 (CPT®) Hc Pt Aquatic Therapy Ea 15 Min      14398 (CPT®) Hc Pt Orthotic(S)/Prosthetic(S) Encounter, Each 15 Min       (CPT®) Hc Pt Electrical Stim Unattended      Total  44 3        Therapy Charges for Today     Code Description Service Date Service Provider Modifiers Qty    89217809740 HC PT THER PROC EA 15 MIN 3/15/2019 Cristi Morataya, PT GP 3                     Cristi Morataya, PT  3/15/2019

## 2019-03-18 ENCOUNTER — HOSPITAL ENCOUNTER (OUTPATIENT)
Dept: PHYSICAL THERAPY | Facility: HOSPITAL | Age: 64
Setting detail: THERAPIES SERIES
Discharge: HOME | End: 2019-03-18

## 2019-03-18 DIAGNOSIS — Z98.890 S/P ARTHROSCOPIC SURGERY OF RIGHT KNEE: Primary | ICD-10-CM

## 2019-03-18 DIAGNOSIS — Z47.89 ORTHOPEDIC AFTERCARE: ICD-10-CM

## 2019-03-18 DIAGNOSIS — Z74.09 IMPAIRED MOBILITY: ICD-10-CM

## 2019-03-18 PROCEDURE — 97110 THERAPEUTIC EXERCISES: CPT

## 2019-03-18 NOTE — THERAPY TREATMENT NOTE
Outpatient Physical Therapy Ortho Treatment Note  [facility]     Patient Name: Jose Ramon Murcia  : 1955  MRN: [id number]  Today's Date: 3/18/2019      Visit Date: 2019    Visit Dx:    ICD-10-CM ICD-9-CM   1. S/P arthroscopic surgery of right knee Z98.890 V45.89   2. Impaired mobility Z74.09 799.89   3. Orthopedic aftercare Z47.89 V54.9       Patient Active Problem List   Diagnosis   • Allergic rhinitis   • Cervical spinal stenosis   • ED (erectile dysfunction) of non-organic origin   • BP (high blood pressure)   • Lumbar radiculopathy   • Neoplasm of skin   • Obstructive apnea   • Burning or prickling sensation   • Neck pain   • Benign non-nodular prostatic hyperplasia with lower urinary tract symptoms   • Shoulder pain, acute   • Urinary frequency   • Hyperlipidemia   • Chronic pain of right knee   • Rotator cuff tendonitis   • Acute medial meniscus tear of right knee        Past Medical History:   Diagnosis Date   • Abnormal EKG     IN PAST   • Allergic    • Arthritis    • ED (erectile dysfunction)    • GERD (gastroesophageal reflux disease)    • Hypertension     IN PAST  NO MEDS   • Kidney stone    • Narcolepsy    • PONV (postoperative nausea and vomiting)      AND    • Sleep apnea         Past Surgical History:   Procedure Laterality Date   • ANAL FISTULOTOMY     • APPENDECTOMY     • BACK SURGERY      CERVICAL   • COLONOSCOPY     • KNEE ARTHROSCOPY Right 2019    Procedure: Knee Arthroscopy with PARTICIAL MEDIAL Menisectomy;  Surgeon: Dwaine Connolly MD;  Location: [facility] OR [facility] [place];  Service: Orthopedics   • NECK SURGERY     • WISDOM TOOTH EXTRACTION                         PT Assessment/Plan     Row Name 197          PT Assessment    Assessment Comments  Progressing wel with strengthening right knee s/p menisectomy. Denied need for ice  -WS       User Key  (r) = Recorded By, (t) = Taken By, (c) = Cosigned By    Initials Name Provider Type    Jose G Helms  PTA Physical Therapy Assistant              Exercises     Row Name 03/18/19 1740             Subjective Comments    Subjective Comments  No pain right knee just maybe some constant pressure. Improved going up/down stairs  -WS         Total Minutes    73357 - PT Therapeutic Exercise Minutes  45  -WS         Exercise 1    Exercise Name 1  QS  -WS      Cueing 1  Verbal;Demo  -WS      Reps 1  15  -WS      Time 1  5 s  -WS         Exercise 2    Exercise Name 2  LAQ  -WS      Cueing 2  Verbal;Demo  -WS      Reps 2  20  -WS      Additional Comments  7#  -WS         Exercise 3    Exercise Name 3  HR steps  -WS      Cueing 3  Verbal;Demo  -WS      Reps 3  20  -WS         Exercise 4    Exercise Name 4  standing HS curl,    -WS      Cueing 4  Verbal;Demo  -WS      Time 4  20  -WS      Additional Comments  4#  -WS         Exercise 5    Exercise Name 5  seated TKE into ball  -WS      Cueing 5  Verbal;Demo  -WS      Reps 5  15  -WS      Time 5  5 s  -WS         Exercise 6    Exercise Name 6  HS stretch at step  -WS      Cueing 6  Verbal;Demo  -WS      Reps 6  3  -WS      Time 6  20 s  -WS         Exercise 7    Exercise Name 7  gastroc stretch at step  -WS      Cueing 7  Verbal;Demo  -WS      Reps 7  3  -WS      Time 7  20 s  -WS         Exercise 8    Exercise Name 8  Nustep UE/LE L5  -WS      Time 8  5 min  -WS         Exercise 9    Exercise Name 9  standing hip abd  -WS      Cueing 9  Verbal;Demo  -WS      Reps 9  20  -WS      Additional Comments  4#  -WS         Exercise 10    Exercise Name 10  SAQ  -WS      Reps 10  15  -WS      Additional Comments  7#  -WS         Exercise 11    Exercise Name 11  step up 6 inch step  -WS      Cueing 11  Verbal;Demo  -WS      Reps 11  15  -WS        User Key  (r) = Recorded By, (t) = Taken By, (c) = Cosigned By    Initials Name Provider Type    Jose G Helms PTA Physical Therapy Assistant                         PT OP Goals     Row Name 03/18/19 1800          PT Short Term Goals    STG  Date to Achieve  04/05/19  -WS     STG 1  pt. to be I with initial HEP to facilitate self management of their condition  -WS     STG 1 Progress  Met  -WS     STG 2  pt. to be educated in/verbalize understanding of the importance of posture/ergonomics in association with their condition to facilitate self management of their condition  -WS     STG 2 Progress  Met  -WS     STG 3  pt to report driving greater then 30-45 min. without exacerbatin of R knee pain to facilate ease of return to work activities  -WS     STG 3 Progress  Progressing;Partially Met  -        Long Term Goals    LTG Date to Achieve  05/03/19  -WS     LTG 1  pt. to be I with advanced HEP to facilitate self management of their condition  -WS     LTG 1 Progress  Ongoing  -WS     LTG 2  pt. to report a KOS >/= 75% to demonstrate decreased level of perceived disability  -WS     LTG 2 Progress  Ongoing  -WS     LTG 3  pt to demonstrate near normal heel to toe gait pattern to facilitate ease/safety with community mobility  -WS     LTG 3 Progress  Met  -WS     LTG 4  pt. to ascend/descends stairs with reciprocal pattern (</= 1 rails) to facilitate ease/safety of household/community mobility  -WS     LTG 4 Progress  Ongoing  -       User Key  (r) = Recorded By, (t) = Taken By, (c) = Cosigned By    Initials Name Provider Type    Jose G Helms PTA Physical Therapy Assistant          Therapy Education  Given: HEP, Symptoms/condition management, Pain management, Edema management  Program: Reinforced  How Provided: Verbal  Provided to: Patient              Time Calculation:   Start Time: 1740  Stop Time: 1825  Time Calculation (min): 45 min  Therapy Suggested Charges     Code   Minutes Charges    03974 (CPT®) Hc Pt Neuromusc Re Education Ea 15 Min      37517 (CPT®) Hc Pt Ther Proc Ea 15 Min 45 3    61502 (CPT®) Hc Gait Training Ea 15 Min      43992 (CPT®) Hc Pt Therapeutic Act Ea 15 Min      85945 (CPT®) Hc Pt Manual Therapy Ea 15 Min      97772  (CPT®) Hc Pt Ther Massage- Per 15 Min      33879 (CPT®) Hc Pt Iontophoresis Ea 15 Min      69993 (CPT®) Hc Pt Elec Stim Ea-Per 15 Min      00262 (CPT®) Hc Pt Ultrasound Ea 15 Min      10465 (CPT®) Hc Pt Self Care/Mgmt/Train Ea 15 Min      26516 (CPT®) Hc Pt Prosthetic (S) Train Initial Encounter, Each 15 Min      74591 (CPT®) Hc Orthotic(S) Mgmt/Train Initial Encounter, Each 15min      99327 (CPT®) Hc Pt Aquatic Therapy Ea 15 Min      75115 (CPT®) Hc Pt Orthotic(S)/Prosthetic(S) Encounter, Each 15 Min       (CPT®) Hc Pt Electrical Stim Unattended      Total  45 3        Therapy Charges for Today     Code Description Service Date Service Provider Modifiers Qty    96706648721 HC PT THER PROC EA 15 MIN 3/18/2019 Jose G Ko, PTA GP 3                    Jose G Ko PTA  3/18/2019

## 2019-03-20 ENCOUNTER — APPOINTMENT (OUTPATIENT)
Dept: PHYSICAL THERAPY | Facility: HOSPITAL | Age: 64
End: 2019-03-20

## 2019-03-22 ENCOUNTER — OFFICE VISIT (OUTPATIENT)
Dept: ORTHOPEDIC SURGERY | Facility: CLINIC | Age: 64
End: 2019-03-22

## 2019-03-22 VITALS — HEIGHT: 72 IN | WEIGHT: 285 LBS | BODY MASS INDEX: 38.6 KG/M2 | TEMPERATURE: 98.1 F

## 2019-03-22 DIAGNOSIS — Z09 SURGERY FOLLOW-UP: Primary | ICD-10-CM

## 2019-03-22 PROCEDURE — 99024 POSTOP FOLLOW-UP VISIT: CPT | Performed by: ORTHOPAEDIC SURGERY

## 2019-03-22 NOTE — PROGRESS NOTES
Jose Ramon Murcia : 1955 MRN: [id number] DATE: 3/22/2019    CC: 6 weeks s/p right knee scope with medial meniscectomy    Vitals:    19 0906   Temp: 98.1 °F (36.7 °C)       HPI: Pt. returns to clinic today for follow up.  Reports some soreness but knee is progressing well overall.  Denies any concerns or problems.    Exam:  Wounds appear well healed.  Calf soft.  Negative Chad's sign.  Full knee motion.  Good motor and sensory function in foot.  Good pedal pulses.  Gait appears normal.    Imaging   none    Impression:  6 weeks s/p right knee scope with medial meniscectomy    Plan:    1.  Doing well overall.  2.  Have encouraged patient that residual soreness, swelling may persist for several months.  3.  Recommended icing, anti-inflammatories as needed for occasional soreness.  4.  Will release to follow up as needed going forward--encourage the patient to call in lingering soreness persists or develops any new symptoms.  5.  As he was leaving, he did request a prescription for some orthotics.  He has custom orthotics that he has worn for over 20 years.  They are worn out and he wants to get new ones.  I gave him a prescription as requested.

## 2019-03-25 ENCOUNTER — HOSPITAL ENCOUNTER (OUTPATIENT)
Dept: PHYSICAL THERAPY | Facility: HOSPITAL | Age: 64
Setting detail: THERAPIES SERIES
Discharge: HOME | End: 2019-03-25

## 2019-03-25 DIAGNOSIS — M25.562 CHRONIC PAIN OF BOTH KNEES: ICD-10-CM

## 2019-03-25 DIAGNOSIS — Z47.89 ORTHOPEDIC AFTERCARE: ICD-10-CM

## 2019-03-25 DIAGNOSIS — G89.29 CHRONIC PAIN OF BOTH KNEES: ICD-10-CM

## 2019-03-25 DIAGNOSIS — Z98.890 S/P ARTHROSCOPIC SURGERY OF RIGHT KNEE: Primary | ICD-10-CM

## 2019-03-25 DIAGNOSIS — M25.561 CHRONIC PAIN OF BOTH KNEES: ICD-10-CM

## 2019-03-25 DIAGNOSIS — Z74.09 IMPAIRED MOBILITY: ICD-10-CM

## 2019-03-25 PROCEDURE — 97110 THERAPEUTIC EXERCISES: CPT

## 2019-03-25 NOTE — THERAPY TREATMENT NOTE
Outpatient Physical Therapy Ortho Treatment Note  [facility]     Patient Name: Jose Ramon Murcia  : 1955  MRN: [id number]  Today's Date: 3/25/2019      Visit Date: 2019    Visit Dx:    ICD-10-CM ICD-9-CM   1. S/P arthroscopic surgery of right knee Z98.890 V45.89   2. Impaired mobility Z74.09 799.89   3. Orthopedic aftercare Z47.89 V54.9   4. Chronic pain of both knees M25.561 719.46    M25.562 338.29    G89.29        Patient Active Problem List   Diagnosis   • Allergic rhinitis   • Cervical spinal stenosis   • ED (erectile dysfunction) of non-organic origin   • BP (high blood pressure)   • Lumbar radiculopathy   • Neoplasm of skin   • Obstructive apnea   • Burning or prickling sensation   • Neck pain   • Benign non-nodular prostatic hyperplasia with lower urinary tract symptoms   • Shoulder pain, acute   • Urinary frequency   • Hyperlipidemia   • Chronic pain of right knee   • Rotator cuff tendonitis   • Acute medial meniscus tear of right knee        Past Medical History:   Diagnosis Date   • Abnormal EKG     IN PAST   • Allergic    • Arthritis    • ED (erectile dysfunction)    • GERD (gastroesophageal reflux disease)    • Hypertension     IN PAST  NO MEDS   • Kidney stone    • Narcolepsy    • PONV (postoperative nausea and vomiting)      AND    • Sleep apnea         Past Surgical History:   Procedure Laterality Date   • ANAL FISTULOTOMY     • APPENDECTOMY     • BACK SURGERY      CERVICAL   • COLONOSCOPY     • KNEE ARTHROSCOPY Right 2019    Procedure: Knee Arthroscopy with PARTICIAL MEDIAL Menisectomy;  Surgeon: Dwaine Connolly MD;  Location: [facility] OR [facility] [place];  Service: Orthopedics   • NECK SURGERY     • WISDOM TOOTH EXTRACTION                         PT Assessment/Plan     Row Name 19 1810          PT Assessment    Assessment Comments  Patient improving strength for stairs. MD visit went well. Will increase weight with LAQ/SAQ next visit  -WS       User Key  (r) = Recorded  By, (t) = Taken By, (c) = Cosigned By    Initials Name Provider Type    Jose G Helms PTA Physical Therapy Assistant            Exercises     Row Name 03/25/19 5035             Subjective Comments    Subjective Comments  Steps are a litle better  -WS         Subjective Pain    Able to rate subjective pain?  yes  -WS      Pre-Treatment Pain Level  1  -WS         Total Minutes    00985 - PT Therapeutic Exercise Minutes  40  -WS         Exercise 1    Exercise Name 1  QS  -WS      Cueing 1  Verbal;Demo  -WS      Reps 1  20  -WS      Time 1  5 s  -WS         Exercise 2    Exercise Name 2  LAQ  -WS      Cueing 2  Verbal;Demo  -WS      Reps 2  20  -WS      Additional Comments  7#  -WS         Exercise 3    Exercise Name 3  HR steps  -WS      Cueing 3  Verbal;Demo  -WS      Reps 3  20  -WS         Exercise 4    Exercise Name 4  standing HS curl,    -WS      Cueing 4  Verbal;Demo  -WS      Time 4  20  -WS      Additional Comments  4#  -WS         Exercise 5    Exercise Name 5  seated TKE into ball  -WS      Cueing 5  Verbal;Demo  -WS      Reps 5  15  -WS      Time 5  5 s  -WS         Exercise 6    Exercise Name 6  HS stretch at step  -WS      Cueing 6  Verbal;Demo  -WS      Reps 6  3  -WS      Time 6  20 s  -WS         Exercise 7    Exercise Name 7  gastroc stretch at step  -WS      Cueing 7  Verbal;Demo  -WS      Reps 7  3  -WS      Time 7  20 s  -WS         Exercise 8    Exercise Name 8  Nustep UE/LE L5  -WS      Time 8  5 min  -WS         Exercise 9    Exercise Name 9  standing hip abd  -WS      Cueing 9  Verbal;Demo  -WS      Reps 9  20  -WS      Additional Comments  4#  -WS         Exercise 10    Exercise Name 10  SAQ  -WS      Reps 10  20  -WS      Additional Comments  7#  -WS         Exercise 11    Exercise Name 11  step up 6 inch step  -WS      Cueing 11  Verbal;Demo  -WS      Reps 11  15  -WS        User Key  (r) = Recorded By, (t) = Taken By, (c) = Cosigned By    Initials Name Provider Type    BELA Ko  MADISON Araiza Physical Therapy Assistant                       PT OP Goals     Row Name 03/25/19 1800          PT Short Term Goals    STG Date to Achieve  04/05/19  -     STG 1  pt. to be I with initial HEP to facilitate self management of their condition  -WS     STG 1 Progress  Met  -WS     STG 2  pt. to be educated in/verbalize understanding of the importance of posture/ergonomics in association with their condition to facilitate self management of their condition  -WS     STG 2 Progress  Met  -     STG 3  pt to report driving greater then 30-45 min. without exacerbatin of R knee pain to facilate ease of return to work activities  -WS     STG 3 Progress  Progressing;Partially Met  -        Long Term Goals    LTG Date to Achieve  05/03/19  -WS     LTG 1  pt. to be I with advanced HEP to facilitate self management of their condition  -WS     LTG 1 Progress  Ongoing  -     LTG 2  pt. to report a KOS >/= 75% to demonstrate decreased level of perceived disability  -WS     LTG 2 Progress  Ongoing  -     LTG 3  pt to demonstrate near normal heel to toe gait pattern to facilitate ease/safety with community mobility  -WS     LTG 3 Progress  Met  -     LTG 4  pt. to ascend/descends stairs with reciprocal pattern (</= 1 rails) to facilitate ease/safety of household/community mobility  -WS     LTG 4 Progress  Ongoing  -       User Key  (r) = Recorded By, (t) = Taken By, (c) = Cosigned By    Initials Name Provider Type    Jose G Helms PTA Physical Therapy Assistant          Therapy Education  Given: HEP, Symptoms/condition management, Pain management  Program: Reinforced  How Provided: Verbal  Provided to: Patient              Time Calculation:   Start Time: 1740  Stop Time: 1820  Time Calculation (min): 40 min  Therapy Charges for Today     Code Description Service Date Service Provider Modifiers Qty    82457789725 HC PT THER PROC EA 15 MIN 3/25/2019 Jose G Ko PTA GP 3                     Jose G Ko, PTA  3/25/2019

## 2019-03-27 ENCOUNTER — HOSPITAL ENCOUNTER (OUTPATIENT)
Dept: PHYSICAL THERAPY | Facility: HOSPITAL | Age: 64
Setting detail: THERAPIES SERIES
Discharge: HOME | End: 2019-03-27

## 2019-03-27 DIAGNOSIS — Z98.890 S/P ARTHROSCOPIC SURGERY OF RIGHT KNEE: Primary | ICD-10-CM

## 2019-03-27 DIAGNOSIS — G89.29 CHRONIC PAIN OF BOTH KNEES: ICD-10-CM

## 2019-03-27 DIAGNOSIS — Z74.09 IMPAIRED MOBILITY: ICD-10-CM

## 2019-03-27 DIAGNOSIS — M25.562 CHRONIC PAIN OF BOTH KNEES: ICD-10-CM

## 2019-03-27 DIAGNOSIS — Z47.89 ORTHOPEDIC AFTERCARE: ICD-10-CM

## 2019-03-27 DIAGNOSIS — M25.561 CHRONIC PAIN OF BOTH KNEES: ICD-10-CM

## 2019-03-27 PROCEDURE — 97110 THERAPEUTIC EXERCISES: CPT

## 2019-03-27 NOTE — THERAPY TREATMENT NOTE
Outpatient Physical Therapy Ortho Treatment Note  [facility]     Patient Name: Jose Ramon Murcia  : 1955  MRN: [id number]  Today's Date: 3/27/2019      Visit Date: 2019    Visit Dx:    ICD-10-CM ICD-9-CM   1. S/P arthroscopic surgery of right knee Z98.890 V45.89   2. Impaired mobility Z74.09 799.89   3. Orthopedic aftercare Z47.89 V54.9   4. Chronic pain of both knees M25.561 719.46    M25.562 338.29    G89.29        Patient Active Problem List   Diagnosis   • Allergic rhinitis   • Cervical spinal stenosis   • ED (erectile dysfunction) of non-organic origin   • BP (high blood pressure)   • Lumbar radiculopathy   • Neoplasm of skin   • Obstructive apnea   • Burning or prickling sensation   • Neck pain   • Benign non-nodular prostatic hyperplasia with lower urinary tract symptoms   • Shoulder pain, acute   • Urinary frequency   • Hyperlipidemia   • Chronic pain of right knee   • Rotator cuff tendonitis   • Acute medial meniscus tear of right knee        Past Medical History:   Diagnosis Date   • Abnormal EKG     IN PAST   • Allergic    • Arthritis    • ED (erectile dysfunction)    • GERD (gastroesophageal reflux disease)    • Hypertension     IN PAST  NO MEDS   • Kidney stone    • Narcolepsy    • PONV (postoperative nausea and vomiting)      AND    • Sleep apnea         Past Surgical History:   Procedure Laterality Date   • ANAL FISTULOTOMY     • APPENDECTOMY     • BACK SURGERY      CERVICAL   • COLONOSCOPY     • KNEE ARTHROSCOPY Right 2019    Procedure: Knee Arthroscopy with PARTICIAL MEDIAL Menisectomy;  Surgeon: Dwaine Connolly MD;  [place] [facility] [place] [facility] [place];  Service: Orthopedics   • NECK SURGERY     • WISDOM TOOTH EXTRACTION                         PT Assessment/Plan     Row Name 19 1800          PT Assessment    Assessment Comments  Continue to progress strengthening. Increased weight with LAQ/SAQ.   -WS       User Key  (r) = Recorded By, (t) = Taken By, (c) = Cosigned  By    Initials Name Provider Type    Jose G Helms PTA Physical Therapy Assistant            Exercises     Row Name 03/27/19 1012             Subjective Comments    Subjective Comments  No right knee pain. intermittent medial discomfort  -WS         Subjective Pain    Able to rate subjective pain?  yes  -WS      Pre-Treatment Pain Level  0  -WS         Total Minutes    22638 - PT Therapeutic Exercise Minutes  35  -WS         Exercise 1    Exercise Name 1  QS  -WS      Cueing 1  Verbal;Demo  -WS      Reps 1  20  -WS      Time 1  5 s  -WS         Exercise 2    Exercise Name 2  LAQ  -WS      Cueing 2  Verbal;Demo  -WS      Reps 2  20  -WS      Additional Comments  10#  -WS         Exercise 3    Exercise Name 3  HR steps  -WS      Cueing 3  Verbal;Demo  -WS      Reps 3  20  -WS         Exercise 4    Exercise Name 4  standing HS curl,    -WS      Cueing 4  Verbal;Demo  -WS      Time 4  20  -WS      Additional Comments  5#  -WS         Exercise 5    Exercise Name 5  seated TKE into ball  -WS      Cueing 5  Verbal;Demo  -WS      Reps 5  15  -WS      Time 5  5 s  -WS         Exercise 6    Exercise Name 6  HS stretch at step  -WS      Cueing 6  Verbal;Demo  -WS      Reps 6  3  -WS      Time 6  20 s  -WS         Exercise 7    Exercise Name 7  gastroc stretch at step  -WS      Cueing 7  Verbal;Demo  -WS      Reps 7  3  -WS      Time 7  20 s  -WS         Exercise 8    Exercise Name 8  Nustep UE/LE L5  -WS      Time 8  5 min  -WS         Exercise 9    Exercise Name 9  standing hip abd  -WS      Cueing 9  Verbal;Demo  -WS      Reps 9  20  -WS      Additional Comments  5#  -WS         Exercise 10    Exercise Name 10  SAQ  -WS      Reps 10  20  -WS      Additional Comments  10#  -WS         Exercise 11    Exercise Name 11  step up 6 inch step  -WS      Cueing 11  Verbal;Demo  -WS      Reps 11  15  -WS        User Key  (r) = Recorded By, (t) = Taken By, (c) = Cosigned By    Initials Name Provider Type    BELA Ko  MADISON Araiza Physical Therapy Assistant                       PT OP Goals     Row Name 03/27/19 1800          PT Short Term Goals    STG Date to Achieve  04/05/19  -     STG 1  pt. to be I with initial HEP to facilitate self management of their condition  -WS     STG 1 Progress  Met  -WS     STG 2  pt. to be educated in/verbalize understanding of the importance of posture/ergonomics in association with their condition to facilitate self management of their condition  -WS     STG 2 Progress  Met  -WS     STG 3  pt to report driving greater then 30-45 min. without exacerbatin of R knee pain to facilate ease of return to work activities  -WS     STG 3 Progress  Progressing;Partially Met  -        Long Term Goals    LTG Date to Achieve  05/03/19  -WS     LTG 1  pt. to be I with advanced HEP to facilitate self management of their condition  -WS     LTG 1 Progress  Ongoing  -     LTG 2  pt. to report a KOS >/= 75% to demonstrate decreased level of perceived disability  -WS     LTG 2 Progress  Ongoing  -     LTG 3  pt to demonstrate near normal heel to toe gait pattern to facilitate ease/safety with community mobility  -WS     LTG 3 Progress  Met  -     LTG 4  pt. to ascend/descends stairs with reciprocal pattern (</= 1 rails) to facilitate ease/safety of household/community mobility  -WS     LTG 4 Progress  Ongoing  -       User Key  (r) = Recorded By, (t) = Taken By, (c) = Cosigned By    Initials Name Provider Type    Jose G Helms PTA Physical Therapy Assistant          Therapy Education  Given: HEP, Symptoms/condition management  Program: Reinforced  How Provided: Verbal  Provided to: Patient              Time Calculation:   Start Time: 1745  Stop Time: 1820  Time Calculation (min): 35 min  Therapy Charges for Today     Code Description Service Date Service Provider Modifiers Qty    23599650952 HC PT THER PROC EA 15 MIN 3/27/2019 Jose G Ko PTA GP 2                    Jose G Ko  PTA  3/27/2019

## 2019-04-01 ENCOUNTER — HOSPITAL ENCOUNTER (OUTPATIENT)
Dept: PHYSICAL THERAPY | Facility: HOSPITAL | Age: 64
Setting detail: THERAPIES SERIES
Discharge: HOME | End: 2019-04-01

## 2019-04-01 DIAGNOSIS — M25.562 CHRONIC PAIN OF BOTH KNEES: ICD-10-CM

## 2019-04-01 DIAGNOSIS — M25.561 CHRONIC PAIN OF BOTH KNEES: ICD-10-CM

## 2019-04-01 DIAGNOSIS — Z47.89 ORTHOPEDIC AFTERCARE: ICD-10-CM

## 2019-04-01 DIAGNOSIS — Z74.09 IMPAIRED MOBILITY: ICD-10-CM

## 2019-04-01 DIAGNOSIS — Z98.890 S/P ARTHROSCOPIC SURGERY OF RIGHT KNEE: Primary | ICD-10-CM

## 2019-04-01 DIAGNOSIS — G89.29 CHRONIC PAIN OF BOTH KNEES: ICD-10-CM

## 2019-04-01 PROCEDURE — 97110 THERAPEUTIC EXERCISES: CPT

## 2019-04-01 NOTE — THERAPY TREATMENT NOTE
Outpatient Physical Therapy Ortho Treatment Note  [facility]     Patient Name: Jose Ramon Murcia  : 1955  MRN: [id number]  Today's Date: 2019      Visit Date: 2019    Visit Dx:    ICD-10-CM ICD-9-CM   1. S/P arthroscopic surgery of right knee Z98.890 V45.89   2. Impaired mobility Z74.09 799.89   3. Orthopedic aftercare Z47.89 V54.9   4. Chronic pain of both knees M25.561 719.46    M25.562 338.29    G89.29        Patient Active Problem List   Diagnosis   • Allergic rhinitis   • Cervical spinal stenosis   • ED (erectile dysfunction) of non-organic origin   • BP (high blood pressure)   • Lumbar radiculopathy   • Neoplasm of skin   • Obstructive apnea   • Burning or prickling sensation   • Neck pain   • Benign non-nodular prostatic hyperplasia with lower urinary tract symptoms   • Shoulder pain, acute   • Urinary frequency   • Hyperlipidemia   • Chronic pain of right knee   • Rotator cuff tendonitis   • Acute medial meniscus tear of right knee        Past Medical History:   Diagnosis Date   • Abnormal EKG     IN PAST   • Allergic    • Arthritis    • ED (erectile dysfunction)    • GERD (gastroesophageal reflux disease)    • Hypertension     IN PAST  NO MEDS   • Kidney stone    • Narcolepsy    • PONV (postoperative nausea and vomiting)      AND    • Sleep apnea         Past Surgical History:   Procedure Laterality Date   • ANAL FISTULOTOMY     • APPENDECTOMY     • BACK SURGERY      CERVICAL   • COLONOSCOPY     • KNEE ARTHROSCOPY Right 2019    Procedure: Knee Arthroscopy with PARTICIAL MEDIAL Menisectomy;  Surgeon: Dwaine Connolly MD;  [facility]: [facility] OR [facility] [place];  Service: Orthopedics   • NECK SURGERY     • WISDOM TOOTH EXTRACTION                         PT Assessment/Plan     Row Name 197          PT Assessment    Assessment Comments  Patien ambulating with normal gait. Right knee strength continue to improve.   -WS       User Key  (r) = Recorded By, (t) = Taken By, (c) =  Cosigned By    Initials Name Provider Type    Jose G Helms PTA Physical Therapy Assistant            Exercises     Row Name 04/01/19 2335             Subjective Comments    Subjective Comments  knee pain low  -WS         Subjective Pain    Able to rate subjective pain?  yes  -WS      Pre-Treatment Pain Level  1  -WS         Total Minutes    01890 - PT Therapeutic Exercise Minutes  35  -WS         Exercise 1    Exercise Name 1  QS  -WS      Cueing 1  Verbal;Demo  -WS      Reps 1  20  -WS      Time 1  5 s  -WS         Exercise 2    Exercise Name 2  LAQ  -WS      Cueing 2  Verbal;Demo  -WS      Reps 2  20  -WS      Additional Comments  10#  -WS         Exercise 3    Exercise Name 3  HR steps  -WS      Cueing 3  Verbal;Demo  -WS      Reps 3  20  -WS         Exercise 4    Exercise Name 4  standing HS curl,    -WS      Cueing 4  Verbal;Demo  -WS      Time 4  20  -WS      Additional Comments  5#  -WS         Exercise 5    Exercise Name 5  seated TKE into ball  -WS      Cueing 5  Verbal;Demo  -WS      Reps 5  15  -WS      Time 5  5 s  -WS         Exercise 6    Exercise Name 6  HS stretch at step  -WS      Cueing 6  Verbal;Demo  -WS      Reps 6  3  -WS      Time 6  20 s  -WS         Exercise 7    Exercise Name 7  gastroc stretch at step  -WS      Cueing 7  Verbal;Demo  -WS      Reps 7  3  -WS      Time 7  20 s  -WS         Exercise 8    Exercise Name 8  Nustep UE/LE L5  -WS      Time 8  5 min  -WS         Exercise 9    Exercise Name 9  standing hip abd  -WS      Cueing 9  Verbal;Demo  -WS      Reps 9  20  -WS      Additional Comments  5#  -WS         Exercise 10    Exercise Name 10  SAQ  -WS      Reps 10  20  -WS      Additional Comments  10#  -WS         Exercise 11    Exercise Name 11  step up 6 inch step  -WS      Cueing 11  Verbal;Demo  -WS      Reps 11  15  -WS        User Key  (r) = Recorded By, (t) = Taken By, (c) = Cosigned By    Initials Name Provider Type    Jose G Helms PTA Physical Therapy  Assistant                       PT OP Goals     Row Name 04/01/19 1800          PT Short Term Goals    STG Date to Achieve  04/05/19  -     STG 1  pt. to be I with initial HEP to facilitate self management of their condition  -WS     STG 1 Progress  Met  -WS     STG 2  pt. to be educated in/verbalize understanding of the importance of posture/ergonomics in association with their condition to facilitate self management of their condition  -WS     STG 2 Progress  Met  -     STG 3  pt to report driving greater then 30-45 min. without exacerbatin of R knee pain to facilate ease of return to work activities  -WS     STG 3 Progress  Progressing;Partially Met  -        Long Term Goals    LTG Date to Achieve  05/03/19  -WS     LTG 1  pt. to be I with advanced HEP to facilitate self management of their condition  -WS     LTG 1 Progress  Ongoing  -     LTG 2  pt. to report a KOS >/= 75% to demonstrate decreased level of perceived disability  -     LTG 2 Progress  Ongoing  -     LTG 3  pt to demonstrate near normal heel to toe gait pattern to facilitate ease/safety with community mobility  -WS     LTG 3 Progress  Met  -     LTG 4  pt. to ascend/descends stairs with reciprocal pattern (</= 1 rails) to facilitate ease/safety of household/community mobility  -     LTG 4 Progress  Ongoing  -       User Key  (r) = Recorded By, (t) = Taken By, (c) = Cosigned By    Initials Name Provider Type    Jose G Helms PTA Physical Therapy Assistant          Therapy Education  Given: HEP, Symptoms/condition management, Pain management, Edema management  Program: Reinforced  How Provided: Verbal  Provided to: Patient              Time Calculation:   Start Time: 1745  Stop Time: 1820  Time Calculation (min): 35 min  Therapy Charges for Today     Code Description Service Date Service Provider Modifiers Qty    04832903983 HC PT THER PROC EA 15 MIN 4/1/2019 Jose G Ko PTA GP 2                    Jose G Ko  PTA  4/1/2019

## 2019-04-05 ENCOUNTER — HOSPITAL ENCOUNTER (OUTPATIENT)
Dept: PHYSICAL THERAPY | Facility: HOSPITAL | Age: 64
Setting detail: THERAPIES SERIES
Discharge: HOME | End: 2019-04-05

## 2019-04-05 DIAGNOSIS — Z74.09 IMPAIRED MOBILITY: ICD-10-CM

## 2019-04-05 DIAGNOSIS — Z47.89 ORTHOPEDIC AFTERCARE: ICD-10-CM

## 2019-04-05 DIAGNOSIS — Z98.890 S/P ARTHROSCOPIC SURGERY OF RIGHT KNEE: Primary | ICD-10-CM

## 2019-04-05 PROCEDURE — 97110 THERAPEUTIC EXERCISES: CPT

## 2019-04-05 NOTE — THERAPY TREATMENT NOTE
Outpatient Physical Therapy Ortho Treatment Note  [facility]     Patient Name: Jose Ramon Murcia  : 1955  MRN: [id number]  Today's Date: 2019      Visit Date: 2019    Visit Dx:    ICD-10-CM ICD-9-CM   1. S/P arthroscopic surgery of right knee Z98.890 V45.89   2. Impaired mobility Z74.09 799.89   3. Orthopedic aftercare Z47.89 V54.9       Patient Active Problem List   Diagnosis   • Allergic rhinitis   • Cervical spinal stenosis   • ED (erectile dysfunction) of non-organic origin   • BP (high blood pressure)   • Lumbar radiculopathy   • Neoplasm of skin   • Obstructive apnea   • Burning or prickling sensation   • Neck pain   • Benign non-nodular prostatic hyperplasia with lower urinary tract symptoms   • Shoulder pain, acute   • Urinary frequency   • Hyperlipidemia   • Chronic pain of right knee   • Rotator cuff tendonitis   • Acute medial meniscus tear of right knee        Past Medical History:   Diagnosis Date   • Abnormal EKG     IN PAST   • Allergic    • Arthritis    • ED (erectile dysfunction)    • GERD (gastroesophageal reflux disease)    • Hypertension     IN PAST  NO MEDS   • Kidney stone    • Narcolepsy    • PONV (postoperative nausea and vomiting)      AND    • Sleep apnea         Past Surgical History:   Procedure Laterality Date   • ANAL FISTULOTOMY     • APPENDECTOMY     • BACK SURGERY      CERVICAL   • COLONOSCOPY     • KNEE ARTHROSCOPY Right 2019    Procedure: Knee Arthroscopy with PARTICIAL MEDIAL Menisectomy;  Surgeon: Dwaine Connolly MD;  Location: [facility] OR [facility] [place];  Service: Orthopedics   • NECK SURGERY     • WISDOM TOOTH EXTRACTION                         PT Assessment/Plan     Row Name 19 0900          PT Assessment    Assessment Comments  Patient reports minimal pain at most throughout the day, but reports he cont to have difficulty transferring out of low seats. Progressed B LE strengthening today.  -CA        PT Plan    PT Plan Comments  Cont to  "progress, consider addition of leg press per pt tolerance.  -CA       User Key  (r) = Recorded By, (t) = Taken By, (c) = Cosigned By    Initials Name Provider Type    Arcelia Ruiz, PT Physical Therapist          Modalities     Row Name 04/05/19 0900             Ice    Ice Applied  Yes  -CA      Location  R knee  -CA      Rx Minutes  10 mins  -CA      Ice S/P Rx  Yes  -CA        User Key  (r) = Recorded By, (t) = Taken By, (c) = Cosigned By    Initials Name Provider Type    Arcelia Ruiz, PT Physical Therapist        Exercises     Row Name 04/05/19 0900             Subjective Comments    Subjective Comments  little to no knee pain  -CA         Subjective Pain    Able to rate subjective pain?  yes  -CA      Subjective Pain Comment  \"0.4/10\"  -CA         Total Minutes    28613 - PT Therapeutic Exercise Minutes  38  -CA         Exercise 1    Exercise Name 1  QS  -CA      Cueing 1  Verbal;Demo  -CA      Reps 1  20  -CA      Time 1  5 s  -CA         Exercise 2    Exercise Name 2  LAQ  -CA      Cueing 2  Verbal;Demo  -CA      Reps 2  20  -CA         Exercise 3    Exercise Name 3  HR steps  -CA      Cueing 3  Verbal;Demo  -CA      Reps 3  20  -CA         Exercise 4    Exercise Name 4  standing HS curl,    -CA      Cueing 4  Verbal;Demo  -CA      Time 4  20  -CA         Exercise 5    Exercise Name 5  seated TKE into ball  -CA      Cueing 5  Verbal;Demo  -CA      Reps 5  15  -CA      Time 5  5 s  -CA      Additional Comments  5#  -CA         Exercise 6    Exercise Name 6  HS stretch at step  -CA      Cueing 6  Verbal;Demo  -CA      Reps 6  3  -CA      Time 6  20 s  -CA         Exercise 7    Exercise Name 7  gastroc stretch at step  -CA      Cueing 7  Verbal;Demo  -CA      Reps 7  3  -CA      Time 7  20 s  -CA         Exercise 8    Exercise Name 8  Nustep UE/LE L5  -CA      Time 8  5 min  -CA         Exercise 9    Exercise Name 9  standing hip abd  -CA      Cueing 9  Verbal;Demo  -CA      Reps 9  20  -CA      " Additional Comments  5#  -CA         Exercise 10    Exercise Name 10  SAQ  -CA      Reps 10  20  -CA         Exercise 11    Exercise Name 11  step up 6 inch step  -CA      Cueing 11  Verbal;Demo  -CA      Reps 11  15  -CA         Exercise 12    Exercise Name 12  mini squat at ballet bar  -CA      Cueing 12  Verbal;Demo  -CA      Reps 12  15  -CA         Exercise 13    Exercise Name 13  RTB side step  -CA      Cueing 13  Verbal;Demo  -CA      Reps 13  3 laps x 15 step ea  -CA      Additional Comments  B; band just distal to knees  -CA        User Key  (r) = Recorded By, (t) = Taken By, (c) = Cosigned By    Initials Name Provider Type    Arcelia Ruiz, PT Physical Therapist            Time Calculation:   Start Time: 0915  Stop Time: 1003  Time Calculation (min): 48 min  Total Timed Code Minutes- PT: 38 minute(s)  Therapy Charges for Today     Code Description Service Date Service Provider Modifiers Qty    72382912747 HC PT THER PROC EA 15 MIN 4/5/2019 Arcelia Zambrano, PT GP 3                    Arcelia Zambrano, PT  4/5/2019

## 2019-04-19 ENCOUNTER — HOSPITAL ENCOUNTER (OUTPATIENT)
Dept: PHYSICAL THERAPY | Facility: HOSPITAL | Age: 64
Setting detail: THERAPIES SERIES
Discharge: HOME | End: 2019-04-19

## 2019-04-19 DIAGNOSIS — Z74.09 IMPAIRED MOBILITY: ICD-10-CM

## 2019-04-19 DIAGNOSIS — Z47.89 ORTHOPEDIC AFTERCARE: ICD-10-CM

## 2019-04-19 DIAGNOSIS — Z98.890 S/P ARTHROSCOPIC SURGERY OF RIGHT KNEE: Primary | ICD-10-CM

## 2019-04-19 PROCEDURE — 97110 THERAPEUTIC EXERCISES: CPT

## 2019-04-19 NOTE — THERAPY DISCHARGE NOTE
Outpatient Physical Therapy Ortho Treatment Note/Discharge Summary  [facility]     Patient Name: Jose Ramon Murcia  : 1955  MRN: [id number]  Today's Date: 2019      Visit Date: 2019    Visit Dx:    ICD-10-CM ICD-9-CM   1. S/P arthroscopic surgery of right knee Z98.890 V45.89   2. Impaired mobility Z74.09 799.89   3. Orthopedic aftercare Z47.89 V54.9       Patient Active Problem List   Diagnosis   • Allergic rhinitis   • Cervical spinal stenosis   • ED (erectile dysfunction) of non-organic origin   • BP (high blood pressure)   • Lumbar radiculopathy   • Neoplasm of skin   • Obstructive apnea   • Burning or prickling sensation   • Neck pain   • Benign non-nodular prostatic hyperplasia with lower urinary tract symptoms   • Shoulder pain, acute   • Urinary frequency   • Hyperlipidemia   • Chronic pain of right knee   • Rotator cuff tendonitis   • Acute medial meniscus tear of right knee        Past Medical History:   Diagnosis Date   • Abnormal EKG     IN PAST   • Allergic    • Arthritis    • ED (erectile dysfunction)    • GERD (gastroesophageal reflux disease)    • Hypertension     IN PAST  NO MEDS   • Kidney stone    • Narcolepsy    • PONV (postoperative nausea and vomiting)      AND    • Sleep apnea         Past Surgical History:   Procedure Laterality Date   • ANAL FISTULOTOMY     • APPENDECTOMY     • BACK SURGERY      CERVICAL   • COLONOSCOPY     • KNEE ARTHROSCOPY Right 2019    Procedure: Knee Arthroscopy with PARTICIAL MEDIAL Menisectomy;  Surgeon: Dwaine Connolly MD;  Location: [facility] [place] [facility] [place]  Service: Orthopedics   • NECK SURGERY     • WISDOM TOOTH EXTRACTION                         PT Assessment/Plan     Row Name 19 1432          PT Assessment    Assessment Comments  Pt has participated in 10 skilled PT sessions s/p R knee arthroscopic menisectomy 19. He demonstrates good progression of strengthening, function, and ROM of R knee with minimal pain  typically. One recent episode of inc knee pain with twisting movement but able to maage with rest and ice. Pt's KOS disability score has reduced to 25% disability. His R knee strength is 5/5 and his R Knee AROM is WFL. He is able to ascend/descend stairs reciprocally. He is compliant with HEP and understands independent management strategies. He is appropriate for d/c today to HEP.   -LB        PT Plan    PT Plan Comments  d/c to HEP  -LB       User Key  (r) = Recorded By, (t) = Taken By, (c) = Cosigned By    Initials Name Provider Type    LB Chantel Good, PT Physical Therapist              Exercises     Row Name 04/19/19 1300             Subjective Comments    Subjective Comments  I had an incident playing pool 2 weeks ago where I had lateral knee pain for 4 days. It is better now but i was a little concerned. My biggest issue is stepping backwards on  to my knee.  -LB         Subjective Pain    Able to rate subjective pain?  yes  -LB      Pre-Treatment Pain Level  0  -LB         Total Minutes    76527 - PT Therapeutic Exercise Minutes  40  -LB         Exercise 1    Exercise Name 1  QS  -LB      Cueing 1  Verbal;Demo  -LB      Reps 1  20  -LB      Time 1  5 s  -LB      Additional Comments  seated into blue ball  -LB         Exercise 2    Exercise Name 2  LAQ  -LB      Cueing 2  Verbal;Demo  -LB      Reps 2  20  -LB      Additional Comments  10#; slow eccentric  -LB         Exercise 3    Exercise Name 3  HR steps  -LB      Cueing 3  Verbal;Demo  -LB      Reps 3  20  -LB         Exercise 4    Exercise Name 4  standing HS curl,    -LB      Cueing 4  Verbal;Demo  -LB      Time 4  20  -LB      Additional Comments  5#  -LB         Exercise 5    Exercise Name 5  standing TKE  -LB      Reps 5  15  -LB      Time 5  3  -LB      Additional Comments  GTB  -LB         Exercise 6    Exercise Name 6  HS stretch at step  -LB      Cueing 6  Verbal;Demo  -LB      Reps 6  3  -LB      Time 6  20 s  -LB         Exercise 7    Exercise  Name 7  gastroc stretch at step  -LB      Cueing 7  Verbal;Demo  -LB      Reps 7  3  -LB      Time 7  20 s  -LB         Exercise 8    Exercise Name 8  Nustep UE/LE L5  -LB      Time 8  5 min  -LB         Exercise 9    Exercise Name 9  standing hip abd  -LB      Cueing 9  Verbal;Demo  -LB      Reps 9  20  -LB      Additional Comments  5#  -LB         Exercise 10    Exercise Name 10  SAQ  -LB      Reps 10  20  -LB      Additional Comments  10#  -LB         Exercise 11    Exercise Name 11  --  -LB      Cueing 11  --  -LB      Reps 11  --  -LB         Exercise 12    Exercise Name 12  mini squat at ballet bar  -LB      Cueing 12  Verbal;Demo  -LB      Reps 12  15  -LB         Exercise 13    Exercise Name 13  GTB side step  -LB      Cueing 13  Verbal;Demo  -LB      Reps 13  3 laps x 15 step ea  -LB      Additional Comments  B; band just distal to knees  -LB         Exercise 14    Exercise Name 14  GTB retro walking  -LB      Reps 14  3 laps  -LB        User Key  (r) = Recorded By, (t) = Taken By, (c) = Cosigned By    Initials Name Provider Type    LB Chantel Good, PT Physical Therapist                         PT OP Goals     Row Name 04/19/19 1400          PT Short Term Goals    STG Date to Achieve  04/05/19  -LB     STG 1  pt. to be I with initial HEP to facilitate self management of their condition  -LB     STG 1 Progress  Met  -LB     STG 2  pt. to be educated in/verbalize understanding of the importance of posture/ergonomics in association with their condition to facilitate self management of their condition  -LB     STG 2 Progress  Met  -LB     STG 3  pt to report driving greater then 30-45 min. without exacerbatin of R knee pain to facilate ease of return to work activities  -LB     STG 3 Progress  Met  -LB     STG 4 Progress Comments     -LB        Long Term Goals    LTG Date to Achieve  05/03/19  -LB     LTG 1  pt. to be I with advanced HEP to facilitate self management of their condition  -LB     LTG 1 Progress   Met  -LB     LTG 2  pt. to report a KOS >/= 75% to demonstrate decreased level of perceived disability  -LB     LTG 2 Progress  Met  -LB     LTG 2 Progress Comments  75%  -LB     LTG 3  pt to demonstrate near normal heel to toe gait pattern to facilitate ease/safety with community mobility  -LB     LTG 3 Progress  Met  -LB     LTG 4  pt. to ascend/descends stairs with reciprocal pattern (</= 1 rails) to facilitate ease/safety of household/community mobility  -LB     LTG 4 Progress  Met  -LB       User Key  (r) = Recorded By, (t) = Taken By, (c) = Cosigned By    Initials Name Provider Type    Chantel Armstrong, PT Physical Therapist                         Time Calculation:   Start Time: 1400  Stop Time: 1445  Time Calculation (min): 45 min  Total Timed Code Minutes- PT: 40 minute(s)  Therapy Charges for Today     Code Description Service Date Service Provider Modifiers Qty    75075669413 HC PT THER PROC EA 15 MIN 4/19/2019 Chantel Good, PT GP 3                OP PT Discharge Summary  Date of Discharge: 04/19/19  Reason for Discharge: All goals achieved  Outcomes Achieved: Able to achieve all goals within established timeline  Discharge Destination: Home with home program  Discharge Instructions/Additional Comments: see assessment       Chantel Good PT  4/19/2019        Joanna Ritchie  (RN)  2019 19:33:14

## 2019-04-25 ENCOUNTER — APPOINTMENT (OUTPATIENT)
Dept: PHYSICAL THERAPY | Facility: HOSPITAL | Age: 64
End: 2019-04-25

## 2019-05-29 DIAGNOSIS — Z00.00 ROUTINE HEALTH MAINTENANCE: Primary | ICD-10-CM

## 2019-05-29 DIAGNOSIS — Z12.5 SCREENING PSA (PROSTATE SPECIFIC ANTIGEN): ICD-10-CM

## 2019-06-07 ENCOUNTER — CLINICAL SUPPORT (OUTPATIENT)
Dept: FAMILY MEDICINE CLINIC | Facility: CLINIC | Age: 64
End: 2019-06-07

## 2019-06-07 DIAGNOSIS — Z00.00 ROUTINE HEALTH MAINTENANCE: Primary | ICD-10-CM

## 2019-06-07 PROCEDURE — 85025 COMPLETE CBC W/AUTO DIFF WBC: CPT | Performed by: INTERNAL MEDICINE

## 2019-06-08 LAB
ALBUMIN SERPL-MCNC: 4.2 G/DL (ref 3.5–5.2)
ALBUMIN/GLOB SERPL: 1.8 G/DL
ALP SERPL-CCNC: 97 U/L (ref 39–117)
ALT SERPL-CCNC: 37 U/L (ref 1–41)
APPEARANCE UR: CLEAR
AST SERPL-CCNC: 17 U/L (ref 1–40)
BILIRUB SERPL-MCNC: 0.4 MG/DL (ref 0.2–1.2)
BILIRUB UR QL STRIP: NEGATIVE
BUN SERPL-MCNC: 13 MG/DL (ref 8–23)
BUN/CREAT SERPL: 13.4 (ref 7–25)
CALCIUM SERPL-MCNC: 9.7 MG/DL (ref 8.6–10.5)
CHLORIDE SERPL-SCNC: 102 MMOL/L (ref 98–107)
CHOLEST SERPL-MCNC: 155 MG/DL (ref 0–200)
CO2 SERPL-SCNC: 28.9 MMOL/L (ref 22–29)
COLOR UR: YELLOW
CREAT SERPL-MCNC: 0.97 MG/DL (ref 0.76–1.27)
GLOBULIN SER CALC-MCNC: 2.3 GM/DL
GLUCOSE SERPL-MCNC: 118 MG/DL (ref 65–99)
GLUCOSE UR QL: NEGATIVE
HDLC SERPL-MCNC: 42 MG/DL (ref 40–60)
HGB UR QL STRIP: NEGATIVE
KETONES UR QL STRIP: NEGATIVE
LDLC SERPL CALC-MCNC: 84 MG/DL (ref 0–100)
LDLC/HDLC SERPL: 2.01 {RATIO}
LEUKOCYTE ESTERASE UR QL STRIP: NEGATIVE
NITRITE UR QL STRIP: NEGATIVE
PH UR STRIP: 6.5 [PH] (ref 5–8)
POTASSIUM SERPL-SCNC: 4.6 MMOL/L (ref 3.5–5.2)
PROT SERPL-MCNC: 6.5 G/DL (ref 6–8.5)
PROT UR QL STRIP: NEGATIVE
PSA SERPL-MCNC: 0.53 NG/ML (ref 0–4)
SODIUM SERPL-SCNC: 142 MMOL/L (ref 136–145)
SP GR UR: 1.02 (ref 1–1.03)
TRIGL SERPL-MCNC: 143 MG/DL (ref 0–150)
UROBILINOGEN UR STRIP-MCNC: NORMAL MG/DL
VLDLC SERPL CALC-MCNC: 28.6 MG/DL

## 2019-06-21 ENCOUNTER — CLINICAL SUPPORT (OUTPATIENT)
Dept: FAMILY MEDICINE CLINIC | Facility: CLINIC | Age: 64
End: 2019-06-21

## 2019-06-21 ENCOUNTER — OFFICE VISIT (OUTPATIENT)
Dept: FAMILY MEDICINE CLINIC | Facility: CLINIC | Age: 64
End: 2019-06-21

## 2019-06-21 VITALS
DIASTOLIC BLOOD PRESSURE: 90 MMHG | WEIGHT: 282 LBS | HEIGHT: 72 IN | OXYGEN SATURATION: 97 % | HEART RATE: 79 BPM | SYSTOLIC BLOOD PRESSURE: 130 MMHG | BODY MASS INDEX: 38.19 KG/M2

## 2019-06-21 DIAGNOSIS — Z00.00 ROUTINE HEALTH MAINTENANCE: Primary | ICD-10-CM

## 2019-06-21 DIAGNOSIS — Z00.00 PHYSICAL EXAM, ANNUAL: Primary | ICD-10-CM

## 2019-06-21 DIAGNOSIS — R73.03 PREDIABETES: ICD-10-CM

## 2019-06-21 DIAGNOSIS — R53.82 CHRONIC FATIGUE: ICD-10-CM

## 2019-06-21 LAB
DEVELOPER EXPIRATION DATE: NORMAL
DEVELOPER LOT NUMBER: NORMAL
EXPIRATION DATE: NORMAL
FECAL OCCULT BLOOD SCREEN, POC: NEGATIVE
Lab: NORMAL
NEGATIVE CONTROL: NEGATIVE
POSITIVE CONTROL: POSITIVE

## 2019-06-21 PROCEDURE — 99396 PREV VISIT EST AGE 40-64: CPT | Performed by: INTERNAL MEDICINE

## 2019-06-21 PROCEDURE — 71046 X-RAY EXAM CHEST 2 VIEWS: CPT | Performed by: INTERNAL MEDICINE

## 2019-06-21 PROCEDURE — 82270 OCCULT BLOOD FECES: CPT | Performed by: INTERNAL MEDICINE

## 2019-06-29 PROBLEM — R73.03 PREDIABETES: Status: ACTIVE | Noted: 2019-06-29

## 2019-06-29 LAB
TESTOST FREE SERPL-MCNC: 6.4 PG/ML (ref 6.6–18.1)
TESTOST SERPL-MCNC: 209 NG/DL (ref 264–916)
TSH SERPL DL<=0.005 MIU/L-ACNC: 0.94 MIU/ML (ref 0.27–4.2)
VIT B12 SERPL-MCNC: 462 PG/ML (ref 211–946)

## 2019-06-29 NOTE — PROGRESS NOTES
Subjective   Jose Ramon Murcia is a 64 y.o. male. Patient is here today for   Chief Complaint   Patient presents with   • Annual Exam          Vitals:    06/21/19 1104   BP: 130/90   Pulse: 79   SpO2: 97%       The following portions of the patient's history were reviewed and updated as appropriate: allergies, current medications, past family history, past medical history, past social history, past surgical history and problem list.    Past Medical History:   Diagnosis Date   • Abnormal EKG     IN PAST   • Allergic    • Arthritis    • ED (erectile dysfunction)    • GERD (gastroesophageal reflux disease)    • Hypertension     IN PAST  NO MEDS   • Kidney stone    • Narcolepsy    • PONV (postoperative nausea and vomiting)     1969 AND 1998   • Sleep apnea       Allergies   Allergen Reactions   • Clarithromycin Other (See Comments)     Unable to eat    • Sulfa Antibiotics Myalgia      Social History     Socioeconomic History   • Marital status:      Spouse name: Not on file   • Number of children: Not on file   • Years of education: Not on file   • Highest education level: Not on file   Tobacco Use   • Smoking status: Never Smoker   • Smokeless tobacco: Never Used   Substance and Sexual Activity   • Alcohol use: No     Frequency: Never     Comment: social   • Drug use: No   • Sexual activity: Defer        Current Outpatient Medications:   •  amphetamine-dextroamphetamine (ADDERALL) 20 MG tablet, Take 20 mg by mouth Daily. Take 2 tablets by mouth every morning and 1 tablet by mouth at noon, Disp: , Rfl:   •  azelastine (ASTELIN) 0.1 % nasal spray, 1 spray into the nostril(s) as directed by provider Every Night. Use in each nostril as directed , Disp: , Rfl:   •  esomeprazole (nexIUM) 40 MG capsule, Take 40 mg by mouth 2 (Two) Times a Day., Disp: , Rfl:   •  fluticasone (FLONASE) 50 MCG/ACT nasal spray, 2 sprays into the nostril(s) as directed by provider Every Night., Disp: , Rfl:      EKG  ECG  Report  Electrocardiogram today was not done.  It was not indicated.    He had a normal PA and lateral chest x-ray.  Objective   This pleasant patient is here for a comprehensive physical exam.    He complains of fatigue.    He has obstructive sleep apnea and is compliant with use of CPAP.       Jose Ramon Murcia 64 y.o. male who presents for an Annual Wellness Visit.  he has a history of   Patient Active Problem List   Diagnosis   • Allergic rhinitis   • Cervical spinal stenosis   • ED (erectile dysfunction) of non-organic origin   • BP (high blood pressure)   • Lumbar radiculopathy   • Neoplasm of skin   • Obstructive apnea   • Burning or prickling sensation   • Neck pain   • Benign non-nodular prostatic hyperplasia with lower urinary tract symptoms   • Shoulder pain, acute   • Urinary frequency   • Hyperlipidemia   • Chronic pain of right knee   • Rotator cuff tendonitis   • Acute medial meniscus tear of right knee   • Prediabetes   .  he has been doing well with new interval problems.  Labs results discussed in detail with the patient.  Plan to update vaccines if needed today.      Lab Results (most recent)     Procedure Component Value Units Date/Time    POCT occult blood x 1 stool [[id number]]  (Normal) Collected:  06/21/19 1304    Specimen:  Stool Updated:  06/21/19 1306     Fecal Occult Blood Negative     Lot Number [id number]     Expiration Date 05/31/2020     DEVELOPER LOT NUMBER [id number]     DEVELOPER EXPIRATION DATE 05/31/2020     Positive Control Positive     Negative Control Negative            Review of Systems   Constitutional: Positive for fatigue.   HENT: Negative.    Respiratory: Negative.    Cardiovascular: Negative.    Musculoskeletal: Negative.    Neurological: Negative.    Psychiatric/Behavioral: Negative.        Physical Exam   Constitutional: He is oriented to person, place, and time. He appears well-developed and well-nourished. No distress.   Pleasant, neatly groomed, BMI 38.   HENT:    Head: Normocephalic and atraumatic.   Right Ear: External ear normal.   Left Ear: External ear normal.   Nose: Nose normal.   Mouth/Throat: Oropharynx is clear and moist. No oropharyngeal exudate.   Eyes: Conjunctivae and EOM are normal. Pupils are equal, round, and reactive to light. Right eye exhibits no discharge. Left eye exhibits no discharge. No scleral icterus.   Neck: Normal range of motion. Neck supple. No JVD present. No tracheal deviation present. No thyromegaly present.   Cardiovascular: Normal rate, regular rhythm, normal heart sounds and intact distal pulses. Exam reveals no gallop and no friction rub.   No murmur heard.  Pulmonary/Chest: Effort normal and breath sounds normal. No stridor. No respiratory distress. He has no wheezes. He has no rales. He exhibits no tenderness.   Abdominal: Soft. Bowel sounds are normal. He exhibits no distension and no mass. There is no tenderness. There is no rebound and no guarding. No hernia.   Genitourinary: Rectum normal, prostate normal and penis normal. Rectal exam shows guaiac negative stool. No penile tenderness.   Musculoskeletal: Normal range of motion. He exhibits no edema, tenderness or deformity.   Lymphadenopathy:     He has no cervical adenopathy.   Neurological: He is alert and oriented to person, place, and time. He displays normal reflexes. No cranial nerve deficit or sensory deficit. He exhibits normal muscle tone. Coordination normal.   Skin: Skin is warm and dry. Capillary refill takes less than 2 seconds. No rash noted. He is not diaphoretic. No erythema.   Psychiatric: He has a normal mood and affect. His behavior is normal. Judgment and thought content normal.   Nursing note and vitals reviewed.      ASSESSMENT       Problem List Items Addressed This Visit        Other    Prediabetes      Other Visit Diagnoses     Physical exam, annual    -  Primary    Relevant Orders    POCT occult blood x 1 stool (Completed)    XR Chest PA & Lateral (In  Office)    Chronic fatigue        Relevant Orders    Testosterone, Free, Total    Vitamin B12    TSH Rfx On Abnormal To Free T4          PLAN  He complains of fatigue.  I will check a testosterone both free and total, vitamin B12 and TSH.    He has obstructive sleep apnea and is compliant with use of CPAP.  He takes Adderall twice daily to help promote wakefulness.  Nevertheless, he is fatigued.    He is obese with a BMI of 38.  He is prediabetic with a fasting glucose last week of 118.  Of encouraged regular aerobic exercise according to the American Heart Association guidelines.    Of encouraged weight loss via decrease caloric intake.    Of asked him to follow-up in 6 months.      There are no Patient Instructions on file for this visit.    No Follow-up on file.

## 2019-09-13 DIAGNOSIS — R73.03 PREDIABETES: ICD-10-CM

## 2019-09-13 DIAGNOSIS — E78.5 HYPERLIPIDEMIA, UNSPECIFIED HYPERLIPIDEMIA TYPE: Primary | ICD-10-CM

## 2019-09-14 LAB
ALBUMIN SERPL-MCNC: 4.2 G/DL (ref 3.5–5.2)
ALBUMIN/GLOB SERPL: 1.9 G/DL
ALP SERPL-CCNC: 91 U/L (ref 39–117)
ALT SERPL-CCNC: 42 U/L (ref 1–41)
AST SERPL-CCNC: 24 U/L (ref 1–40)
BASOPHILS # BLD AUTO: 0.04 10*3/MM3 (ref 0–0.2)
BASOPHILS NFR BLD AUTO: 0.5 % (ref 0–1.5)
BILIRUB SERPL-MCNC: 0.6 MG/DL (ref 0.2–1.2)
BUN SERPL-MCNC: 11 MG/DL (ref 8–23)
BUN/CREAT SERPL: 13.4 (ref 7–25)
CALCIUM SERPL-MCNC: 8.6 MG/DL (ref 8.6–10.5)
CHLORIDE SERPL-SCNC: 100 MMOL/L (ref 98–107)
CHOLEST SERPL-MCNC: 161 MG/DL (ref 0–200)
CO2 SERPL-SCNC: 26.8 MMOL/L (ref 22–29)
CREAT SERPL-MCNC: 0.82 MG/DL (ref 0.76–1.27)
EOSINOPHIL # BLD AUTO: 0.24 10*3/MM3 (ref 0–0.4)
EOSINOPHIL NFR BLD AUTO: 3.2 % (ref 0.3–6.2)
ERYTHROCYTE [DISTWIDTH] IN BLOOD BY AUTOMATED COUNT: 14.8 % (ref 12.3–15.4)
GLOBULIN SER CALC-MCNC: 2.2 GM/DL
GLUCOSE SERPL-MCNC: 172 MG/DL (ref 65–99)
HBA1C MFR BLD: 6.1 % (ref 4.8–5.6)
HCT VFR BLD AUTO: 48.5 % (ref 37.5–51)
HDLC SERPL-MCNC: 42 MG/DL (ref 40–60)
HGB BLD-MCNC: 15.6 G/DL (ref 13–17.7)
IMM GRANULOCYTES # BLD AUTO: 0.02 10*3/MM3 (ref 0–0.05)
IMM GRANULOCYTES NFR BLD AUTO: 0.3 % (ref 0–0.5)
LDLC SERPL CALC-MCNC: 94 MG/DL (ref 0–100)
LDLC/HDLC SERPL: 2.24 {RATIO}
LYMPHOCYTES # BLD AUTO: 1.19 10*3/MM3 (ref 0.7–3.1)
LYMPHOCYTES NFR BLD AUTO: 16.1 % (ref 19.6–45.3)
MCH RBC QN AUTO: 29.2 PG (ref 26.6–33)
MCHC RBC AUTO-ENTMCNC: 32.2 G/DL (ref 31.5–35.7)
MCV RBC AUTO: 90.8 FL (ref 79–97)
MONOCYTES # BLD AUTO: 0.41 10*3/MM3 (ref 0.1–0.9)
MONOCYTES NFR BLD AUTO: 5.5 % (ref 5–12)
NEUTROPHILS # BLD AUTO: 5.5 10*3/MM3 (ref 1.7–7)
NEUTROPHILS NFR BLD AUTO: 74.4 % (ref 42.7–76)
NRBC BLD AUTO-RTO: 0 /100 WBC (ref 0–0.2)
PLATELET # BLD AUTO: 160 10*3/MM3 (ref 140–450)
POTASSIUM SERPL-SCNC: 4.1 MMOL/L (ref 3.5–5.2)
PROT SERPL-MCNC: 6.4 G/DL (ref 6–8.5)
RBC # BLD AUTO: 5.34 10*6/MM3 (ref 4.14–5.8)
SODIUM SERPL-SCNC: 140 MMOL/L (ref 136–145)
TRIGL SERPL-MCNC: 125 MG/DL (ref 0–150)
VLDLC SERPL CALC-MCNC: 25 MG/DL
WBC # BLD AUTO: 7.4 10*3/MM3 (ref 3.4–10.8)

## 2019-09-24 ENCOUNTER — OFFICE VISIT (OUTPATIENT)
Dept: FAMILY MEDICINE CLINIC | Facility: CLINIC | Age: 64
End: 2019-09-24

## 2019-09-24 VITALS
HEIGHT: 72 IN | DIASTOLIC BLOOD PRESSURE: 82 MMHG | HEART RATE: 77 BPM | BODY MASS INDEX: 38.22 KG/M2 | SYSTOLIC BLOOD PRESSURE: 158 MMHG | OXYGEN SATURATION: 98 % | WEIGHT: 282.2 LBS | TEMPERATURE: 98.3 F | RESPIRATION RATE: 16 BRPM

## 2019-09-24 DIAGNOSIS — E78.5 HYPERLIPIDEMIA, UNSPECIFIED HYPERLIPIDEMIA TYPE: ICD-10-CM

## 2019-09-24 DIAGNOSIS — R73.03 PREDIABETES: ICD-10-CM

## 2019-09-24 DIAGNOSIS — I10 ESSENTIAL HYPERTENSION: Primary | ICD-10-CM

## 2019-09-24 PROCEDURE — 99214 OFFICE O/P EST MOD 30 MIN: CPT | Performed by: INTERNAL MEDICINE

## 2019-09-24 RX ORDER — AZELASTINE 1 MG/ML
1 SPRAY, METERED NASAL NIGHTLY
Qty: 1 EACH | Refills: 5 | Status: SHIPPED | OUTPATIENT
Start: 2019-09-24 | End: 2020-01-22

## 2019-09-24 NOTE — PROGRESS NOTES
Subjective   Jose Ramon Murcia is a 64 y.o. male. Patient is here today for   Chief Complaint   Patient presents with   • Follow-up     hld          Vitals:    09/24/19 0751   BP: 158/82   Pulse: 77   Resp: 16   Temp: 98.3 °F (36.8 °C)   SpO2: 98%       Past Medical History:   Diagnosis Date   • Abnormal EKG     IN PAST   • Allergic    • Arthritis    • ED (erectile dysfunction)    • GERD (gastroesophageal reflux disease)    • Hypertension     IN PAST  NO MEDS   • Kidney stone    • Narcolepsy    • PONV (postoperative nausea and vomiting)     1969 AND 1998   • Sleep apnea       Allergies   Allergen Reactions   • Clarithromycin Other (See Comments)     Unable to eat    • Sulfa Antibiotics Myalgia      Social History     Socioeconomic History   • Marital status:      Spouse name: Not on file   • Number of children: Not on file   • Years of education: Not on file   • Highest education level: Not on file   Tobacco Use   • Smoking status: Never Smoker   • Smokeless tobacco: Never Used   Substance and Sexual Activity   • Alcohol use: No     Frequency: Never     Comment: social   • Drug use: No   • Sexual activity: Defer        Current Outpatient Medications:   •  amphetamine-dextroamphetamine (ADDERALL) 20 MG tablet, Take 20 mg by mouth Daily. Take 2 tablets by mouth every morning and 1 tablet by mouth at noon, Disp: , Rfl:   •  azelastine (ASTELIN) 0.1 % nasal spray, 1 spray into the nostril(s) as directed by provider Every Night. Use in each nostril as directed, Disp: 1 each, Rfl: 5  •  esomeprazole (nexIUM) 40 MG capsule, Take 40 mg by mouth 2 (Two) Times a Day., Disp: , Rfl:   •  fluticasone (FLONASE) 50 MCG/ACT nasal spray, 2 sprays into the nostril(s) as directed by provider Every Night., Disp: , Rfl:      Objective      This patient is here to follow-up on labs done a week or 2 ago.             Review of Systems   Constitutional: Negative.    HENT: Negative.    Respiratory: Negative.    Cardiovascular:  Negative.    Musculoskeletal: Negative.    Psychiatric/Behavioral: Negative.        Physical Exam   Constitutional: He is oriented to person, place, and time. He appears well-developed and well-nourished.   Pleasant, neatly groomed, BMI 38   HENT:   Head: Normocephalic and atraumatic.   Cardiovascular: Normal rate, regular rhythm and normal heart sounds.   Pulmonary/Chest: Effort normal and breath sounds normal.   Neurological: He is alert and oriented to person, place, and time.   Psychiatric: He has a normal mood and affect. His behavior is normal.   Nursing note and vitals reviewed.        Problem List Items Addressed This Visit        Cardiovascular and Mediastinum    BP (high blood pressure) - Primary    Hyperlipidemia       Other    Prediabetes            PLAN  No Follow-up on file. His blood pressure is a bit elevated today.    Hemoglobin A1c looks good at 6.1%.    His hypertension is a bit high today.  When I rechecked his blood pressure while sitting in his left arm, I got 140/82.    I asked him to follow-up for a comprehensive physical exam in about 6 months.    I discussed starting him on a cholesterol-lowering medication which I did not do today.    He is obese with a BMI of 38.  Of encouraged weight loss via regular aerobic activity and decrease caloric intake.

## 2019-12-19 DIAGNOSIS — R73.03 PREDIABETES: ICD-10-CM

## 2019-12-19 DIAGNOSIS — E78.5 HYPERLIPIDEMIA, UNSPECIFIED HYPERLIPIDEMIA TYPE: Primary | ICD-10-CM

## 2019-12-19 DIAGNOSIS — F52.21 ED (ERECTILE DYSFUNCTION) OF NON-ORGANIC ORIGIN: ICD-10-CM

## 2019-12-28 LAB
ALBUMIN SERPL-MCNC: 4.2 G/DL (ref 3.5–5.2)
ALBUMIN/GLOB SERPL: 1.8 G/DL
ALP SERPL-CCNC: 91 U/L (ref 39–117)
ALT SERPL-CCNC: 25 U/L (ref 1–41)
AST SERPL-CCNC: 14 U/L (ref 1–40)
BASOPHILS # BLD AUTO: 0.05 10*3/MM3 (ref 0–0.2)
BASOPHILS NFR BLD AUTO: 0.7 % (ref 0–1.5)
BILIRUB SERPL-MCNC: 0.6 MG/DL (ref 0.2–1.2)
BUN SERPL-MCNC: 10 MG/DL (ref 8–23)
BUN/CREAT SERPL: 11.2 (ref 7–25)
CALCIUM SERPL-MCNC: 8.7 MG/DL (ref 8.6–10.5)
CHLORIDE SERPL-SCNC: 101 MMOL/L (ref 98–107)
CHOLEST SERPL-MCNC: 147 MG/DL (ref 0–200)
CO2 SERPL-SCNC: 28.2 MMOL/L (ref 22–29)
CREAT SERPL-MCNC: 0.89 MG/DL (ref 0.76–1.27)
EOSINOPHIL # BLD AUTO: 0.24 10*3/MM3 (ref 0–0.4)
EOSINOPHIL NFR BLD AUTO: 3.1 % (ref 0.3–6.2)
ERYTHROCYTE [DISTWIDTH] IN BLOOD BY AUTOMATED COUNT: 13.8 % (ref 12.3–15.4)
GLOBULIN SER CALC-MCNC: 2.4 GM/DL
GLUCOSE SERPL-MCNC: 107 MG/DL (ref 65–99)
HBA1C MFR BLD: 6.1 % (ref 4.8–5.6)
HCT VFR BLD AUTO: 46.8 % (ref 37.5–51)
HDLC SERPL-MCNC: 41 MG/DL (ref 40–60)
HGB BLD-MCNC: 16.2 G/DL (ref 13–17.7)
IMM GRANULOCYTES # BLD AUTO: 0.04 10*3/MM3 (ref 0–0.05)
IMM GRANULOCYTES NFR BLD AUTO: 0.5 % (ref 0–0.5)
LDLC SERPL CALC-MCNC: 79 MG/DL (ref 0–100)
LDLC/HDLC SERPL: 1.94 {RATIO}
LYMPHOCYTES # BLD AUTO: 1.57 10*3/MM3 (ref 0.7–3.1)
LYMPHOCYTES NFR BLD AUTO: 20.5 % (ref 19.6–45.3)
MCH RBC QN AUTO: 30.6 PG (ref 26.6–33)
MCHC RBC AUTO-ENTMCNC: 34.6 G/DL (ref 31.5–35.7)
MCV RBC AUTO: 88.3 FL (ref 79–97)
MONOCYTES # BLD AUTO: 0.52 10*3/MM3 (ref 0.1–0.9)
MONOCYTES NFR BLD AUTO: 6.8 % (ref 5–12)
NEUTROPHILS # BLD AUTO: 5.22 10*3/MM3 (ref 1.7–7)
NEUTROPHILS NFR BLD AUTO: 68.4 % (ref 42.7–76)
NRBC BLD AUTO-RTO: 0 /100 WBC (ref 0–0.2)
PLATELET # BLD AUTO: 170 10*3/MM3 (ref 140–450)
POTASSIUM SERPL-SCNC: 3.9 MMOL/L (ref 3.5–5.2)
PROT SERPL-MCNC: 6.6 G/DL (ref 6–8.5)
RBC # BLD AUTO: 5.3 10*6/MM3 (ref 4.14–5.8)
SODIUM SERPL-SCNC: 143 MMOL/L (ref 136–145)
TESTOST FREE SERPL-MCNC: 8.4 PG/ML (ref 6.6–18.1)
TESTOST SERPL-MCNC: 233 NG/DL (ref 264–916)
TRIGL SERPL-MCNC: 133 MG/DL (ref 0–150)
TSH SERPL DL<=0.005 MIU/L-ACNC: 1.39 UIU/ML (ref 0.27–4.2)
VLDLC SERPL CALC-MCNC: 26.6 MG/DL
WBC # BLD AUTO: 7.64 10*3/MM3 (ref 3.4–10.8)

## 2020-01-03 ENCOUNTER — OFFICE VISIT (OUTPATIENT)
Dept: FAMILY MEDICINE CLINIC | Facility: CLINIC | Age: 65
End: 2020-01-03

## 2020-01-03 VITALS
HEIGHT: 72 IN | DIASTOLIC BLOOD PRESSURE: 86 MMHG | SYSTOLIC BLOOD PRESSURE: 132 MMHG | TEMPERATURE: 98.6 F | OXYGEN SATURATION: 98 % | RESPIRATION RATE: 18 BRPM | HEART RATE: 71 BPM | WEIGHT: 273.4 LBS | BODY MASS INDEX: 37.03 KG/M2

## 2020-01-03 DIAGNOSIS — E78.5 HYPERLIPIDEMIA, UNSPECIFIED HYPERLIPIDEMIA TYPE: ICD-10-CM

## 2020-01-03 DIAGNOSIS — R73.03 PREDIABETES: Primary | ICD-10-CM

## 2020-01-03 DIAGNOSIS — I10 ESSENTIAL HYPERTENSION: ICD-10-CM

## 2020-01-03 DIAGNOSIS — G47.33 OBSTRUCTIVE APNEA: ICD-10-CM

## 2020-01-03 PROBLEM — E66.01 CLASS 2 SEVERE OBESITY DUE TO EXCESS CALORIES WITH SERIOUS COMORBIDITY IN ADULT: Status: ACTIVE | Noted: 2020-01-03

## 2020-01-03 PROBLEM — E66.812 CLASS 2 SEVERE OBESITY DUE TO EXCESS CALORIES WITH SERIOUS COMORBIDITY IN ADULT: Status: ACTIVE | Noted: 2020-01-03

## 2020-01-03 PROCEDURE — 99214 OFFICE O/P EST MOD 30 MIN: CPT | Performed by: INTERNAL MEDICINE

## 2020-01-03 NOTE — PROGRESS NOTES
Subjective   Jose Ramon Murcia is a 64 y.o. male. Patient is here today for   Chief Complaint   Patient presents with   • Hypertension          Vitals:    01/03/20 0811   BP: 132/86   Pulse: 71   Resp: 18   Temp: 98.6 °F (37 °C)   SpO2: 98%     Body mass index is 37.07 kg/m².      Past Medical History:   Diagnosis Date   • Abnormal EKG     IN PAST   • Allergic    • Arthritis    • ED (erectile dysfunction)    • GERD (gastroesophageal reflux disease)    • Hypertension     IN PAST  NO MEDS   • Kidney stone    • Narcolepsy    • PONV (postoperative nausea and vomiting)     1969 AND 1998   • Sleep apnea       Allergies   Allergen Reactions   • Clarithromycin Other (See Comments)     Unable to eat    • Sulfa Antibiotics Myalgia      Social History     Socioeconomic History   • Marital status:      Spouse name: Not on file   • Number of children: Not on file   • Years of education: Not on file   • Highest education level: Not on file   Tobacco Use   • Smoking status: Never Smoker   • Smokeless tobacco: Never Used   Substance and Sexual Activity   • Alcohol use: No     Frequency: Never     Comment: social   • Drug use: No   • Sexual activity: Defer        Current Outpatient Medications:   •  amphetamine-dextroamphetamine (ADDERALL) 20 MG tablet, Take 20 mg by mouth Daily. Take 2 tablets by mouth every morning and 1 tablet by mouth at noon, Disp: , Rfl:   •  azelastine (ASTELIN) 0.1 % nasal spray, 1 spray into the nostril(s) as directed by provider Every Night. Use in each nostril as directed, Disp: 1 each, Rfl: 5  •  esomeprazole (nexIUM) 40 MG capsule, Take 40 mg by mouth 2 (Two) Times a Day., Disp: , Rfl:   •  fluticasone (FLONASE) 50 MCG/ACT nasal spray, 2 sprays into the nostril(s) as directed by provider Every Night., Disp: , Rfl:      Objective     Today to follow-up on hypertension.    He has no complaints.       Review of Systems   Constitutional: Negative.    HENT: Negative.    Respiratory: Negative.     Cardiovascular: Negative.    Musculoskeletal: Negative.    Psychiatric/Behavioral: Negative.        Physical Exam   Constitutional: He is oriented to person, place, and time. He appears well-developed and well-nourished.   Pleasant, neatly groomed, BMI 37.   HENT:   Head: Normocephalic.   Cardiovascular: Normal rate, regular rhythm and normal heart sounds.   Pulmonary/Chest: Effort normal and breath sounds normal.   Neurological: He is alert and oriented to person, place, and time.   Psychiatric: He has a normal mood and affect. His behavior is normal. Thought content normal.   Nursing note and vitals reviewed.        Problem List Items Addressed This Visit        Cardiovascular and Mediastinum    BP (high blood pressure)    Hyperlipidemia       Respiratory    Obstructive apnea       Other    Prediabetes - Primary            PLAN  He has prediabetes.  He has begun making an effort to lose weight.  He is got a  he is going to the gym, and he is adjusted his diet in an effort to lose weight.  He is lost about 9 pounds since September.    Blood pressure is marginal today.  I considered starting him on a blood pressure medication though he appeared to be apprehensive.  I asked him to follow-up in about 6 months to see how he is done a weight loss to see if that has had a favorable impact on decreasing his blood pressure.  If his blood pressure at that time is not less than 130/80 and I started him on lisinopril probably in combination with hydrochlorothiazide.    Obstructive sleep apnea.  He is compliant with use of CPAP.    I asked him to follow-up in 6 months.  Lab work prior to that visit should include lipid profile, comprehensive metabolic panel, hemoglobin A1c, urinalysis.  No follow-ups on file.

## 2020-01-22 RX ORDER — AZELASTINE 1 MG/ML
SPRAY, METERED NASAL
Qty: 30 ML | Refills: 1 | Status: SHIPPED | OUTPATIENT
Start: 2020-01-22 | End: 2021-04-28 | Stop reason: SDUPTHER

## 2020-06-17 DIAGNOSIS — R73.03 PREDIABETES: Primary | ICD-10-CM

## 2020-06-17 DIAGNOSIS — E78.5 HYPERLIPIDEMIA, UNSPECIFIED HYPERLIPIDEMIA TYPE: ICD-10-CM

## 2020-06-17 DIAGNOSIS — I10 ESSENTIAL HYPERTENSION: ICD-10-CM

## 2020-07-18 LAB
ALBUMIN SERPL-MCNC: 4.5 G/DL (ref 3.5–5.2)
ALBUMIN/GLOB SERPL: 2 G/DL
ALP SERPL-CCNC: 83 U/L (ref 39–117)
ALT SERPL-CCNC: 28 U/L (ref 1–41)
APPEARANCE UR: CLEAR
AST SERPL-CCNC: 14 U/L (ref 1–40)
BACTERIA #/AREA URNS HPF: NORMAL /HPF
BILIRUB SERPL-MCNC: 0.8 MG/DL (ref 0–1.2)
BILIRUB UR QL STRIP: NEGATIVE
BUN SERPL-MCNC: 13 MG/DL (ref 8–23)
BUN/CREAT SERPL: 15.1 (ref 7–25)
CALCIUM SERPL-MCNC: 9.1 MG/DL (ref 8.6–10.5)
CASTS URNS MICRO: NORMAL
CHLORIDE SERPL-SCNC: 100 MMOL/L (ref 98–107)
CHOLEST SERPL-MCNC: 182 MG/DL (ref 0–200)
CO2 SERPL-SCNC: 30.1 MMOL/L (ref 22–29)
COLOR UR: YELLOW
CREAT SERPL-MCNC: 0.86 MG/DL (ref 0.76–1.27)
EPI CELLS #/AREA URNS HPF: NORMAL /HPF
GLOBULIN SER CALC-MCNC: 2.2 GM/DL
GLUCOSE SERPL-MCNC: 107 MG/DL (ref 65–99)
GLUCOSE UR QL: NEGATIVE
HBA1C MFR BLD: 5.7 % (ref 4.8–5.6)
HDLC SERPL-MCNC: 48 MG/DL (ref 40–60)
HGB UR QL STRIP: NEGATIVE
KETONES UR QL STRIP: NEGATIVE
LDLC SERPL CALC-MCNC: 101 MG/DL (ref 0–100)
LDLC/HDLC SERPL: 2.11 {RATIO}
LEUKOCYTE ESTERASE UR QL STRIP: NEGATIVE
NITRITE UR QL STRIP: NEGATIVE
PH UR STRIP: 7.5 [PH] (ref 5–8)
POTASSIUM SERPL-SCNC: 4.3 MMOL/L (ref 3.5–5.2)
PROT SERPL-MCNC: 6.7 G/DL (ref 6–8.5)
PROT UR QL STRIP: NEGATIVE
RBC #/AREA URNS HPF: NORMAL /HPF
SODIUM SERPL-SCNC: 138 MMOL/L (ref 136–145)
SP GR UR: 1.01 (ref 1–1.03)
TRIGL SERPL-MCNC: 164 MG/DL (ref 0–150)
UROBILINOGEN UR STRIP-MCNC: NORMAL MG/DL
VLDLC SERPL CALC-MCNC: 32.8 MG/DL
WBC #/AREA URNS HPF: NORMAL /HPF

## 2020-07-31 ENCOUNTER — OFFICE VISIT (OUTPATIENT)
Dept: FAMILY MEDICINE CLINIC | Facility: CLINIC | Age: 65
End: 2020-07-31

## 2020-07-31 VITALS
HEIGHT: 72 IN | HEART RATE: 79 BPM | SYSTOLIC BLOOD PRESSURE: 142 MMHG | BODY MASS INDEX: 36.3 KG/M2 | DIASTOLIC BLOOD PRESSURE: 86 MMHG | OXYGEN SATURATION: 98 % | RESPIRATION RATE: 18 BRPM | TEMPERATURE: 97.8 F | WEIGHT: 268 LBS

## 2020-07-31 DIAGNOSIS — G47.33 OBSTRUCTIVE APNEA: ICD-10-CM

## 2020-07-31 DIAGNOSIS — R73.03 PREDIABETES: ICD-10-CM

## 2020-07-31 DIAGNOSIS — E66.01 CLASS 2 SEVERE OBESITY DUE TO EXCESS CALORIES WITH SERIOUS COMORBIDITY AND BODY MASS INDEX (BMI) OF 36.0 TO 36.9 IN ADULT (HCC): Primary | ICD-10-CM

## 2020-07-31 DIAGNOSIS — I10 ESSENTIAL HYPERTENSION: ICD-10-CM

## 2020-07-31 PROCEDURE — 99214 OFFICE O/P EST MOD 30 MIN: CPT | Performed by: INTERNAL MEDICINE

## 2020-07-31 RX ORDER — BRIMONIDINE TARTRATE AND TIMOLOL MALEATE 2; 5 MG/ML; MG/ML
1 SOLUTION OPHTHALMIC EVERY 12 HOURS
COMMUNITY

## 2020-07-31 RX ORDER — VALSARTAN 80 MG/1
80 TABLET ORAL DAILY
Qty: 90 TABLET | Refills: 1 | Status: SHIPPED | OUTPATIENT
Start: 2020-07-31 | End: 2020-10-01

## 2020-08-09 NOTE — PROGRESS NOTES
Subjective   Jose Ramon Murcia is a 65 y.o. male. Patient is here today for   Chief Complaint   Patient presents with   • Prediabetes     HTN- PT HERE FOR FOLLOW UP ON LABS          Vitals:    07/31/20 1306   BP: 142/86   Pulse: 79   Resp: 18   Temp: 97.8 °F (36.6 °C)   SpO2: 98%     Body mass index is 36.34 kg/m².      Past Medical History:   Diagnosis Date   • Abnormal EKG     IN PAST   • Allergic    • Arthritis    • ED (erectile dysfunction)    • GERD (gastroesophageal reflux disease)    • Hypertension     IN PAST  NO MEDS   • Kidney stone    • Narcolepsy    • PONV (postoperative nausea and vomiting)     1969 AND 1998   • Sleep apnea       Allergies   Allergen Reactions   • Clarithromycin Other (See Comments)     Unable to eat    • Sulfa Antibiotics Myalgia      Social History     Socioeconomic History   • Marital status:      Spouse name: Not on file   • Number of children: Not on file   • Years of education: Not on file   • Highest education level: Not on file   Tobacco Use   • Smoking status: Never Smoker   • Smokeless tobacco: Never Used   Substance and Sexual Activity   • Alcohol use: No     Frequency: Never     Comment: social   • Drug use: No   • Sexual activity: Defer        Current Outpatient Medications:   •  amphetamine-dextroamphetamine (ADDERALL) 20 MG tablet, Take 20 mg by mouth Daily. Take 2 tablets by mouth every morning and 1 tablet by mouth at noon, Disp: , Rfl:   •  azelastine (ASTELIN) 0.1 % nasal spray, SPRAY ONCE IN EACH NOSTRIL TWICE DAILY, Disp: 30 mL, Rfl: 1  •  brimonidine-timolol (COMBIGAN) 0.2-0.5 % ophthalmic solution, 1 drop Every 12 (Twelve) Hours., Disp: , Rfl:   •  fluticasone (FLONASE) 50 MCG/ACT nasal spray, 2 sprays into the nostril(s) as directed by provider Every Night., Disp: , Rfl:   •  esomeprazole (nexIUM) 40 MG capsule, Take 40 mg by mouth 2 (Two) Times a Day., Disp: , Rfl:   •  valsartan (Diovan) 80 MG tablet, Take 1 tablet by mouth Daily., Disp: 90 tablet,  Rfl: 1     Objective     He is here today to follow-up on labs.  He has no complaints.    BMI of 36.3.       Review of Systems   Constitutional: Negative.    HENT: Negative.    Respiratory: Negative.    Cardiovascular: Negative.    Musculoskeletal: Negative.    Hematological: Negative.        Physical Exam   Constitutional: He appears well-developed and well-nourished.   Pleasant, neatly groomed, in no distress.   HENT:   Head: Normocephalic.   Cardiovascular: Normal rate, regular rhythm and normal heart sounds.   Pulmonary/Chest: Effort normal and breath sounds normal.   Neurological: He is alert.   Psychiatric: He has a normal mood and affect. His behavior is normal. Thought content normal.   Nursing note and vitals reviewed.        Problem List Items Addressed This Visit        Cardiovascular and Mediastinum    BP (high blood pressure)    Relevant Medications    valsartan (Diovan) 80 MG tablet       Respiratory    Obstructive apnea       Digestive    Class 2 severe obesity due to excess calories with serious comorbidity in adult (CMS/[facility]) - Primary       Endocrine    Prediabetes            PLAN  His blood pressure is too high.  When I have him start taking valsartan 80 mg daily.  I would like to have him back in 4 to 6 weeks to recheck his blood pressure.    He has obstructive sleep apnea and is compliant with use of CPAP.    He has severe obesity.  He is prediabetic.  He and I discussed the importance of weight loss via regular aerobic activity decrease caloric intake.  No follow-ups on file.

## 2020-10-01 ENCOUNTER — OFFICE VISIT (OUTPATIENT)
Dept: FAMILY MEDICINE CLINIC | Facility: CLINIC | Age: 65
End: 2020-10-01

## 2020-10-01 VITALS
DIASTOLIC BLOOD PRESSURE: 80 MMHG | OXYGEN SATURATION: 97 % | WEIGHT: 272 LBS | BODY MASS INDEX: 36.84 KG/M2 | HEART RATE: 74 BPM | TEMPERATURE: 98 F | SYSTOLIC BLOOD PRESSURE: 132 MMHG | HEIGHT: 72 IN | RESPIRATION RATE: 16 BRPM

## 2020-10-01 DIAGNOSIS — G47.33 OBSTRUCTIVE APNEA: ICD-10-CM

## 2020-10-01 DIAGNOSIS — Z23 NEED FOR IMMUNIZATION AGAINST INFLUENZA: Primary | ICD-10-CM

## 2020-10-01 DIAGNOSIS — E66.01 CLASS 2 SEVERE OBESITY DUE TO EXCESS CALORIES WITH SERIOUS COMORBIDITY AND BODY MASS INDEX (BMI) OF 36.0 TO 36.9 IN ADULT (HCC): ICD-10-CM

## 2020-10-01 DIAGNOSIS — I10 ESSENTIAL HYPERTENSION: ICD-10-CM

## 2020-10-01 PROCEDURE — 99214 OFFICE O/P EST MOD 30 MIN: CPT | Performed by: INTERNAL MEDICINE

## 2020-10-01 PROCEDURE — 90471 IMMUNIZATION ADMIN: CPT | Performed by: INTERNAL MEDICINE

## 2020-10-01 PROCEDURE — 90686 IIV4 VACC NO PRSV 0.5 ML IM: CPT | Performed by: INTERNAL MEDICINE

## 2020-10-01 RX ORDER — VALSARTAN 160 MG/1
160 TABLET ORAL DAILY
Qty: 90 TABLET | Refills: 3 | Status: SHIPPED | OUTPATIENT
Start: 2020-10-01 | End: 2020-10-21 | Stop reason: SDUPTHER

## 2020-10-09 NOTE — PROGRESS NOTES
Subjective   Jose Ramon Murcia is a 65 y.o. male. Patient is here today for   Chief Complaint   Patient presents with   • Hypertension          Vitals:    10/01/20 1259   BP: 132/80   Pulse: 74   Resp: 16   Temp: 98 °F (36.7 °C)   SpO2: 97%     Body mass index is 36.88 kg/m².      Past Medical History:   Diagnosis Date   • Abnormal EKG     IN PAST   • Allergic    • Arthritis    • ED (erectile dysfunction)    • GERD (gastroesophageal reflux disease)    • Hypertension     IN PAST  NO MEDS   • Kidney stone    • Narcolepsy    • PONV (postoperative nausea and vomiting)     1969 AND 1998   • Sleep apnea       Allergies   Allergen Reactions   • Clarithromycin Other (See Comments)     Unable to eat    • Sulfa Antibiotics Myalgia      Social History     Socioeconomic History   • Marital status:      Spouse name: Not on file   • Number of children: Not on file   • Years of education: Not on file   • Highest education level: Not on file   Tobacco Use   • Smoking status: Never Smoker   • Smokeless tobacco: Never Used   Substance and Sexual Activity   • Alcohol use: No     Frequency: Never     Comment: social   • Drug use: No   • Sexual activity: Defer        Current Outpatient Medications:   •  amphetamine-dextroamphetamine (ADDERALL) 20 MG tablet, Take 20 mg by mouth Daily. Take 2 tablets by mouth every morning and 1 tablet by mouth at noon, Disp: , Rfl:   •  azelastine (ASTELIN) 0.1 % nasal spray, SPRAY ONCE IN EACH NOSTRIL TWICE DAILY, Disp: 30 mL, Rfl: 1  •  brimonidine-timolol (COMBIGAN) 0.2-0.5 % ophthalmic solution, 1 drop Every 12 (Twelve) Hours., Disp: , Rfl:   •  esomeprazole (nexIUM) 40 MG capsule, Take 40 mg by mouth 2 (Two) Times a Day., Disp: , Rfl:   •  fluticasone (FLONASE) 50 MCG/ACT nasal spray, 2 sprays into the nostril(s) as directed by provider Every Night., Disp: , Rfl:   •  valsartan (Diovan) 160 MG tablet, Take 1 tablet by mouth Daily., Disp: 90 tablet, Rfl: 3     Objective     Here today to  follow-up on hypertension.    He needs a flu shot today.    Hypertension         Review of Systems   Constitutional: Negative.    HENT: Negative.    Respiratory: Negative.    Cardiovascular: Negative.    Musculoskeletal: Negative.    Psychiatric/Behavioral: Negative.        Physical Exam  Vitals signs and nursing note reviewed.   Constitutional:       Appearance: Normal appearance. He is obese. He is not ill-appearing.      Comments: Pleasant, neatly groomed, no distress.   Cardiovascular:      Rate and Rhythm: Normal rate and regular rhythm.      Heart sounds: Normal heart sounds. No murmur. No gallop.    Neurological:      Mental Status: He is alert and oriented to person, place, and time.   Psychiatric:         Mood and Affect: Mood normal.         Behavior: Behavior normal.         Thought Content: Thought content normal.           Problem List Items Addressed This Visit        Cardiovascular and Mediastinum    BP (high blood pressure)    Relevant Medications    valsartan (Diovan) 160 MG tablet       Respiratory    Obstructive apnea       Digestive    Class 2 severe obesity due to excess calories with serious comorbidity in adult (CMS/[facility])      Other Visit Diagnoses     Need for immunization against influenza    -  Primary    Relevant Orders    Fluarix/Fluzone/Afluria Quad>6 Months (Completed)            PLAN  His hypertension is relatively well controlled.  I told him that his target blood pressure is to get his systolic blood pressure less than 130 and his diastolic blood pressure less than 80.    Would like to have him back in 3 to 4 months to recheck his blood pressure.    I have encouraged him to lose weight.    He tells me he is compliant with use of CPAP.  No follow-ups on file.

## 2020-10-22 RX ORDER — VALSARTAN 160 MG/1
160 TABLET ORAL 2 TIMES DAILY
Qty: 180 TABLET | Refills: 3 | Status: SHIPPED | OUTPATIENT
Start: 2020-10-22 | End: 2020-10-23 | Stop reason: SDUPTHER

## 2020-10-23 NOTE — TELEPHONE ENCOUNTER
Caller: Jose Rmaon Murcia    Relationship: Self    Best call back number: [phone]    Medication needed:   Requested Prescriptions     Pending Prescriptions Disp Refills   • valsartan (Diovan) 160 MG tablet 180 tablet 3     Sig: Take 1 tablet by mouth 2 (Two) Times a Day.       When do you need the refill by: ASAP    What details did the patient provide when requesting the medication: PT IS OUT OF MEDICATION     Does the patient have less than a 3 day supply:  [x] Yes  [] No    What is the patient's preferred pharmacy: [facility] #[id number] [address] AT [facility] - [phone] [facility] [phone] FX

## 2020-10-26 RX ORDER — VALSARTAN 160 MG/1
160 TABLET ORAL 2 TIMES DAILY
Qty: 180 TABLET | Refills: 3 | Status: SHIPPED | OUTPATIENT
Start: 2020-10-26 | End: 2021-01-04 | Stop reason: DRUGHIGH

## 2020-10-26 NOTE — TELEPHONE ENCOUNTER
PATIENT CALLED BACK AND STATED THAT PHARMACY HAS NOT RECEIVED REQUESTED FOR PRESCRIPTION.    WAS AT [facility] THIS MORNING.    PLEASE ADVISE  [phone]

## 2020-11-02 ENCOUNTER — OFFICE VISIT (OUTPATIENT)
Dept: ORTHOPEDIC SURGERY | Facility: CLINIC | Age: 65
End: 2020-11-02

## 2020-11-02 VITALS — WEIGHT: 260 LBS | TEMPERATURE: 98.2 F | BODY MASS INDEX: 33.37 KG/M2 | HEIGHT: 74 IN

## 2020-11-02 DIAGNOSIS — M25.512 LEFT SHOULDER PAIN, UNSPECIFIED CHRONICITY: Primary | ICD-10-CM

## 2020-11-02 PROCEDURE — 99213 OFFICE O/P EST LOW 20 MIN: CPT | Performed by: ORTHOPAEDIC SURGERY
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
## 2020-11-16 ENCOUNTER — TELEPHONE (OUTPATIENT)
Dept: FAMILY MEDICINE CLINIC | Facility: CLINIC | Age: 65
End: 2020-11-16

## 2020-11-16 NOTE — TELEPHONE ENCOUNTER
CALLED PT AND ADVISED PT WOULD NEED TO BE SEEN AT THE OFFICE BY DR OR ANOTHER /APRN AT THE OFFICE FOR A HOSPITAL FU PRIOR TO HIS APPT IN January AND IN ORDER FOR DR TO CONTINUE CARE/REFILL MEDICATIONS.  PT ADVISED HIS SPECIALIST WOULD BE HANDLING THE MED REFILLS AND DOES NOT WANT TO MAKE AN APPOINTMENT AT THIS TIME.  PT ADVISED HE WOULD CB AND SCHEDULE APPT AFTER SPEAKING WITH SPECIALIST ABOUT MEDICATION REFILLS.

## 2020-11-16 NOTE — TELEPHONE ENCOUNTER
Patient wanted to report that he was admitted to [facility] on 11/12/2020 and discharged on 11/15/2020 for a stroke. He states he has an appointment on 1/4/2021 and would like to know if he can wait till then to be seen. Patient states he has had a medication change as well. He is now taking:  - Lipitor 80mg one by mouth nightly  - Baby Aspirin 1mg at bedtime  - Plavix take one daily for three weeks (then after that stop medicine)  He states he will be following up with Dr Mena Harden. He will also be doing physical, speech and occupational therapies as well at [facility] by [facility].    Patient can be reached at [phone].

## 2020-11-18 ENCOUNTER — TELEPHONE (OUTPATIENT)
Dept: FAMILY MEDICINE CLINIC | Facility: CLINIC | Age: 65
End: 2020-11-18

## 2020-11-18 NOTE — TELEPHONE ENCOUNTER
Pts wife called stating that the patient believes he was taking 80mg of valsartan and believed that Dr. Urena doubled it to 160mg once daily. On the patients new prescription it is 160mg twice daily. They are needing clarification on what he should really take.

## 2020-11-19 NOTE — TELEPHONE ENCOUNTER
Pt wife calling back because she hasn't heard back about her question from yesterday. Please call back and advise at [phone].    Pts wife called stating that the patient believes he was taking 80mg of valsartan and believed that Dr. Urena doubled it to 160mg once daily. On the patients new prescription it is 160mg twice daily. They are needing clarification on what he should really take. Pt just had a stroke and it was based on his BP so that's why she is so concerned.

## 2020-12-14 DIAGNOSIS — R73.03 PREDIABETES: Primary | ICD-10-CM

## 2020-12-14 DIAGNOSIS — I10 ESSENTIAL HYPERTENSION: ICD-10-CM

## 2020-12-14 DIAGNOSIS — Z12.5 ENCOUNTER FOR SCREENING FOR MALIGNANT NEOPLASM OF PROSTATE: ICD-10-CM

## 2020-12-14 DIAGNOSIS — E78.5 HYPERLIPIDEMIA, UNSPECIFIED HYPERLIPIDEMIA TYPE: ICD-10-CM

## 2020-12-30 LAB
ALBUMIN SERPL-MCNC: 4.2 G/DL (ref 3.5–5.2)
ALBUMIN/GLOB SERPL: 1.7 G/DL
ALP SERPL-CCNC: 102 U/L (ref 39–117)
ALT SERPL-CCNC: 34 U/L (ref 1–41)
APPEARANCE UR: CLEAR
AST SERPL-CCNC: 16 U/L (ref 1–40)
BACTERIA #/AREA URNS HPF: ABNORMAL /HPF
BASOPHILS # BLD AUTO: 0.04 10*3/MM3 (ref 0–0.2)
BASOPHILS NFR BLD AUTO: 0.5 % (ref 0–1.5)
BILIRUB SERPL-MCNC: 0.6 MG/DL (ref 0–1.2)
BILIRUB UR QL STRIP: NEGATIVE
BUN SERPL-MCNC: 15 MG/DL (ref 8–23)
BUN/CREAT SERPL: 18.5 (ref 7–25)
CALCIUM SERPL-MCNC: 8.9 MG/DL (ref 8.6–10.5)
CHLORIDE SERPL-SCNC: 103 MMOL/L (ref 98–107)
CHOLEST SERPL-MCNC: 116 MG/DL (ref 0–200)
CO2 SERPL-SCNC: 29.2 MMOL/L (ref 22–29)
COLOR UR: YELLOW
CREAT SERPL-MCNC: 0.81 MG/DL (ref 0.76–1.27)
EOSINOPHIL # BLD AUTO: 0.16 10*3/MM3 (ref 0–0.4)
EOSINOPHIL NFR BLD AUTO: 2.1 % (ref 0.3–6.2)
EPI CELLS #/AREA URNS HPF: ABNORMAL /HPF
ERYTHROCYTE [DISTWIDTH] IN BLOOD BY AUTOMATED COUNT: 13.2 % (ref 12.3–15.4)
GLOBULIN SER CALC-MCNC: 2.5 GM/DL
GLUCOSE SERPL-MCNC: 125 MG/DL (ref 65–99)
GLUCOSE UR QL: NEGATIVE
HBA1C MFR BLD: 5.8 % (ref 4.8–5.6)
HCT VFR BLD AUTO: 46.3 % (ref 37.5–51)
HDLC SERPL-MCNC: 45 MG/DL (ref 40–60)
HGB BLD-MCNC: 15.6 G/DL (ref 13–17.7)
HGB UR QL STRIP: NEGATIVE
IMM GRANULOCYTES # BLD AUTO: 0.01 10*3/MM3 (ref 0–0.05)
IMM GRANULOCYTES NFR BLD AUTO: 0.1 % (ref 0–0.5)
KETONES UR QL STRIP: NEGATIVE
LDLC SERPL CALC-MCNC: 53 MG/DL (ref 0–100)
LDLC/HDLC SERPL: 1.15 {RATIO}
LEUKOCYTE ESTERASE UR QL STRIP: NEGATIVE
LYMPHOCYTES # BLD AUTO: 1.51 10*3/MM3 (ref 0.7–3.1)
LYMPHOCYTES NFR BLD AUTO: 19.8 % (ref 19.6–45.3)
MCH RBC QN AUTO: 31 PG (ref 26.6–33)
MCHC RBC AUTO-ENTMCNC: 33.7 G/DL (ref 31.5–35.7)
MCV RBC AUTO: 92 FL (ref 79–97)
MONOCYTES # BLD AUTO: 0.59 10*3/MM3 (ref 0.1–0.9)
MONOCYTES NFR BLD AUTO: 7.7 % (ref 5–12)
NEUTROPHILS # BLD AUTO: 5.32 10*3/MM3 (ref 1.7–7)
NEUTROPHILS NFR BLD AUTO: 69.8 % (ref 42.7–76)
NITRITE UR QL STRIP: NEGATIVE
NRBC BLD AUTO-RTO: 0 /100 WBC (ref 0–0.2)
PH UR STRIP: 6 [PH] (ref 5–8)
PLATELET # BLD AUTO: 167 10*3/MM3 (ref 140–450)
POTASSIUM SERPL-SCNC: 5 MMOL/L (ref 3.5–5.2)
PROT SERPL-MCNC: 6.7 G/DL (ref 6–8.5)
PROT UR QL STRIP: NEGATIVE
PSA SERPL-MCNC: 0.58 NG/ML (ref 0–4)
RBC # BLD AUTO: 5.03 10*6/MM3 (ref 4.14–5.8)
RBC #/AREA URNS HPF: ABNORMAL /HPF
SODIUM SERPL-SCNC: 140 MMOL/L (ref 136–145)
SP GR UR: 1.03 (ref 1–1.03)
TRIGL SERPL-MCNC: 97 MG/DL (ref 0–150)
UROBILINOGEN UR STRIP-MCNC: NORMAL MG/DL
VLDLC SERPL CALC-MCNC: 18 MG/DL (ref 5–40)
WBC # BLD AUTO: 7.63 10*3/MM3 (ref 3.4–10.8)
WBC #/AREA URNS HPF: ABNORMAL /HPF

## 2021-01-04 ENCOUNTER — OFFICE VISIT (OUTPATIENT)
Dept: FAMILY MEDICINE CLINIC | Facility: CLINIC | Age: 66
End: 2021-01-04

## 2021-01-04 VITALS
TEMPERATURE: 97.1 F | BODY MASS INDEX: 35.27 KG/M2 | RESPIRATION RATE: 18 BRPM | DIASTOLIC BLOOD PRESSURE: 78 MMHG | OXYGEN SATURATION: 99 % | HEIGHT: 74 IN | SYSTOLIC BLOOD PRESSURE: 134 MMHG | HEART RATE: 78 BPM | WEIGHT: 274.8 LBS

## 2021-01-04 DIAGNOSIS — I10 ESSENTIAL HYPERTENSION: Primary | ICD-10-CM

## 2021-01-04 DIAGNOSIS — E66.01 CLASS 2 SEVERE OBESITY DUE TO EXCESS CALORIES WITH SERIOUS COMORBIDITY AND BODY MASS INDEX (BMI) OF 35.0 TO 35.9 IN ADULT (HCC): ICD-10-CM

## 2021-01-04 DIAGNOSIS — R73.03 PREDIABETES: ICD-10-CM

## 2021-01-04 DIAGNOSIS — G47.33 OBSTRUCTIVE SLEEP APNEA: ICD-10-CM

## 2021-01-04 PROCEDURE — 99214 OFFICE O/P EST MOD 30 MIN: CPT | Performed by: INTERNAL MEDICINE

## 2021-01-04 RX ORDER — AMOXICILLIN 500 MG/1
1000 CAPSULE ORAL 3 TIMES DAILY
COMMUNITY
End: 2021-02-25

## 2021-01-04 RX ORDER — ASPIRIN 81 MG/1
81 TABLET, CHEWABLE ORAL DAILY
COMMUNITY
Start: 2020-11-16 | End: 2021-04-16

## 2021-01-04 RX ORDER — VALSARTAN 160 MG/1
160 TABLET ORAL DAILY
COMMUNITY
End: 2021-10-29

## 2021-01-04 RX ORDER — ATORVASTATIN CALCIUM 80 MG/1
80 TABLET, FILM COATED ORAL DAILY
COMMUNITY
Start: 2020-11-15 | End: 2021-01-25 | Stop reason: SDUPTHER

## 2021-01-04 RX ORDER — PITOLISANT HYDROCHLORIDE 17.8 MG/1
17.8 TABLET, FILM COATED ORAL
COMMUNITY
End: 2021-11-24

## 2021-01-24 NOTE — PROGRESS NOTES
Subjective   Jose Ramon Murcia is a 65 y.o. male. Patient is here today for   Chief Complaint   Patient presents with   • Hypertension     lab f/u.           Vitals:    01/04/21 1013   BP: 134/78   Pulse: 78   Resp: 18   Temp: 97.1 °F (36.2 °C)   SpO2: 99%     Body mass index is 35.27 kg/m².      Past Medical History:   Diagnosis Date   • Abnormal EKG     IN PAST   • Allergic    • Arthritis    • ED (erectile dysfunction)    • GERD (gastroesophageal reflux disease)    • Hypertension     IN PAST  NO MEDS   • Kidney stone    • Narcolepsy    • PONV (postoperative nausea and vomiting)     1969 AND 1998   • Sleep apnea       Allergies   Allergen Reactions   • Clarithromycin Other (See Comments)     Unable to eat    • Sulfa Antibiotics Myalgia      Social History     Socioeconomic History   • Marital status:      Spouse name: Not on file   • Number of children: Not on file   • Years of education: Not on file   • Highest education level: Not on file   Tobacco Use   • Smoking status: Never Smoker   • Smokeless tobacco: Never Used   Substance and Sexual Activity   • Alcohol use: No     Frequency: Never     Comment: social   • Drug use: No   • Sexual activity: Defer        Current Outpatient Medications:   •  amoxicillin (AMOXIL) 500 MG capsule, Take 1,000 mg by mouth 3 (Three) Times a Day., Disp: , Rfl:   •  amphetamine-dextroamphetamine (ADDERALL) 20 MG tablet, Take 20 mg by mouth Daily. Take 2 tablets by mouth every morning and 1 tablet by mouth at noon, Disp: , Rfl:   •  aspirin 81 MG chewable tablet, Chew 81 mg Daily., Disp: , Rfl:   •  atorvastatin (LIPITOR) 80 MG tablet, Take 80 mg by mouth Daily., Disp: , Rfl:   •  azelastine (ASTELIN) 0.1 % nasal spray, SPRAY ONCE IN EACH NOSTRIL TWICE DAILY, Disp: 30 mL, Rfl: 1  •  brimonidine-timolol (COMBIGAN) 0.2-0.5 % ophthalmic solution, 1 drop Every 12 (Twelve) Hours., Disp: , Rfl:   •  fluticasone (FLONASE) 50 MCG/ACT nasal spray, 2 sprays into the nostril(s) as  directed by provider Every Night., Disp: , Rfl:   •  Pitolisant HCl (Wakix) 17.8 MG tablet, Take 17.8 mg by mouth. Take 2 tablets every morning., Disp: , Rfl:   •  valsartan (DIOVAN) 160 MG tablet, Take 160 mg by mouth Daily., Disp: , Rfl:      Objective     He is here today to follow-up for labs done last.    He has no complaints.    Hypertension         Review of Systems   Constitutional: Negative.    HENT: Negative.    Respiratory: Negative.    Cardiovascular: Negative.    Musculoskeletal: Negative.    Psychiatric/Behavioral: Negative.        Physical Exam  Vitals signs and nursing note reviewed.   Constitutional:       Appearance: Normal appearance. He is obese. He is not ill-appearing or diaphoretic.      Comments: Pleasant, no number in no distress.   Neck:      Vascular: No carotid bruit.   Cardiovascular:      Rate and Rhythm: Regular rhythm.      Heart sounds: Normal heart sounds. No murmur. No gallop.    Pulmonary:      Effort: No respiratory distress.      Breath sounds: Normal breath sounds. No wheezing or rales.   Neurological:      Mental Status: He is alert and oriented to person, place, and time.   Psychiatric:         Mood and Affect: Mood normal.         Behavior: Behavior normal.         Thought Content: Thought content normal.           Problems Addressed this Visit        Cardiac and Vasculature    BP (high blood pressure) - Primary    Relevant Medications    valsartan (DIOVAN) 160 MG tablet       Endocrine and Metabolic    Prediabetes    Class 2 severe obesity due to excess calories with serious comorbidity in adult (CMS/[facility])       Sleep    Obstructive sleep apnea      Diagnoses       Codes Comments    Essential hypertension    -  Primary ICD-10-CM: I10  ICD-9-CM: 401.9     Prediabetes     ICD-10-CM: R73.03  ICD-9-CM: 790.29     Class 2 severe obesity due to excess calories with serious comorbidity and body mass index (BMI) of 35.0 to 35.9 in adult (CMS/[facility])     ICD-10-CM: E66.01,  Z68.35  ICD-9-CM: 278.01, V85.35     Obstructive sleep apnea     ICD-10-CM: G47.33  ICD-9-CM: 327.23             PLAN  His hypertension is relatively well controlled.    He has obstructive sleep apnea and is compliant with use of CPAP.    He is obese.  He is diabetic.  Kirst weight loss via regular daily and decrease caloric intake.    Exercise is difficult for him due to his chronic pain of his neck and knees cervical spinal stenosis and lumbar radiculopathy.    His hypercholesterolemia is well controlled on atorvastatin.    He should follow-up for a comprehensive physical exam once yearly.  Expect in about 6 months.  No follow-ups on file.

## 2021-01-26 RX ORDER — ATORVASTATIN CALCIUM 80 MG/1
80 TABLET, FILM COATED ORAL DAILY
Qty: 90 TABLET | Refills: 1 | Status: SHIPPED | OUTPATIENT
Start: 2021-01-26 | End: 2022-05-12

## 2021-02-24 ENCOUNTER — HOSPITAL ENCOUNTER (EMERGENCY)
Facility: HOSPITAL | Age: 66
Discharge: HOME | End: 2021-02-25
Attending: EMERGENCY MEDICINE | Admitting: EMERGENCY MEDICINE

## 2021-02-24 ENCOUNTER — APPOINTMENT (OUTPATIENT)
Dept: GENERAL RADIOLOGY | Facility: HOSPITAL | Age: 66
End: 2021-02-24

## 2021-02-24 DIAGNOSIS — R00.1 BRADYCARDIA: ICD-10-CM

## 2021-02-24 DIAGNOSIS — R53.1 GENERALIZED WEAKNESS: Primary | ICD-10-CM

## 2021-02-24 LAB
BASOPHILS # BLD AUTO: 0.05 10*3/MM3 (ref 0–0.2)
BASOPHILS NFR BLD AUTO: 0.6 % (ref 0–1.5)
DEPRECATED RDW RBC AUTO: 42.6 FL (ref 37–54)
EOSINOPHIL # BLD AUTO: 0.39 10*3/MM3 (ref 0–0.4)
EOSINOPHIL NFR BLD AUTO: 4.3 % (ref 0.3–6.2)
ERYTHROCYTE [DISTWIDTH] IN BLOOD BY AUTOMATED COUNT: 13.2 % (ref 12.3–15.4)
HCT VFR BLD AUTO: 45.8 % (ref 37.5–51)
HGB BLD-MCNC: 15.4 G/DL (ref 13–17.7)
IMM GRANULOCYTES # BLD AUTO: 0.03 10*3/MM3 (ref 0–0.05)
IMM GRANULOCYTES NFR BLD AUTO: 0.3 % (ref 0–0.5)
LYMPHOCYTES # BLD AUTO: 2.21 10*3/MM3 (ref 0.7–3.1)
LYMPHOCYTES NFR BLD AUTO: 24.6 % (ref 19.6–45.3)
MCH RBC QN AUTO: 29.8 PG (ref 26.6–33)
MCHC RBC AUTO-ENTMCNC: 33.6 G/DL (ref 31.5–35.7)
MCV RBC AUTO: 88.8 FL (ref 79–97)
MONOCYTES # BLD AUTO: 0.75 10*3/MM3 (ref 0.1–0.9)
MONOCYTES NFR BLD AUTO: 8.4 % (ref 5–12)
NEUTROPHILS NFR BLD AUTO: 5.55 10*3/MM3 (ref 1.7–7)
NEUTROPHILS NFR BLD AUTO: 61.8 % (ref 42.7–76)
NRBC BLD AUTO-RTO: 0 /100 WBC (ref 0–0.2)
PLATELET # BLD AUTO: 174 10*3/MM3 (ref 140–450)
PMV BLD AUTO: 10.9 FL (ref 6–12)
RBC # BLD AUTO: 5.16 10*6/MM3 (ref 4.14–5.8)
WBC # BLD AUTO: 8.98 10*3/MM3 (ref 3.4–10.8)

## 2021-02-24 PROCEDURE — 84484 ASSAY OF TROPONIN QUANT: CPT | Performed by: PHYSICIAN ASSISTANT

## 2021-02-24 PROCEDURE — 0202U NFCT DS 22 TRGT SARS-COV-2: CPT | Performed by: PHYSICIAN ASSISTANT

## 2021-02-24 PROCEDURE — 83605 ASSAY OF LACTIC ACID: CPT | Performed by: PHYSICIAN ASSISTANT

## 2021-02-24 PROCEDURE — 93005 ELECTROCARDIOGRAM TRACING: CPT | Performed by: PHYSICIAN ASSISTANT

## 2021-02-24 PROCEDURE — 36415 COLL VENOUS BLD VENIPUNCTURE: CPT

## 2021-02-24 PROCEDURE — 99284 EMERGENCY DEPT VISIT MOD MDM: CPT

## 2021-02-24 PROCEDURE — 85025 COMPLETE CBC W/AUTO DIFF WBC: CPT | Performed by: PHYSICIAN ASSISTANT

## 2021-02-24 PROCEDURE — 80053 COMPREHEN METABOLIC PANEL: CPT | Performed by: PHYSICIAN ASSISTANT

## 2021-02-24 PROCEDURE — 87040 BLOOD CULTURE FOR BACTERIA: CPT | Performed by: PHYSICIAN ASSISTANT

## 2021-02-24 PROCEDURE — 71045 X-RAY EXAM CHEST 1 VIEW: CPT

## 2021-02-24 PROCEDURE — 84145 PROCALCITONIN (PCT): CPT | Performed by: PHYSICIAN ASSISTANT

## 2021-02-24 PROCEDURE — 81003 URINALYSIS AUTO W/O SCOPE: CPT | Performed by: PHYSICIAN ASSISTANT

## 2021-02-24 RX ORDER — SODIUM CHLORIDE 0.9 % (FLUSH) 0.9 %
10 SYRINGE (ML) INJECTION AS NEEDED
Status: DISCONTINUED | OUTPATIENT
Start: 2021-02-24 | End: 2021-02-25 | Stop reason: HOSPADM

## 2021-02-24 RX ADMIN — SODIUM CHLORIDE, PRESERVATIVE FREE 10 ML: 5 INJECTION INTRAVENOUS at 23:25

## 2021-02-25 ENCOUNTER — TELEPHONE (OUTPATIENT)
Dept: CARDIOLOGY | Facility: CLINIC | Age: 66
End: 2021-02-25

## 2021-02-25 ENCOUNTER — OFFICE VISIT (OUTPATIENT)
Dept: CARDIOLOGY | Facility: CLINIC | Age: 66
End: 2021-02-25

## 2021-02-25 ENCOUNTER — APPOINTMENT (OUTPATIENT)
Dept: CARDIOLOGY | Facility: HOSPITAL | Age: 66
End: 2021-02-25

## 2021-02-25 ENCOUNTER — TELEPHONE (OUTPATIENT)
Dept: FAMILY MEDICINE CLINIC | Facility: CLINIC | Age: 66
End: 2021-02-25

## 2021-02-25 VITALS
SYSTOLIC BLOOD PRESSURE: 135 MMHG | OXYGEN SATURATION: 97 % | DIASTOLIC BLOOD PRESSURE: 87 MMHG | TEMPERATURE: 97 F | BODY MASS INDEX: 37.27 KG/M2 | HEART RATE: 76 BPM | HEIGHT: 72 IN | RESPIRATION RATE: 20 BRPM

## 2021-02-25 VITALS
SYSTOLIC BLOOD PRESSURE: 124 MMHG | BODY MASS INDEX: 37.84 KG/M2 | WEIGHT: 279.4 LBS | HEART RATE: 69 BPM | HEIGHT: 72 IN | DIASTOLIC BLOOD PRESSURE: 70 MMHG

## 2021-02-25 DIAGNOSIS — Z86.73 H/O: CVA (CEREBROVASCULAR ACCIDENT): ICD-10-CM

## 2021-02-25 DIAGNOSIS — I10 ESSENTIAL HYPERTENSION: Primary | ICD-10-CM

## 2021-02-25 DIAGNOSIS — I49.3 PVC'S (PREMATURE VENTRICULAR CONTRACTIONS): ICD-10-CM

## 2021-02-25 DIAGNOSIS — R73.03 PREDIABETES: ICD-10-CM

## 2021-02-25 DIAGNOSIS — R00.2 PALPITATIONS: ICD-10-CM

## 2021-02-25 DIAGNOSIS — R00.1 BRADYCARDIA: Primary | ICD-10-CM

## 2021-02-25 LAB
ALBUMIN SERPL-MCNC: 3.7 G/DL (ref 3.5–5.2)
ALBUMIN/GLOB SERPL: 1.5 G/DL
ALP SERPL-CCNC: 82 U/L (ref 39–117)
ALT SERPL W P-5'-P-CCNC: 23 U/L (ref 1–41)
ANION GAP SERPL CALCULATED.3IONS-SCNC: 8.4 MMOL/L (ref 5–15)
AST SERPL-CCNC: 12 U/L (ref 1–40)
B PARAPERT DNA SPEC QL NAA+PROBE: NOT DETECTED
B PERT DNA SPEC QL NAA+PROBE: NOT DETECTED
BILIRUB SERPL-MCNC: 0.3 MG/DL (ref 0–1.2)
BILIRUB UR QL STRIP: NEGATIVE
BUN SERPL-MCNC: 15 MG/DL (ref 8–23)
BUN/CREAT SERPL: 23.4 (ref 7–25)
C PNEUM DNA NPH QL NAA+NON-PROBE: NOT DETECTED
CALCIUM SPEC-SCNC: 8.6 MG/DL (ref 8.6–10.5)
CHLORIDE SERPL-SCNC: 108 MMOL/L (ref 98–107)
CLARITY UR: CLEAR
CO2 SERPL-SCNC: 24.6 MMOL/L (ref 22–29)
COLOR UR: YELLOW
CREAT SERPL-MCNC: 0.64 MG/DL (ref 0.76–1.27)
D-LACTATE SERPL-SCNC: 1.1 MMOL/L (ref 0.5–2)
FLUAV SUBTYP SPEC NAA+PROBE: NOT DETECTED
FLUBV RNA ISLT QL NAA+PROBE: NOT DETECTED
GFR SERPL CREATININE-BSD FRML MDRD: 126 ML/MIN/1.73
GLOBULIN UR ELPH-MCNC: 2.5 GM/DL
GLUCOSE SERPL-MCNC: 144 MG/DL (ref 65–99)
GLUCOSE UR STRIP-MCNC: NEGATIVE MG/DL
HADV DNA SPEC NAA+PROBE: NOT DETECTED
HCOV 229E RNA SPEC QL NAA+PROBE: NOT DETECTED
HCOV HKU1 RNA SPEC QL NAA+PROBE: NOT DETECTED
HCOV NL63 RNA SPEC QL NAA+PROBE: NOT DETECTED
HCOV OC43 RNA SPEC QL NAA+PROBE: NOT DETECTED
HGB UR QL STRIP.AUTO: NEGATIVE
HMPV RNA NPH QL NAA+NON-PROBE: NOT DETECTED
HPIV1 RNA SPEC QL NAA+PROBE: NOT DETECTED
HPIV2 RNA SPEC QL NAA+PROBE: NOT DETECTED
HPIV3 RNA NPH QL NAA+PROBE: NOT DETECTED
HPIV4 P GENE NPH QL NAA+PROBE: NOT DETECTED
KETONES UR QL STRIP: ABNORMAL
LEUKOCYTE ESTERASE UR QL STRIP.AUTO: NEGATIVE
M PNEUMO IGG SER IA-ACNC: NOT DETECTED
NITRITE UR QL STRIP: NEGATIVE
PH UR STRIP.AUTO: 5.5 [PH] (ref 5–8)
POTASSIUM SERPL-SCNC: 3.5 MMOL/L (ref 3.5–5.2)
PROCALCITONIN SERPL-MCNC: 0.09 NG/ML (ref 0–0.25)
PROT SERPL-MCNC: 6.2 G/DL (ref 6–8.5)
PROT UR QL STRIP: NEGATIVE
QT INTERVAL: 402 MS
RHINOVIRUS RNA SPEC NAA+PROBE: NOT DETECTED
RSV RNA NPH QL NAA+NON-PROBE: NOT DETECTED
SARS-COV-2 RNA NPH QL NAA+NON-PROBE: NOT DETECTED
SODIUM SERPL-SCNC: 141 MMOL/L (ref 136–145)
SP GR UR STRIP: 1.02 (ref 1–1.03)
TROPONIN T SERPL-MCNC: <0.01 NG/ML (ref 0–0.03)
UROBILINOGEN UR QL STRIP: ABNORMAL

## 2021-02-25 PROCEDURE — 93000 ELECTROCARDIOGRAM COMPLETE: CPT | Performed by: INTERNAL MEDICINE

## 2021-02-25 PROCEDURE — 99204 OFFICE O/P NEW MOD 45 MIN: CPT | Performed by: INTERNAL MEDICINE

## 2021-02-25 PROCEDURE — 93010 ELECTROCARDIOGRAM REPORT: CPT | Performed by: INTERNAL MEDICINE

## 2021-02-25 RX ORDER — METOPROLOL SUCCINATE 25 MG/1
25 TABLET, EXTENDED RELEASE ORAL NIGHTLY
Qty: 90 TABLET | Refills: 3 | Status: SHIPPED | OUTPATIENT
Start: 2021-02-25 | End: 2021-12-29 | Stop reason: SDUPTHER

## 2021-02-25 NOTE — TELEPHONE ENCOUNTER
TOM CALLED FROM [facility] TO LET JEY KNOW TO TELL THE PATIENT NOT TO COME TO THEIR OFFICE.    SHE WOULD LIKE A CALL BACK ASAP. UNABLE TO WARM TRANSFER.    SHE CAN BE REACHED [phone]

## 2021-02-25 NOTE — TELEPHONE ENCOUNTER
Lyly from Dr Urena's office called to request that patient be seen at office as soon as possible. There were no available providers for tomorrow so patient was coming to [facility]. After noting that patient was just discharged from [facility] ED at 6:15am with negative Troponin I spoke with Dr Daniels and ist was decided to have patient not come to [facility] but be seen in her clinic. Called patient at 13:42 and told him to tell registration that he was an add-on for Dr Daniels not [facility]. Patient verbalized understanding  And stated he would be hear within 10 minutes.  Left message at Dr Urena's office for Lyly to call me back at [phone] in regard to patient not coming to [facility].    Marita Ramos RN  [facility]

## 2021-03-02 ENCOUNTER — TELEPHONE (OUTPATIENT)
Dept: CARDIOLOGY | Facility: CLINIC | Age: 66
End: 2021-03-02

## 2021-03-02 DIAGNOSIS — R00.2 PALPITATIONS: Primary | ICD-10-CM

## 2021-03-02 DIAGNOSIS — I49.3 PVC'S (PREMATURE VENTRICULAR CONTRACTIONS): ICD-10-CM

## 2021-03-02 DIAGNOSIS — I47.1 PSVT (PAROXYSMAL SUPRAVENTRICULAR TACHYCARDIA) (HCC): ICD-10-CM

## 2021-03-02 LAB
BACTERIA SPEC AEROBE CULT: NORMAL
BACTERIA SPEC AEROBE CULT: NORMAL

## 2021-03-02 NOTE — TELEPHONE ENCOUNTER
Called and monitor gave results - no med changes, setting up 2week monitor to look for svt and a fib.

## 2021-03-19 ENCOUNTER — BULK ORDERING (OUTPATIENT)
Dept: CASE MANAGEMENT | Facility: OTHER | Age: 66
End: 2021-03-19

## 2021-03-19 DIAGNOSIS — Z23 IMMUNIZATION DUE: ICD-10-CM

## 2021-03-22 ENCOUNTER — OFFICE VISIT (OUTPATIENT)
Dept: FAMILY MEDICINE CLINIC | Facility: CLINIC | Age: 66
End: 2021-03-22

## 2021-03-22 VITALS
BODY MASS INDEX: 37.79 KG/M2 | TEMPERATURE: 96.6 F | OXYGEN SATURATION: 99 % | HEIGHT: 72 IN | HEART RATE: 66 BPM | DIASTOLIC BLOOD PRESSURE: 80 MMHG | SYSTOLIC BLOOD PRESSURE: 136 MMHG | RESPIRATION RATE: 20 BRPM | WEIGHT: 279 LBS

## 2021-03-22 DIAGNOSIS — I10 ESSENTIAL HYPERTENSION: ICD-10-CM

## 2021-03-22 DIAGNOSIS — R53.83 OTHER FATIGUE: Primary | ICD-10-CM

## 2021-03-22 PROCEDURE — 99214 OFFICE O/P EST MOD 30 MIN: CPT | Performed by: INTERNAL MEDICINE

## 2021-03-22 NOTE — PROGRESS NOTES
Subjective   Jose Ramon Murcia is a 66 y.o. male. Patient is here today for   Chief Complaint   Patient presents with   • Slow Heart Rate     WEAKNESS- PT HERE FOR ER FOLLOW UP           Vitals:    03/22/21 0911   BP: 136/80   Pulse: 66   Resp: 20   Temp: 96.6 °F (35.9 °C)   SpO2: 99%     Body mass index is 37.83 kg/m².      Past Medical History:   Diagnosis Date   • Abnormal EKG     IN PAST   • Allergic    • Arthritis    • ED (erectile dysfunction)    • GERD (gastroesophageal reflux disease)    • Hypertension     IN PAST  NO MEDS   • Kidney stone    • Narcolepsy    • PONV (postoperative nausea and vomiting)     1969 AND 1998   • Sleep apnea       Allergies   Allergen Reactions   • Clarithromycin Other (See Comments)     Unable to eat    • Sulfa Antibiotics Myalgia      Social History     Socioeconomic History   • Marital status:      Spouse name: Not on file   • Number of children: Not on file   • Years of education: Not on file   • Highest education level: Not on file   Tobacco Use   • Smoking status: Never Smoker   • Smokeless tobacco: Never Used   Substance and Sexual Activity   • Alcohol use: Yes     Comment: social//Caffeine use: 1 cup daily    • Drug use: No   • Sexual activity: Defer        Current Outpatient Medications:   •  amphetamine-dextroamphetamine (ADDERALL) 20 MG tablet, Take 20 mg by mouth Daily. Take 2 tablets by mouth every morning and 1 tablet by mouth at noon, Disp: , Rfl:   •  aspirin 81 MG chewable tablet, Chew 81 mg Daily., Disp: , Rfl:   •  atorvastatin (LIPITOR) 80 MG tablet, Take 1 tablet by mouth Daily., Disp: 90 tablet, Rfl: 1  •  azelastine (ASTELIN) 0.1 % nasal spray, SPRAY ONCE IN EACH NOSTRIL TWICE DAILY, Disp: 30 mL, Rfl: 1  •  brimonidine-timolol (COMBIGAN) 0.2-0.5 % ophthalmic solution, 1 drop Every 12 (Twelve) Hours., Disp: , Rfl:   •  fluticasone (FLONASE) 50 MCG/ACT nasal spray, 2 sprays into the nostril(s) as directed by provider Every Night., Disp: , Rfl:   •   metoprolol succinate XL (TOPROL-XL) 25 MG 24 hr tablet, Take 1 tablet by mouth Every Night., Disp: 90 tablet, Rfl: 3  •  Pitolisant HCl (Wakix) 17.8 MG tablet, Take 17.8 mg by mouth. Take 2 tablets every morning., Disp: , Rfl:   •  valsartan (DIOVAN) 160 MG tablet, Take 160 mg by mouth Daily., Disp: , Rfl:      Objective     This patient was in the hospital on February 24 with a complaint of fatigue and a slow heart rate.    He has obstructive sleep apnea and is compliant with use of CPAP.    He had a Holter monitor and followed up with his cardiologist Dr. Daniels who started him on a low-dose of metoprolol XL 25 mg to treat his frequent premature ventricular contractions.    He is going to get a 2-week monitor today.    He tells me that since his stroke he occasionally does feel very fatigued.  Often he is not fatigued at all.    He denies any dyspnea on exertion, chest pains, palpitations.           Review of Systems   Constitutional: Negative.    HENT: Negative.    Respiratory: Negative.    Cardiovascular: Negative.    Musculoskeletal: Negative.    Psychiatric/Behavioral: Negative.        Physical Exam  Vitals and nursing note reviewed.   Constitutional:       Appearance: Normal appearance.      Comments: Pleasant, neatly groomed, no distress.   Cardiovascular:      Rate and Rhythm: Regular rhythm.      Heart sounds: Normal heart sounds. No murmur heard.   No gallop.    Pulmonary:      Effort: No respiratory distress.      Breath sounds: Normal breath sounds. No wheezing or rales.   Neurological:      Mental Status: He is alert and oriented to person, place, and time.   Psychiatric:         Mood and Affect: Mood normal.         Behavior: Behavior normal.         Thought Content: Thought content normal.           Problems Addressed this Visit        Cardiac and Vasculature    BP (high blood pressure)       Symptoms and Signs    Other fatigue - Primary      Diagnoses       Codes Comments    Other fatigue    -   Primary ICD-10-CM: R53.83  ICD-9-CM: 780.79     Essential hypertension     ICD-10-CM: I10  ICD-9-CM: 401.9             PLAN  Is hypertension is well controlled.    His cardiologist started him on metoprolol 25 mg daily for some PVCs he was having.  Patient tells me that his Holter monitor showed a PVC burden of 18%.    He is going to have a 2-week heart monitor done and it will be placed today.    He has a follow-up appointment to see me in my.    He and I reviewed his emergency room lab work from February 24.  No follow-ups on file.

## 2021-04-13 ENCOUNTER — PATIENT MESSAGE (OUTPATIENT)
Dept: CARDIOLOGY | Facility: CLINIC | Age: 66
End: 2021-04-13

## 2021-04-15 ENCOUNTER — TELEPHONE (OUTPATIENT)
Dept: CARDIOLOGY | Facility: CLINIC | Age: 66
End: 2021-04-15

## 2021-04-15 NOTE — TELEPHONE ENCOUNTER
Called to give monitor report and voicemail full.  He needs to be on AC b/c h/o CVA and now PAF on monitor.     No answer on home phone.      pls try to call tomorrow.

## 2021-04-16 ENCOUNTER — TELEPHONE (OUTPATIENT)
Dept: CARDIOLOGY | Facility: CLINIC | Age: 66
End: 2021-04-16

## 2021-04-16 NOTE — TELEPHONE ENCOUNTER
Called patient with montior results. Started him on Eliquis 5 mg po twice a day. Keep follow up on 5/27 with me. Pt verbalized understanding.

## 2021-04-28 RX ORDER — AZELASTINE 1 MG/ML
2 SPRAY, METERED NASAL 2 TIMES DAILY
Qty: 30 ML | Refills: 1 | Status: SHIPPED | OUTPATIENT
Start: 2021-04-28 | End: 2021-05-21

## 2021-05-21 RX ORDER — AZELASTINE 1 MG/ML
SPRAY, METERED NASAL
Qty: 1 EACH | Refills: 1 | Status: SHIPPED | OUTPATIENT
Start: 2021-05-21 | End: 2021-10-29

## 2021-05-27 ENCOUNTER — OFFICE VISIT (OUTPATIENT)
Dept: CARDIOLOGY | Facility: CLINIC | Age: 66
End: 2021-05-27

## 2021-05-27 VITALS
WEIGHT: 276 LBS | BODY MASS INDEX: 37.38 KG/M2 | RESPIRATION RATE: 18 BRPM | DIASTOLIC BLOOD PRESSURE: 82 MMHG | SYSTOLIC BLOOD PRESSURE: 124 MMHG | HEART RATE: 80 BPM | OXYGEN SATURATION: 99 % | HEIGHT: 72 IN

## 2021-05-27 DIAGNOSIS — Z86.73 H/O: CVA (CEREBROVASCULAR ACCIDENT): ICD-10-CM

## 2021-05-27 DIAGNOSIS — I10 ESSENTIAL HYPERTENSION: ICD-10-CM

## 2021-05-27 DIAGNOSIS — G47.33 OBSTRUCTIVE SLEEP APNEA: ICD-10-CM

## 2021-05-27 DIAGNOSIS — I48.0 PAROXYSMAL ATRIAL FIBRILLATION (HCC): Primary | ICD-10-CM

## 2021-05-27 DIAGNOSIS — I49.3 PVC'S (PREMATURE VENTRICULAR CONTRACTIONS): ICD-10-CM

## 2021-05-27 PROCEDURE — 99214 OFFICE O/P EST MOD 30 MIN: CPT | Performed by: NURSE PRACTITIONER

## 2021-05-27 RX ORDER — ROSUVASTATIN CALCIUM 20 MG/1
20 TABLET, COATED ORAL
COMMUNITY
Start: 2021-04-23 | End: 2022-05-12 | Stop reason: SDUPTHER

## 2021-05-27 NOTE — PROGRESS NOTES
Date of Office Visit: 2021  Encounter Provider: JENNIFER Zee  Place of Service: [facility] CARDIOLOGY  Patient Name: Jose Ramon Murcia  :1955      Patient ID:  Jose Ramon Murcia is a 66 y.o. male is here for followup for atrial fibrillation.      History of Present Illness    He saw Dr. Daniels 2021 for generalized weakness.  He was in the emergency department 2021 and discharged on 2021 with weakness.  His laboratory values showed negative troponin, glucose 144, otherwise normal CMP, normal lactate and procalcitonin, normal CBC.  His ECG showed normal sinus rhythm with frequent premature ventricular complexes in a bigeminal pattern.  He had normal TSH 2020.     He has a history of hypertension, GERD, renal calculi, struct of sleep apnea on BiPAP, history of stroke prior to .     He is  and has 2 biologic and 2 stepsons.  He is [employer] .  Is never smoked, he has 2 beers twice a week and 1 cup of coffee daily.  Uses no drugs.  His father had atrial fibrillation fibrillation.  His mother and father both had diabetes and hypertension.  His father had breast cancer.     2020, he suffered a right ventral catie stroke and went to [facility].  There he had an echocardiogram which showed no shunt, mild left ventricular hypertrophy, aorta measuring 3.8 cm and ejection fraction of 59%.  He had 24-hour Holter which showed sinus rhythm and PVCs.  The stroke resulted in some left foot weakness and balance issues as well as slurred speech which is gotten much better and he was doing occupational therapy.  They thought the stroke occurred because of hypertension.  He had a CT of the head neck which showed no significant carotid disease.  He had pretty significant fatigue since the stroke but 2021 was particularly bad.  He got home from occupational therapy and just was very weak and fatigued and took  1/2-hour an hour nap which is not typical for him.  He then got up to eat dinner with his wife's parents and felt so fatigued he had to sit back down.  He then checked his pulse and it was in the 40s so he went to the emergency department.  His blood pressure was 120/70.    He did not have any syncope and did not feel his heart racing, skipping, or pounding.  His bradycardia was felt to be caused by significant PVCs.  He was treated with Toprol-XL 25 mg nightly.  He was placed on a 48-hour Holter monitor to evaluate for PVC burden and atrial fibrillation which revealed a PVC burden of 13% of the time.  His premature ventricular contractions occurred frequently and couplets, bigeminy, and trigeminy.  There was one episode of supraventricular tachycardia that lasted for 7 beats at a rate of 118 bpm with no symptoms.  He then wore a 2-week Zio patch on 3/22/2021.  It showed evidence of atrial fibrillation the longest run being 52 seconds at 135 beats a minute on 4/1/2021.  A. fib occurred less than 1% of the time.  He continued to have PVCs that occurred occasionally with a 10% burden.  There were 3 episodes of ventricular tachycardia, they were short and asymptomatic.  Due to the fact that he had a history of strokes and atrial fibrillation he was started on Eliquis 5 mg p.o. twice daily.    He is here today to follow-up.  He still struggles with fatigue, however, 3 weeks ago he was able to help his wife drive to [place].  He states that even before his stroke this is the first time in a very long time that he has felt up to doing that.  He did have an episode that he thought may have been sun poisoning down in [place].  He was negative for Covid, but told that he had a virus.  He feels better now though.  He has occasional dizziness with bending over, but has not had any other issues s with dizziness, lightheadedness, syncope, or near syncope.  He is not had any chest pain or pressure.  He is not had any shortness of  breath, PND, orthopnea.  He does have sleep apnea and he wears his BiPAP machine religiously.  The course of our conversation we figured out that he has not been taking his metoprolol.  He states that he started taking it for a while and does not know how or why he came off of that.  He will go back on it when he gets home.  He has not felt his heart racing or skipping and does not know when he is in atrial fibrillation.  He has had some episodes lately where his heart beats harder and may be just a little faster, he is not sure if this is A. Fib or PVCs.  He cannot say that he knows exactly when he is in A. fib.  In sinus rhythm today.  Otherwise he is taking his medications exactly as prescribed and has not missed any doses of his Eliquis.    Past Medical History:   Diagnosis Date   • Abnormal EKG     IN PAST   • Allergic    • Arthritis    • ED (erectile dysfunction)    • GERD (gastroesophageal reflux disease)    • Hypertension     IN PAST  NO MEDS   • Kidney stone    • Narcolepsy    • PONV (postoperative nausea and vomiting)     1969 AND 1998   • Sleep apnea          Past Surgical History:   Procedure Laterality Date   • ANAL FISTULOTOMY     • APPENDECTOMY     • BACK SURGERY      CERVICAL   • COLONOSCOPY     • KNEE ARTHROSCOPY Right 2/12/2019    Procedure: Knee Arthroscopy with PARTICIAL MEDIAL Menisectomy;  Surgeon: Dwaine Connolly MD;  Location: [facility] OR [facility];  Service: Orthopedics   • NECK SURGERY     • WISDOM TOOTH EXTRACTION         Current Outpatient Medications on File Prior to Visit   Medication Sig Dispense Refill   • amphetamine-dextroamphetamine (ADDERALL) 20 MG tablet Take 20 mg by mouth Daily. Take 2 tablets by mouth every morning and 1 tablet by mouth at noon     • apixaban (ELIQUIS) 5 MG tablet tablet Take 1 tablet by mouth Every 12 (Twelve) Hours. 90 tablet 3   • atorvastatin (LIPITOR) 80 MG tablet Take 1 tablet by mouth Daily. 90 tablet 1   • azelastine (ASTELIN) 0.1 % nasal spray INSTILL 2  SPRAYS INTO EACH NOSTRIL TWICE A DAY 1 each 1   • brimonidine-timolol (COMBIGAN) 0.2-0.5 % ophthalmic solution 1 drop Every 12 (Twelve) Hours.     • fluticasone (FLONASE) 50 MCG/ACT nasal spray 2 sprays into the nostril(s) as directed by provider Every Night.     • Pitolisant HCl (Wakix) 17.8 MG tablet Take 17.8 mg by mouth. Take 2 tablets every morning.     • valsartan (DIOVAN) 160 MG tablet Take 160 mg by mouth Daily.     • metoprolol succinate XL (TOPROL-XL) 25 MG 24 hr tablet Take 1 tablet by mouth Every Night. 90 tablet 3   • rosuvastatin (CRESTOR) 20 MG tablet Take 20 mg by mouth every night at bedtime.       No current facility-administered medications on file prior to visit.       Social History     Socioeconomic History   • Marital status:      Spouse name: Not on file   • Number of children: Not on file   • Years of education: Not on file   • Highest education level: Not on file   Tobacco Use   • Smoking status: Never Smoker   • Smokeless tobacco: Never Used   Vaping Use   • Vaping Use: Never used   Substance and Sexual Activity   • Alcohol use: Yes     Comment: social//Caffeine use: 1 cup daily    • Drug use: No   • Sexual activity: Defer           Review of Systems   Constitutional: Negative for chills, decreased appetite, diaphoresis, fever and malaise/fatigue.   HENT: Negative for nosebleeds.    Eyes: Negative for blurred vision.   Cardiovascular: Negative for chest pain, claudication, cyanosis, dyspnea on exertion, irregular heartbeat, leg swelling, near-syncope, orthopnea, palpitations, paroxysmal nocturnal dyspnea and syncope.   Respiratory: Negative for cough, hemoptysis, shortness of breath, sleep disturbances due to breathing, snoring and wheezing.    Hematologic/Lymphatic: Negative for bleeding problem. Does not bruise/bleed easily.   Musculoskeletal: Negative for falls.   Gastrointestinal: Negative for heartburn.   Neurological: Negative for excessive daytime sleepiness, dizziness,  "headaches, light-headedness, numbness and weakness.       Procedures  Procedures        Objective:      Vitals:    05/27/21 1003   BP: 124/82   BP Location: Right arm   Patient Position: Sitting   Cuff Size: Adult   Resp: 18   SpO2: 99%   Weight: 125 kg (276 lb)   Height: 182.9 cm (72\")     Body mass index is 37.43 kg/m².    Constitutional:       Appearance: Normal and healthy appearance. Well-developed.   Eyes:      Conjunctiva/sclera: Conjunctivae normal.   Neck:      Vascular: No carotid bruit.   Pulmonary:      Effort: Pulmonary effort is normal.      Breath sounds: Normal breath sounds.   Cardiovascular:      PMI at left midclavicular line. Normal rate. Regular rhythm.      Murmurs: There is no murmur.   Pulses:     Intact distal pulses.   Edema:     Peripheral edema absent.   Abdominal:      General: There is no abdominal bruit.   Neurological:      Mental Status: Alert.   Psychiatric:         Behavior: Behavior is cooperative.         Lab Review:       Assessment:      Diagnosis Plan   1. Paroxysmal atrial fibrillation (CMS/HCC)     2. Essential hypertension     3. Obstructive sleep apnea     4. PVC's (premature ventricular contractions)     5. H/O: CVA (cerebrovascular accident)         1.  Frequent PVCs in a bigeminal pattern.    His PVC burden is about 10% on a 2-week ZIO.  He was started on metoprolol 25 mg nightly, he took it for a while and somehow it fell off of his regimen at home.  He has not felt bad with these, just fatigued.  2. Fatigue, possibly due to post stroke and PVCs.   3. DORA on BiPAP  4. History of stroke November 2020  5. Obesity  6. Hypertension  Plan:       We will continue his current medication regimen.  I have asked him to start taking his metoprolol again.  He is pretty deconditioned and he wanted to start exercising on his recumbent bike again.  I have encouraged him to start slow but to try to shoot for 10 to 15 minutes a day even if he has to break it up into 2 sessions.  He may " need a stress test to evaluate his PVCs if they persist despite being on medication.  He will follow-up with Dr. Daniels in 6 months.  He will call sooner if he has any issues.

## 2021-07-08 DIAGNOSIS — I48.0 PAROXYSMAL ATRIAL FIBRILLATION (HCC): Primary | ICD-10-CM

## 2021-07-19 ENCOUNTER — OFFICE VISIT (OUTPATIENT)
Dept: FAMILY MEDICINE CLINIC | Facility: CLINIC | Age: 66
End: 2021-07-19

## 2021-07-19 VITALS
RESPIRATION RATE: 18 BRPM | SYSTOLIC BLOOD PRESSURE: 128 MMHG | WEIGHT: 283.8 LBS | HEART RATE: 75 BPM | TEMPERATURE: 96.9 F | HEIGHT: 72 IN | BODY MASS INDEX: 38.44 KG/M2 | DIASTOLIC BLOOD PRESSURE: 82 MMHG | OXYGEN SATURATION: 97 %

## 2021-07-19 DIAGNOSIS — R73.03 PREDIABETES: Primary | ICD-10-CM

## 2021-07-19 DIAGNOSIS — I10 ESSENTIAL HYPERTENSION: ICD-10-CM

## 2021-07-19 DIAGNOSIS — I48.0 PAROXYSMAL ATRIAL FIBRILLATION (HCC): ICD-10-CM

## 2021-07-19 DIAGNOSIS — E66.01 CLASS 2 SEVERE OBESITY DUE TO EXCESS CALORIES WITH SERIOUS COMORBIDITY AND BODY MASS INDEX (BMI) OF 35.0 TO 35.9 IN ADULT (HCC): ICD-10-CM

## 2021-07-19 LAB — HBA1C MFR BLD: 6.2 %

## 2021-07-19 PROCEDURE — 99214 OFFICE O/P EST MOD 30 MIN: CPT | Performed by: INTERNAL MEDICINE

## 2021-07-19 PROCEDURE — 3044F HG A1C LEVEL LT 7.0%: CPT | Performed by: INTERNAL MEDICINE

## 2021-07-19 PROCEDURE — 83036 HEMOGLOBIN GLYCOSYLATED A1C: CPT | Performed by: INTERNAL MEDICINE

## 2021-07-19 RX ORDER — KETOCONAZOLE 20 MG/G
CREAM TOPICAL DAILY
Qty: 30 G | Refills: 3 | Status: SHIPPED | OUTPATIENT
Start: 2021-07-19 | End: 2022-06-23

## 2021-07-19 NOTE — PROGRESS NOTES
Subjective   Jose Ramon Murcia is a 66 y.o. male. Patient is here today for   Chief Complaint   Patient presents with   • Hypertension     follow up, pt c/o fatigue, pain with urination and odor           Vitals:    07/19/21 0854   BP: 128/82   Pulse: 75   Resp: 18   Temp: 96.9 °F (36.1 °C)   SpO2: 97%     Body mass index is 38.48 kg/m².      Past Medical History:   Diagnosis Date   • A-fib (CMS/HCC)    • Abnormal EKG     IN PAST   • Allergic    • Arthritis    • ED (erectile dysfunction)    • GERD (gastroesophageal reflux disease)    • Hypertension     IN PAST  NO MEDS   • Kidney stone    • Narcolepsy    • PONV (postoperative nausea and vomiting)     1969 AND 1998   • Sleep apnea       Allergies   Allergen Reactions   • Clarithromycin Other (See Comments)     Unable to eat    • Sulfa Antibiotics Myalgia      Social History     Socioeconomic History   • Marital status:      Spouse name: Not on file   • Number of children: Not on file   • Years of education: Not on file   • Highest education level: Not on file   Tobacco Use   • Smoking status: Never Smoker   • Smokeless tobacco: Never Used   Vaping Use   • Vaping Use: Never used   Substance and Sexual Activity   • Alcohol use: Yes     Comment: social//Caffeine use: 1 cup daily    • Drug use: No   • Sexual activity: Defer        Current Outpatient Medications:   •  amphetamine-dextroamphetamine (ADDERALL) 20 MG tablet, Take 20 mg by mouth Daily. Take 2 tablets by mouth every morning and 1 tablet by mouth at noon, Disp: , Rfl:   •  apixaban (ELIQUIS) 5 MG tablet tablet, Take 1 tablet by mouth Every 12 (Twelve) Hours., Disp: 90 tablet, Rfl: 3  •  atorvastatin (LIPITOR) 80 MG tablet, Take 1 tablet by mouth Daily., Disp: 90 tablet, Rfl: 1  •  azelastine (ASTELIN) 0.1 % nasal spray, INSTILL 2 SPRAYS INTO EACH NOSTRIL TWICE A DAY, Disp: 1 each, Rfl: 1  •  brimonidine-timolol (COMBIGAN) 0.2-0.5 % ophthalmic solution, 1 drop Every 12 (Twelve) Hours., Disp: , Rfl:   •   fluticasone (FLONASE) 50 MCG/ACT nasal spray, 2 sprays into the nostril(s) as directed by provider Every Night., Disp: , Rfl:   •  metoprolol succinate XL (TOPROL-XL) 25 MG 24 hr tablet, Take 1 tablet by mouth Every Night., Disp: 90 tablet, Rfl: 3  •  Pitolisant HCl (Wakix) 17.8 MG tablet, Take 17.8 mg by mouth. Take 2 tablets every morning., Disp: , Rfl:   •  rosuvastatin (CRESTOR) 20 MG tablet, Take 20 mg by mouth every night at bedtime., Disp: , Rfl:   •  valsartan (DIOVAN) 160 MG tablet, Take 160 mg by mouth Daily., Disp: , Rfl:   •  ketoconazole (NIZORAL) 2 % cream, Apply  topically to the appropriate area as directed Daily., Disp: 30 g, Rfl: 3     Objective     This patient complains of fatigue. He is going to see his sleep specialist tomorrow actually.    He is develops a flaky rash in his eyebrows.           Review of Systems   Constitutional: Positive for fatigue.   Respiratory: Negative.    Cardiovascular: Negative.    Skin:        Flaky rash in his eyebrows.   Psychiatric/Behavioral: Negative.        Physical Exam  Vitals and nursing note reviewed.   Constitutional:       Appearance: Normal appearance. He is obese. He is not ill-appearing or diaphoretic.      Comments: Pleasant, neatly groomed, no distress.   Cardiovascular:      Rate and Rhythm: Regular rhythm.      Heart sounds: Normal heart sounds. No murmur heard.   No gallop.    Pulmonary:      Effort: No respiratory distress.      Breath sounds: Normal breath sounds. No wheezing or rales.   Neurological:      Mental Status: He is alert and oriented to person, place, and time.   Psychiatric:         Mood and Affect: Mood normal.         Thought Content: Thought content normal.           Problems Addressed this Visit        Cardiac and Vasculature    BP (high blood pressure)    Paroxysmal atrial fibrillation (CMS/HCC)       Endocrine and Metabolic    Prediabetes - Primary    Relevant Orders    POC Glycosylated Hemoglobin (Hb A1C) (Completed)    Class  2 severe obesity due to excess calories with serious comorbidity in adult (CMS/[facility])      Diagnoses       Codes Comments    Prediabetes    -  Primary ICD-10-CM: R73.03  ICD-9-CM: 790.29     Paroxysmal atrial fibrillation (CMS/[facility])     ICD-10-CM: I48.0  ICD-9-CM: 427.31     Essential hypertension     ICD-10-CM: I10  ICD-9-CM: 401.9     Class 2 severe obesity due to excess calories with serious comorbidity and body mass index (BMI) of 35.0 to 35.9 in adult (CMS/[facility])     ICD-10-CM: E66.01, Z68.35  ICD-9-CM: 278.01, V85.35             PLAN  His hypertension is well controlled.    He has paroxysmal atrial fibrillation with a controlled ventricular response. He is obese with a BMI of 38. He is prediabetic. His hemoglobin A1c is 5.8 today.    He has narcolepsy and the symptoms are addressed by his pulmonologist.    I have encouraged him to lose weight and get regular aerobic activity per American Heart Association guidelines.    He should follow-up for an annual physical exam at his convenience.    He did not mention any dysuria difficulty with urination today. I will have to ask him back to check urinalysis for him in light of his complaints.  No follow-ups on file.

## 2021-10-14 ENCOUNTER — TELEPHONE (OUTPATIENT)
Dept: CARDIOLOGY | Facility: CLINIC | Age: 66
End: 2021-10-14

## 2021-10-15 RX ORDER — APIXABAN 5 MG/1
TABLET, FILM COATED ORAL
Qty: 180 TABLET | Refills: 1 | Status: SHIPPED | OUTPATIENT
Start: 2021-10-15 | End: 2021-12-08 | Stop reason: SDUPTHER

## 2021-10-29 ENCOUNTER — TELEPHONE (OUTPATIENT)
Dept: FAMILY MEDICINE CLINIC | Facility: CLINIC | Age: 66
End: 2021-10-29

## 2021-10-29 RX ORDER — VALSARTAN 160 MG/1
TABLET ORAL
Qty: 180 TABLET | Refills: 2 | Status: SHIPPED | OUTPATIENT
Start: 2021-10-29 | End: 2022-06-21 | Stop reason: SDUPTHER

## 2021-10-29 RX ORDER — AZELASTINE 1 MG/ML
SPRAY, METERED NASAL
Qty: 30 EACH | Refills: 1 | Status: SHIPPED | OUTPATIENT
Start: 2021-10-29 | End: 2021-11-17 | Stop reason: SDUPTHER

## 2021-10-29 NOTE — TELEPHONE ENCOUNTER
Called and spoke with pt, informed pt to contact his cardiologist that prescribe the eliquis to check and see if they have started the PA and what the status was he stated  that his cardiologist wanted dr. Urena to take over prescribing the eliquis that's why he called the office, informed him I could send a request to dr. Urena for future refills but if he could still contact the office since they just sent in the RX and see if they have started the PA an the status. Pt stated he would and he will contact the office if he needs anything further.

## 2021-10-29 NOTE — TELEPHONE ENCOUNTER
PATIENT CALLING STATES HE NEEDS A PA FOR Eliquis 5 MG tablet tablet AND NEEDING SOME SAMPLES TO LAST HIM UNTIL THE PA IS DONE       CALLBACK # [phone]

## 2021-11-17 RX ORDER — AZELASTINE 1 MG/ML
2 SPRAY, METERED NASAL 2 TIMES DAILY
Qty: 30 EACH | Refills: 1 | Status: SHIPPED | OUTPATIENT
Start: 2021-11-17 | End: 2022-01-13

## 2021-11-17 NOTE — TELEPHONE ENCOUNTER
Rx Refill Note  Requested Prescriptions     Pending Prescriptions Disp Refills   • azelastine (ASTELIN) 0.1 % nasal spray 30 each 1     Sig: Use in each nostril as directed      Last office visit with prescribing clinician: 7/19/2021      Next office visit with prescribing clinician: 11/24/2021   Pt would like 90 day refills           Orly Meraz MA  11/17/21, 13:43 [facility] Refill Note  Requested Prescriptions     Pending Prescriptions Disp Refills   • azelastine (ASTELIN) 0.1 % nasal spray 30 each 1     Sig: Use in each nostril as directed      Last office visit with prescribing clinician: 7/19/2021      Next office visit with prescribing clinician: 11/24/2021            Orly Meraz MA  11/17/21, 13:43 EST

## 2021-11-18 DIAGNOSIS — R73.03 PREDIABETES: Primary | ICD-10-CM

## 2021-11-20 LAB
ALBUMIN SERPL-MCNC: 4.2 G/DL (ref 3.8–4.8)
ALBUMIN/GLOB SERPL: 1.8 {RATIO} (ref 1.2–2.2)
ALP SERPL-CCNC: 102 IU/L (ref 44–121)
ALT SERPL-CCNC: 26 IU/L (ref 0–44)
APPEARANCE UR: CLEAR
AST SERPL-CCNC: 16 IU/L (ref 0–40)
BACTERIA #/AREA URNS HPF: NORMAL /[HPF]
BASOPHILS # BLD AUTO: 0.1 X10E3/UL (ref 0–0.2)
BASOPHILS NFR BLD AUTO: 1 %
BILIRUB SERPL-MCNC: 0.4 MG/DL (ref 0–1.2)
BILIRUB UR QL STRIP: NEGATIVE
BUN SERPL-MCNC: 13 MG/DL (ref 8–27)
BUN/CREAT SERPL: 15 (ref 10–24)
CALCIUM SERPL-MCNC: 8.8 MG/DL (ref 8.6–10.2)
CASTS URNS QL MICRO: NORMAL /LPF
CHLORIDE SERPL-SCNC: 103 MMOL/L (ref 96–106)
CO2 SERPL-SCNC: 25 MMOL/L (ref 20–29)
COLOR UR: YELLOW
CREAT SERPL-MCNC: 0.85 MG/DL (ref 0.76–1.27)
EOSINOPHIL # BLD AUTO: 0.3 X10E3/UL (ref 0–0.4)
EOSINOPHIL NFR BLD AUTO: 5 %
EPI CELLS #/AREA URNS HPF: NORMAL /HPF (ref 0–10)
ERYTHROCYTE [DISTWIDTH] IN BLOOD BY AUTOMATED COUNT: 13.7 % (ref 11.6–15.4)
GLOBULIN SER CALC-MCNC: 2.4 G/DL (ref 1.5–4.5)
GLUCOSE SERPL-MCNC: 111 MG/DL (ref 65–99)
GLUCOSE UR QL: NEGATIVE
HBA1C MFR BLD: 6.6 % (ref 4.8–5.6)
HCT VFR BLD AUTO: 47 % (ref 37.5–51)
HGB BLD-MCNC: 15.8 G/DL (ref 13–17.7)
HGB UR QL STRIP: NEGATIVE
IMM GRANULOCYTES # BLD AUTO: 0 X10E3/UL (ref 0–0.1)
IMM GRANULOCYTES NFR BLD AUTO: 0 %
KETONES UR QL STRIP: NEGATIVE
LEUKOCYTE ESTERASE UR QL STRIP: NEGATIVE
LYMPHOCYTES # BLD AUTO: 1.8 X10E3/UL (ref 0.7–3.1)
LYMPHOCYTES NFR BLD AUTO: 25 %
MCH RBC QN AUTO: 30.6 PG (ref 26.6–33)
MCHC RBC AUTO-ENTMCNC: 33.6 G/DL (ref 31.5–35.7)
MCV RBC AUTO: 91 FL (ref 79–97)
MICRO URNS: NORMAL
MICRO URNS: NORMAL
MONOCYTES # BLD AUTO: 0.5 X10E3/UL (ref 0.1–0.9)
MONOCYTES NFR BLD AUTO: 8 %
NEUTROPHILS # BLD AUTO: 4.3 X10E3/UL (ref 1.4–7)
NEUTROPHILS NFR BLD AUTO: 61 %
NITRITE UR QL STRIP: NEGATIVE
PH UR STRIP: 6 [PH] (ref 5–7.5)
PLATELET # BLD AUTO: 162 X10E3/UL (ref 150–450)
POTASSIUM SERPL-SCNC: 4.1 MMOL/L (ref 3.5–5.2)
PROT SERPL-MCNC: 6.6 G/DL (ref 6–8.5)
PROT UR QL STRIP: NEGATIVE
RBC # BLD AUTO: 5.17 X10E6/UL (ref 4.14–5.8)
RBC #/AREA URNS HPF: NORMAL /HPF (ref 0–2)
SODIUM SERPL-SCNC: 140 MMOL/L (ref 134–144)
SP GR UR: 1.02 (ref 1–1.03)
UROBILINOGEN UR STRIP-MCNC: 0.2 MG/DL (ref 0.2–1)
WBC # BLD AUTO: 7 X10E3/UL (ref 3.4–10.8)
WBC #/AREA URNS HPF: NORMAL /HPF (ref 0–5)

## 2021-11-24 ENCOUNTER — OFFICE VISIT (OUTPATIENT)
Dept: FAMILY MEDICINE CLINIC | Facility: CLINIC | Age: 66
End: 2021-11-24

## 2021-11-24 VITALS
SYSTOLIC BLOOD PRESSURE: 134 MMHG | WEIGHT: 279.4 LBS | HEIGHT: 72 IN | BODY MASS INDEX: 37.84 KG/M2 | TEMPERATURE: 98.2 F | OXYGEN SATURATION: 98 % | DIASTOLIC BLOOD PRESSURE: 82 MMHG | HEART RATE: 72 BPM

## 2021-11-24 DIAGNOSIS — Z23 FLU VACCINE NEED: ICD-10-CM

## 2021-11-24 DIAGNOSIS — Z12.11 COLON CANCER SCREENING: Primary | ICD-10-CM

## 2021-11-24 PROCEDURE — 90471 IMMUNIZATION ADMIN: CPT | Performed by: INTERNAL MEDICINE

## 2021-11-24 PROCEDURE — 90686 IIV4 VACC NO PRSV 0.5 ML IM: CPT | Performed by: INTERNAL MEDICINE

## 2021-11-24 PROCEDURE — 99214 OFFICE O/P EST MOD 30 MIN: CPT | Performed by: INTERNAL MEDICINE

## 2021-11-24 RX ORDER — MODAFINIL 100 MG/1
100 TABLET ORAL DAILY
COMMUNITY
Start: 2021-07-21 | End: 2022-12-14

## 2021-11-24 NOTE — PROGRESS NOTES
Subjective   Jose Ramon Doll is a 66 y.o. male. Patient is here today for   Chief Complaint   Patient presents with   • Diabetes     Followc Up Labs          Vitals:    11/24/21 0953   BP: 134/82   Pulse: 72   Temp: 98.2 °F (36.8 °C)   SpO2: 98%     Body mass index is 37.89 kg/m².      Past Medical History:   Diagnosis Date   • A-fib (HCC)    • Abnormal EKG     IN PAST   • Allergic    • Arthritis    • ED (erectile dysfunction)    • GERD (gastroesophageal reflux disease)    • Hypertension     IN PAST  NO MEDS   • Kidney stone    • Narcolepsy    • PONV (postoperative nausea and vomiting)     1969 AND 1998   • Sleep apnea       Allergies   Allergen Reactions   • Clarithromycin Other (See Comments)     Unable to eat    • Sulfa Antibiotics Myalgia      Social History     Socioeconomic History   • Marital status:    Tobacco Use   • Smoking status: Never Smoker   • Smokeless tobacco: Never Used   Vaping Use   • Vaping Use: Never used   Substance and Sexual Activity   • Alcohol use: Yes     Comment: social//Caffeine use: 1 cup daily    • Drug use: No   • Sexual activity: Defer        Current Outpatient Medications:   •  amphetamine-dextroamphetamine (ADDERALL) 20 MG tablet, Take 20 mg by mouth Daily. Take 2 tablets by mouth every morning and 1 tablet by mouth at noon, Disp: , Rfl:   •  atorvastatin (LIPITOR) 80 MG tablet, Take 1 tablet by mouth Daily., Disp: 90 tablet, Rfl: 1  •  azelastine (ASTELIN) 0.1 % nasal spray, 2 sprays into the nostril(s) as directed by provider 2 (Two) Times a Day. Use in each nostril as directed, Disp: 30 each, Rfl: 1  •  brimonidine-timolol (COMBIGAN) 0.2-0.5 % ophthalmic solution, 1 drop Every 12 (Twelve) Hours., Disp: , Rfl:   •  Eliquis 5 MG tablet tablet, TAKE 1 TABLET BY MOUTH EVERY 12 HOURS, Disp: 180 tablet, Rfl: 1  •  fluticasone (FLONASE) 50 MCG/ACT nasal spray, 2 sprays into the nostril(s) as directed by provider Every Night., Disp: , Rfl:   •  ketoconazole (NIZORAL) 2 %  cream, Apply  topically to the appropriate area as directed Daily., Disp: 30 g, Rfl: 3  •  metoprolol succinate XL (TOPROL-XL) 25 MG 24 hr tablet, Take 1 tablet by mouth Every Night., Disp: 90 tablet, Rfl: 3  •  modafinil (PROVIGIL) 100 MG tablet, Take 100 mg by mouth Daily., Disp: , Rfl:   •  rosuvastatin (CRESTOR) 20 MG tablet, Take 20 mg by mouth every night at bedtime., Disp: , Rfl:   •  valsartan (DIOVAN) 160 MG tablet, TAKE 1 TABLET BY MOUTH TWICE A DAY, Disp: 180 tablet, Rfl: 2     Objective     He is here to follow-up on type 2 diabetes and labs he had done last week.    He has no complaints.       Review of Systems   Constitutional: Negative.    HENT: Negative.    Respiratory: Negative.    Cardiovascular: Negative.    Musculoskeletal: Negative.    Psychiatric/Behavioral: Negative.        Physical Exam  Vitals and nursing note reviewed.   Constitutional:       Appearance: Normal appearance. He is obese.      Comments: Pleasant, neatly groomed, in no distress.   Cardiovascular:      Rate and Rhythm: Regular rhythm.      Heart sounds: Normal heart sounds. No murmur heard.  No gallop.    Pulmonary:      Effort: No respiratory distress.      Breath sounds: Normal breath sounds. No wheezing or rales.   Neurological:      Mental Status: He is alert and oriented to person, place, and time.   Psychiatric:         Mood and Affect: Mood normal.         Behavior: Behavior normal.         Thought Content: Thought content normal.           Problems Addressed this Visit     None      Visit Diagnoses     Colon cancer screening    -  Primary    Relevant Orders    Cologuard - Stool, Per Rectum    Flu vaccine need        Relevant Orders    FluLaval/Fluarix/Fluzone >6 Months ([id number]) (Completed)      Diagnoses       Codes Comments    Colon cancer screening    -  Primary ICD-10-CM: Z12.11  ICD-9-CM: V76.51     Flu vaccine need     ICD-10-CM: Z23  ICD-9-CM: V04.81             PLAN  His hemoglobin A1c is 6.6% reflecting good  control of his type 2 diabetes.    He is overdue for colon cancer screening I have arranged for him to get a Cologuard.    We gave him a flu shot today.    I asked him to follow-up in 3 to 4 months.  Fasting labs prior to that visit should include: Hemoglobin A1c, lipid profile, comprehensive metabolic panel, CBC, urinalysis and a PSA if is not been done in a year.  No follow-ups on file.

## 2021-12-08 ENCOUNTER — OFFICE VISIT (OUTPATIENT)
Dept: CARDIOLOGY | Facility: CLINIC | Age: 66
End: 2021-12-08

## 2021-12-08 VITALS
HEIGHT: 72 IN | WEIGHT: 285 LBS | HEART RATE: 78 BPM | BODY MASS INDEX: 38.6 KG/M2 | SYSTOLIC BLOOD PRESSURE: 130 MMHG | DIASTOLIC BLOOD PRESSURE: 80 MMHG

## 2021-12-08 DIAGNOSIS — I48.0 PAROXYSMAL ATRIAL FIBRILLATION (HCC): Primary | ICD-10-CM

## 2021-12-08 DIAGNOSIS — I10 PRIMARY HYPERTENSION: ICD-10-CM

## 2021-12-08 DIAGNOSIS — I49.3 PVC'S (PREMATURE VENTRICULAR CONTRACTIONS): ICD-10-CM

## 2021-12-08 PROCEDURE — 99214 OFFICE O/P EST MOD 30 MIN: CPT | Performed by: INTERNAL MEDICINE

## 2021-12-08 PROCEDURE — 93000 ELECTROCARDIOGRAM COMPLETE: CPT | Performed by: INTERNAL MEDICINE

## 2021-12-08 NOTE — PROGRESS NOTES
Date of Office Visit: 2021  Encounter Provider: Daria Daniels MD  Place of Service: [facility] CARDIOLOGY  Patient Name: Jose Ramon Doll  :1955      Patient ID:  Jose Ramon Doll is a 66 y.o. male is here for  followup for PVCs.         History of Present Illness    He was generalized weakness.  He was in the emergency department 2021 and discharged on 2021 with weakness.  He was discharged at 6 AM His laboratory values showed negative troponin, glucose 144, otherwise normal CMP, normal lactate and procalcitonin, normal CBC.  His ECG showed normal sinus rhythm with frequent premature ventricular complexes in a bigeminal pattern.  He had normal TSH 2020.     He has a history of hypertension, GERD, renal calculi, obstructive sleep apnea on BiPAP, narcolepsy, history of stroke prior to .     He is  and has 2 biologic and 2 stepsons.  He is Jain .  Is never smoked, he has 2 beers twice a week and 1 cup of coffee daily.  Uses no drugs.  His father had atrial fibrillation fibrillation.  His mother and father both had diabetes and hypertension.  His father had breast cancer.     2020, he suffered a right ventral catie stroke and went to [facility].  There he had an echocardiogram which showed no shunt, mild left ventricular hypertrophy, aorta measuring 3.8 cm and ejection fraction of 59%.  He had 24-hour Holter which showed sinus rhythm and PVCs.  The stroke resulted in some left foot weakness and balance issues as well as slurred speech which is gotten much better and he is doing occupational therapy.  They thought the stroke occurred because of hypertension.  He had a CT of the head neck which showed no significant carotid disease.  He has had fatigue which has been pretty significant since the stroke but yesterday was particularly bad.  He got home from occupational therapy and just was very weak  and fatigued and took 1/2-hour an hour nap which is not typical for him.  He then got up to eat dinner with his wife's parents and felt so fatigued he had to sit back down.  He then checked his pulse and it was in the 40s so he went to the emergency department.    It was due to PVCs.    Labs on 11/19/2021 show hemoglobin A1c 6.6, glucose 111, otherwise normal CMP, normal CBC.  Lipids on 7/12/2021 show total cholesterol 132, HDL 40, LDL 65, triglycerides 158.  He wore a 2 week monitor done 3/22/21 showing PAF and she was started on eliquis 5mg bid.      He has occasional palpitations and tachycardia with mild dizziness lasting 30 seconds, no syncope.  He is currently taking metoprolol at nighttime only.  He has no chest tightness or pressure.  He has no orthopnea or PND.  He has no change in exertional dyspnea which is chronic.  He does notice that he seems to have more palpitations after physical exertion.       Past Medical History:   Diagnosis Date   • A-fib (HCC)    • Abnormal EKG     IN PAST   • Allergic    • Arthritis    • ED (erectile dysfunction)    • GERD (gastroesophageal reflux disease)    • Hypertension     IN PAST  NO MEDS   • Kidney stone    • Narcolepsy    • PONV (postoperative nausea and vomiting)     1969 AND 1998   • Sleep apnea          Past Surgical History:   Procedure Laterality Date   • ANAL FISTULOTOMY     • APPENDECTOMY     • BACK SURGERY      CERVICAL   • COLONOSCOPY     • KNEE ARTHROSCOPY Right 2/12/2019    Procedure: Knee Arthroscopy with PARTICIAL MEDIAL Menisectomy;  Surgeon: Dwaine Connolly MD;  Location: [facility] OR [facility];  Service: Orthopedics   • NECK SURGERY     • WISDOM TOOTH EXTRACTION         Current Outpatient Medications on File Prior to Visit   Medication Sig Dispense Refill   • amphetamine-dextroamphetamine (ADDERALL) 20 MG tablet Take 20 mg by mouth Daily. Take 2 tablets by mouth every morning and 1 tablet by mouth at noon     • atorvastatin (LIPITOR) 80 MG tablet Take 1  "tablet by mouth Daily. 90 tablet 1   • azelastine (ASTELIN) 0.1 % nasal spray 2 sprays into the nostril(s) as directed by provider 2 (Two) Times a Day. Use in each nostril as directed 30 each 1   • brimonidine-timolol (COMBIGAN) 0.2-0.5 % ophthalmic solution 1 drop Every 12 (Twelve) Hours.     • Eliquis 5 MG tablet tablet TAKE 1 TABLET BY MOUTH EVERY 12 HOURS 180 tablet 1   • fluticasone (FLONASE) 50 MCG/ACT nasal spray 2 sprays into the nostril(s) as directed by provider Every Night.     • ketoconazole (NIZORAL) 2 % cream Apply  topically to the appropriate area as directed Daily. 30 g 3   • metoprolol succinate XL (TOPROL-XL) 25 MG 24 hr tablet Take 1 tablet by mouth Every Night. 90 tablet 3   • modafinil (PROVIGIL) 100 MG tablet Take 100 mg by mouth Daily.     • rosuvastatin (CRESTOR) 20 MG tablet Take 20 mg by mouth every night at bedtime.     • valsartan (DIOVAN) 160 MG tablet TAKE 1 TABLET BY MOUTH TWICE A  tablet 2     No current facility-administered medications on file prior to visit.       Social History     Socioeconomic History   • Marital status:    Tobacco Use   • Smoking status: Never Smoker   • Smokeless tobacco: Never Used   Vaping Use   • Vaping Use: Never used   Substance and Sexual Activity   • Alcohol use: Yes     Alcohol/week: 2.0 standard drinks     Types: 2 Cans of beer per week     Comment: social//Caffeine use: 1 cup daily    • Drug use: No   • Sexual activity: Defer           ROS    Procedures    ECG 12 Lead    Date/Time: 12/8/2021 9:02 AM  Performed by: Daria Daniels MD  Authorized by: Daria Daniels MD   Comparison: compared with previous ECG   Similar to previous ECG  Rhythm: sinus rhythm    Clinical impression: normal ECG                Objective:      Vitals:    12/08/21 0849 12/08/21 0850   BP: 134/80 130/80   BP Location: Left arm Right arm   Pulse: 78    Weight: 129 kg (285 lb)    Height: 182.9 cm (72\")      Body mass index is 38.65 kg/m².    Vitals " reviewed.   Constitutional:       General: Not in acute distress.     Appearance: Well-developed. Not diaphoretic.   Eyes:      General: No scleral icterus.     Conjunctiva/sclera: Conjunctivae normal.   HENT:      Head: Normocephalic and atraumatic.   Neck:      Thyroid: No thyromegaly.      Vascular: No carotid bruit or JVD.      Lymphadenopathy: No cervical adenopathy.   Pulmonary:      Effort: Pulmonary effort is normal. No respiratory distress.      Breath sounds: Normal breath sounds. No wheezing. No rhonchi. No rales.   Chest:      Chest wall: Not tender to palpatation.   Cardiovascular:      Normal rate. Regular rhythm.      Murmurs: There is no murmur.      No gallop.   Pulses:     Intact distal pulses.   Edema:     Peripheral edema absent.   Abdominal:      General: Bowel sounds are normal. There is no distension or abdominal bruit.      Palpations: Abdomen is soft. There is no abdominal mass.      Tenderness: There is no abdominal tenderness.   Musculoskeletal:         General: No deformity.      Extremities: No clubbing present.     Cervical back: Neck supple. Skin:     General: Skin is warm and dry. There is no cyanosis.      Coloration: Skin is not pale.      Findings: No rash.   Neurological:      Mental Status: Alert and oriented to person, place, and time.      Cranial Nerves: No cranial nerve deficit.   Psychiatric:         Judgment: Judgment normal.         Lab Review:       Assessment:      Diagnosis Plan   1. Paroxysmal atrial fibrillation (HCC)     2. PVC's (premature ventricular contractions)     3. Primary hypertension       1. Frequent PVCs in a bigeminal pattern.  In normal sinus today.  Continue metoprolol but increase it to 25 mg twice daily.  2. Fatigue, possibly due to post stroke and PVCs.   3. DORA on BiPAP  4. History of stroke November 2020  5. Obesity  6. Hypertension  7. PAF, on Eliquis 5 mg twice daily.       Plan:       See aprn in 6 months.  Increase metoprolol succinate to 25mg  bid.  No other testing at this time.

## 2021-12-16 NOTE — ED NOTES
Patient confirmed they are following physician to private practice.  Provider at [facility].  Informed patient to contact physician's private practice effective 7/1/2021 at [phone].    Pt to ED with c/o R knee pain worse x 2-3 weeks. Pt reports pain began on medial aspect of patella, worse with ambulation or at night, unable to sleep comfortably, pain now moving across patella into lateral side. denies SOA or CP, injury to site.      Jackie Truong, RN  12/20/18 9343

## 2021-12-20 ENCOUNTER — OFFICE VISIT (OUTPATIENT)
Dept: FAMILY MEDICINE CLINIC | Facility: CLINIC | Age: 66
End: 2021-12-20

## 2021-12-20 VITALS
DIASTOLIC BLOOD PRESSURE: 76 MMHG | BODY MASS INDEX: 38.64 KG/M2 | HEART RATE: 99 BPM | OXYGEN SATURATION: 98 % | SYSTOLIC BLOOD PRESSURE: 128 MMHG | RESPIRATION RATE: 18 BRPM | TEMPERATURE: 98.3 F | HEIGHT: 72 IN

## 2021-12-20 DIAGNOSIS — J06.9 ACUTE URI: Primary | ICD-10-CM

## 2021-12-20 DIAGNOSIS — R05.9 COUGH: ICD-10-CM

## 2021-12-20 DIAGNOSIS — J02.9 PHARYNGITIS, UNSPECIFIED ETIOLOGY: ICD-10-CM

## 2021-12-20 PROBLEM — G47.19 EXCESSIVE DAYTIME SLEEPINESS: Status: ACTIVE | Noted: 2020-10-27

## 2021-12-20 PROBLEM — I63.9 CVA (CEREBRAL VASCULAR ACCIDENT): Status: ACTIVE | Noted: 2020-11-13

## 2021-12-20 PROBLEM — Z86.73 H/O TIA (TRANSIENT ISCHEMIC ATTACK) AND STROKE: Status: ACTIVE | Noted: 2020-11-13

## 2021-12-20 PROBLEM — G47.419 NARCOLEPSY WITHOUT CATAPLEXY: Status: ACTIVE | Noted: 2019-01-14

## 2021-12-20 LAB
EXPIRATION DATE: NORMAL
EXPIRATION DATE: NORMAL
FLUAV AG NPH QL: NEGATIVE
FLUBV AG NPH QL: NEGATIVE
INTERNAL CONTROL: NORMAL
INTERNAL CONTROL: NORMAL
Lab: NORMAL
Lab: NORMAL
S PYO AG THROAT QL: NEGATIVE

## 2021-12-20 PROCEDURE — 87880 STREP A ASSAY W/OPTIC: CPT | Performed by: NURSE PRACTITIONER

## 2021-12-20 PROCEDURE — 99213 OFFICE O/P EST LOW 20 MIN: CPT | Performed by: NURSE PRACTITIONER

## 2021-12-20 PROCEDURE — 87804 INFLUENZA ASSAY W/OPTIC: CPT | Performed by: NURSE PRACTITIONER

## 2021-12-20 RX ORDER — DEXTROMETHORPHAN HYDROBROMIDE AND PROMETHAZINE HYDROCHLORIDE 15; 6.25 MG/5ML; MG/5ML
5 SYRUP ORAL 3 TIMES DAILY PRN
Qty: 150 ML | Refills: 0 | Status: SHIPPED | OUTPATIENT
Start: 2021-12-20 | End: 2022-06-23

## 2021-12-20 RX ORDER — PREDNISONE 20 MG/1
20 TABLET ORAL 2 TIMES DAILY
Qty: 6 TABLET | Refills: 0 | Status: SHIPPED | OUTPATIENT
Start: 2021-12-20 | End: 2021-12-23

## 2021-12-20 RX ORDER — BENZONATATE 100 MG/1
200 CAPSULE ORAL
COMMUNITY
Start: 2021-12-16 | End: 2021-12-26

## 2021-12-20 NOTE — PROGRESS NOTES
Subjective     Jose Ramon Doll is a 66 y.o.. male.     Pt stating he uses a bipap nightly. Pt stating he went to [facility] last week and dx with viral infection.    Cough  This is a new problem. Episode onset: 1 week. The problem has been gradually worsening. The cough is non-productive. Associated symptoms include postnasal drip, rhinorrhea and a sore throat. Pertinent negatives include no ear pain, fever or headaches.       The following portions of the patient's history were reviewed and updated as appropriate: allergies, current medications, past family history, past medical history, past social history, past surgical history and problem list.    Past Medical History:   Diagnosis Date   • A-fib (HCC)    • Abnormal EKG     IN PAST   • Allergic    • Arthritis    • ED (erectile dysfunction)    • GERD (gastroesophageal reflux disease)    • Hypertension     IN PAST  NO MEDS   • Kidney stone    • Narcolepsy    • PONV (postoperative nausea and vomiting)     1969 AND 1998   • Sleep apnea        Past Surgical History:   Procedure Laterality Date   • ANAL FISTULOTOMY     • APPENDECTOMY     • BACK SURGERY      CERVICAL   • COLONOSCOPY     • KNEE ARTHROSCOPY Right 2/12/2019    Procedure: Knee Arthroscopy with PARTICIAL MEDIAL Menisectomy;  Surgeon: Dwaine Connolly MD;  Location: [facility] OR [facility] [place];  Service: Orthopedics   • NECK SURGERY     • WISDOM TOOTH EXTRACTION         Review of Systems   Constitutional: Negative for fever.   HENT: Positive for congestion, postnasal drip, rhinorrhea and sore throat. Negative for ear pain.    Eyes:        Pressure behind eyes   Respiratory: Positive for cough.    Gastrointestinal: Positive for diarrhea (loose). Negative for nausea and vomiting.   Neurological: Positive for dizziness (with coughing). Negative for headaches.       Allergies   Allergen Reactions   • Clarithromycin Other (See Comments)     Unable to eat    • Sulfa Antibiotics Myalgia       Objective     Vitals:     "12/20/21 1424   BP: 128/76   Pulse: 99   Resp: 18   Temp: 98.3 °F (36.8 °C)   TempSrc: Temporal   SpO2: 98%   Height: 182.9 cm (72.01\")     Body mass index is 38.64 kg/m².    Physical Exam  Vitals reviewed.   HENT:      Head: Normocephalic.      Right Ear: A middle ear effusion (clear) is present. Tympanic membrane is not erythematous.      Left Ear: A middle ear effusion (clear) is present. Tympanic membrane is not erythematous.      Nose: Congestion and rhinorrhea present.      Mouth/Throat:      Mouth: Mucous membranes are moist.      Pharynx: Oropharyngeal exudate (clear pnd) and posterior oropharyngeal erythema (slight) present. No pharyngeal swelling.   Eyes:      Pupils: Pupils are equal, round, and reactive to light.   Cardiovascular:      Rate and Rhythm: Normal rate and regular rhythm.   Pulmonary:      Effort: Pulmonary effort is normal. No respiratory distress.      Breath sounds: Normal breath sounds. No stridor. No wheezing, rhonchi or rales.   Musculoskeletal:         General: Normal range of motion.   Lymphadenopathy:      Cervical: No cervical adenopathy.   Neurological:      Mental Status: He is alert and oriented to person, place, and time.   Psychiatric:         Behavior: Behavior normal.           Current Outpatient Medications:   •  amphetamine-dextroamphetamine (ADDERALL) 20 MG tablet, Take 20 mg by mouth Daily. Take 2 tablets by mouth every morning and 1 tablet by mouth at noon, Disp: , Rfl:   •  apixaban (Eliquis) 5 MG tablet tablet, Take 1 tablet by mouth Every 12 (Twelve) Hours., Disp: 180 tablet, Rfl: 3  •  atorvastatin (LIPITOR) 80 MG tablet, Take 1 tablet by mouth Daily., Disp: 90 tablet, Rfl: 1  •  azelastine (ASTELIN) 0.1 % nasal spray, 2 sprays into the nostril(s) as directed by provider 2 (Two) Times a Day. Use in each nostril as directed, Disp: 30 each, Rfl: 1  •  benzonatate (TESSALON) 100 MG capsule, Take 200 mg by mouth., Disp: , Rfl:   •  brimonidine-timolol (COMBIGAN) 0.2-0.5 " % ophthalmic solution, 1 drop Every 12 (Twelve) Hours., Disp: , Rfl:   •  fluticasone (FLONASE) 50 MCG/ACT nasal spray, 2 sprays into the nostril(s) as directed by provider Every Night., Disp: , Rfl:   •  ketoconazole (NIZORAL) 2 % cream, Apply  topically to the appropriate area as directed Daily., Disp: 30 g, Rfl: 3  •  metoprolol succinate XL (TOPROL-XL) 25 MG 24 hr tablet, Take 1 tablet by mouth Every Night., Disp: 90 tablet, Rfl: 3  •  modafinil (PROVIGIL) 100 MG tablet, Take 100 mg by mouth Daily., Disp: , Rfl:   •  rosuvastatin (CRESTOR) 20 MG tablet, Take 20 mg by mouth every night at bedtime., Disp: , Rfl:   •  valsartan (DIOVAN) 160 MG tablet, TAKE 1 TABLET BY MOUTH TWICE A DAY, Disp: 180 tablet, Rfl: 2  •  predniSONE (DELTASONE) 20 MG tablet, Take 1 tablet by mouth 2 (Two) Times a Day for 3 days., Disp: 6 tablet, Rfl: 0  •  promethazine-dextromethorphan (PROMETHAZINE-DM) 6.25-15 MG/5ML syrup, Take 5 mL by mouth 3 (Three) Times a Day As Needed for Cough., Disp: 150 mL, Rfl: 0    Recent Results (from the past 168 hour(s))   POCT rapid strep A    Collection Time: 12/20/21  2:32 PM    Specimen: Swab   Result Value Ref Range    Rapid Strep A Screen Negative Negative, VALID, INVALID, Not Performed    Internal Control Passed Passed    Lot Number N/[id number]     Expiration Date N/A    POCT Influenza A/B    Collection Time: 12/20/21  2:33 PM    Specimen: Swab   Result Value Ref Range    Rapid Influenza A Ag Negative Negative    Rapid Influenza B Ag Negative Negative    Internal Control Passed Passed    Lot Number N/[id number]     Expiration Date N/A            Assessment/Plan   Diagnoses and all orders for this visit:    1. Acute URI (Primary)    2. Pharyngitis, unspecified etiology  -     POCT rapid strep A    3. Cough  -     COVID-19,[facility] ROUTINE, NP/OP SWAB IN TRANSPORT MEDIA OR ESWAB 72 HR TAT - Swab, Nasopharynx  -     POCT Influenza A/B  -     predniSONE (DELTASONE) 20 MG tablet; Take 1 tablet by mouth 2 (Two) Times a Day  for 3 days.  Dispense: 6 tablet; Refill: 0  -     promethazine-dextromethorphan (PROMETHAZINE-DM) 6.25-15 MG/5ML syrup; Take 5 mL by mouth 3 (Three) Times a Day As Needed for Cough.  Dispense: 150 mL; Refill: 0        Patient Instructions   Drink plenty of fluids-water preferably, eat a heart healthy diet, get plenty of sleep and do warm salt water gargles twice a day until feeling better; pt informed to stay in quarantine until results of covid19 testing back. Pt verb. Understanding.      Return if symptoms worsen or fail to improve.

## 2021-12-20 NOTE — PATIENT INSTRUCTIONS
Drink plenty of fluids-water preferably, eat a heart healthy diet, get plenty of sleep and do warm salt water gargles twice a day until feeling better; pt informed to stay in quarantine until results of covid19 testing back. Pt verb. Understanding.

## 2021-12-21 LAB
LABCORP SARS-COV-2, NAA 2 DAY TAT: NORMAL
SARS-COV-2 RNA RESP QL NAA+PROBE: NOT DETECTED

## 2021-12-29 RX ORDER — METOPROLOL SUCCINATE 25 MG/1
25 TABLET, EXTENDED RELEASE ORAL 2 TIMES DAILY
Qty: 180 TABLET | Refills: 3 | Status: SHIPPED | OUTPATIENT
Start: 2021-12-29 | End: 2022-02-16

## 2022-01-13 RX ORDER — AZELASTINE 1 MG/ML
SPRAY, METERED NASAL
Qty: 30 EACH | Refills: 1 | Status: SHIPPED | OUTPATIENT
Start: 2022-01-13 | End: 2022-04-18

## 2022-02-16 RX ORDER — METOPROLOL SUCCINATE 25 MG/1
25 TABLET, EXTENDED RELEASE ORAL 2 TIMES DAILY
Qty: 180 TABLET | Refills: 1 | Status: SHIPPED | OUTPATIENT
Start: 2022-02-16 | End: 2022-04-05 | Stop reason: SDUPTHER

## 2022-02-16 NOTE — TELEPHONE ENCOUNTER
RM last saw the patient on 12/8/21    Plan:       See aprn in 6 months.  Increase metoprolol succinate to 25mg bid.  No other testing at this time.        Sent in the prescription with new directions    Patient is scheduled to be seen on 6/14/22.    Renata

## 2022-04-05 ENCOUNTER — TELEPHONE (OUTPATIENT)
Dept: FAMILY MEDICINE CLINIC | Facility: CLINIC | Age: 67
End: 2022-04-05

## 2022-04-05 RX ORDER — METOPROLOL SUCCINATE 25 MG/1
25 TABLET, EXTENDED RELEASE ORAL 2 TIMES DAILY
Qty: 180 TABLET | Refills: 0 | Status: SHIPPED | OUTPATIENT
Start: 2022-04-05 | End: 2022-06-23 | Stop reason: SDUPTHER

## 2022-04-05 NOTE — TELEPHONE ENCOUNTER
Caller: Jose Ramon Doll    Relationship: Self    Best call back number:       What was the call regarding: PATIENT NOW HAS MEDICARE AND WANTED TO UPDATE RECORDS. HE WILL BE [facility] BUT IS NOT NEEDING ANY REFILLS AT THIS TIME.     Do you require a callback: NO

## 2022-04-18 RX ORDER — AZELASTINE 1 MG/ML
SPRAY, METERED NASAL
Qty: 30 EACH | Refills: 1 | Status: SHIPPED | OUTPATIENT
Start: 2022-04-18 | End: 2022-08-10 | Stop reason: SDUPTHER

## 2022-05-12 RX ORDER — ROSUVASTATIN CALCIUM 20 MG/1
20 TABLET, COATED ORAL
Qty: 90 TABLET | Refills: 3 | Status: SHIPPED | OUTPATIENT
Start: 2022-05-12 | End: 2022-08-10 | Stop reason: SDUPTHER

## 2022-05-12 NOTE — TELEPHONE ENCOUNTER
Patient called and stated that he has changed pharmacies and would like Eliquis and statin sent to [facility] on [address].    Renata

## 2022-05-13 ENCOUNTER — TELEPHONE (OUTPATIENT)
Dept: FAMILY MEDICINE CLINIC | Facility: CLINIC | Age: 67
End: 2022-05-13

## 2022-05-13 ENCOUNTER — TELEPHONE (OUTPATIENT)
Dept: CARDIOLOGY | Facility: CLINIC | Age: 67
End: 2022-05-13

## 2022-05-13 NOTE — TELEPHONE ENCOUNTER
PATIENT CALLING TO SEE IF OFFICE HAS ANY SAMPLES OF apixaban (Eliquis) 5 MG tablet tablet  PLEASE CALL AND ADVISE      CALLBACK #  [phone]

## 2022-05-13 NOTE — TELEPHONE ENCOUNTER
Incoming call:     Pt reports that he went to  his Eliquis and the pharmacy informed him that the cost would be $700.  I called pt to discuss, he thinks that the pharmacy may not have his supplemental insurance.  Pt will contact the insurance and pharmacy to ensure that they have both his medicare and supplemental insurance as well as to discuss his deductible.  Pt was able to  samples at his pcp office.

## 2022-06-01 ENCOUNTER — TELEPHONE (OUTPATIENT)
Dept: FAMILY MEDICINE CLINIC | Facility: CLINIC | Age: 67
End: 2022-06-01

## 2022-06-01 NOTE — TELEPHONE ENCOUNTER
Caller: Jose Ramon Doll    Relationship to patient: Self    Best call back number: [phone]    PATIENT WOULD LIKE TO SCHEDULE LABS BEFORE HIS MEDICARE WELLNESS VISIT. PLEASE ADVISE.

## 2022-06-03 ENCOUNTER — OFFICE VISIT (OUTPATIENT)
Dept: ORTHOPEDIC SURGERY | Facility: CLINIC | Age: 67
End: 2022-06-03

## 2022-06-03 VITALS — TEMPERATURE: 97.3 F | HEIGHT: 72 IN | BODY MASS INDEX: 37.78 KG/M2 | WEIGHT: 278.9 LBS

## 2022-06-03 DIAGNOSIS — M17.10 ARTHRITIS OF KNEE: ICD-10-CM

## 2022-06-03 DIAGNOSIS — R52 PAIN: Primary | ICD-10-CM

## 2022-06-03 PROCEDURE — 73562 X-RAY EXAM OF KNEE 3: CPT | Performed by: ORTHOPAEDIC SURGERY

## 2022-06-03 PROCEDURE — 99213 OFFICE O/P EST LOW 20 MIN: CPT | Performed by: ORTHOPAEDIC SURGERY

## 2022-06-03 PROCEDURE — 20610 DRAIN/INJ JOINT/BURSA W/O US: CPT | Performed by: ORTHOPAEDIC SURGERY

## 2022-06-03 RX ORDER — TIMOLOL MALEATE 5 MG/ML
SOLUTION/ DROPS OPHTHALMIC EVERY 12 HOURS
COMMUNITY
End: 2022-06-23

## 2022-06-03 RX ORDER — DEXTROAMPHETAMINE SACCHARATE, AMPHETAMINE ASPARTATE, DEXTROAMPHETAMINE SULFATE AND AMPHETAMINE SULFATE 5; 5; 5; 5 MG/1; MG/1; MG/1; MG/1
1 TABLET ORAL
COMMUNITY
End: 2022-06-23 | Stop reason: SDUPTHER

## 2022-06-03 RX ORDER — ROSUVASTATIN 20 MG/1
1 CAPSULE ORAL DAILY
COMMUNITY
End: 2022-06-23 | Stop reason: SDUPTHER

## 2022-06-03 RX ADMIN — LIDOCAINE HYDROCHLORIDE 2 ML: 20 INJECTION, SOLUTION EPIDURAL; INFILTRATION; INTRACAUDAL; PERINEURAL at 14:31

## 2022-06-03 RX ADMIN — METHYLPREDNISOLONE ACETATE 80 MG: 80 INJECTION, SUSPENSION INTRA-ARTICULAR; INTRALESIONAL; INTRAMUSCULAR; SOFT TISSUE at 14:31

## 2022-06-03 NOTE — PROGRESS NOTES
CC:  Right knee pain    Mr. Doll is seen for his right knee.  He is roughly 3 years out from a scope debridement.  He says the knee did great after that surgery.  He was fine until around April.  He was walking around with a heavy backpack on his back at that time.  It aggravated the knee.  The knee has continued to bother him ever since.  Pain is mild to moderate and aching.  Localizes pain to the medial aspect of the knee.    Right knee is examined.  Skin is benign.  No atrophy, swellings, or masses.  Focal tenderness along the medial joint line.  There is a trace effusion.  Knee motion is full.  No instability.  Good strength with hip flexion, knee extension, ankle and great toe plantar flexion and dorsiflexion.  Sensation is intact distally.  Brisk capillary refill in the toes.  Palpable pedal pulses.  Good skin turgor.    AP, merchant's and lateral views of the right knee are ordered and reviewed evaluate his complaint.  These compared to previous x-rays.  Relative to previous films, there is diminution of the medial joint space consistent with early osteoarthritis.  Joint space now measures 1.5 mm.  He also appears to have mild patellofemoral osteoarthritis.    Assessment: Right knee osteoarthritis    Plan: I showed him the x-rays and we discussed the significance of the findings.  We discussed treatment options available to him at this time.  He elected to try an intra-articular cortisone injection.  The risk, benefits and alternatives were discussed.  He consented and the injection was performed as described below.    Dwaine Connolly MD      Large Joint Arthrocentesis: R knee  Date/Time: 6/3/2022 2:31 PM  Consent given by: patient  Site marked: site marked  Timeout: Immediately prior to procedure a time out was called to verify the correct patient, procedure, equipment, support staff and site/side marked as required   Supporting Documentation  Indications: pain and joint swelling   Procedure  Details  Location: knee - R knee  Preparation: Patient was prepped and draped in the usual sterile fashion  Needle gauge: 21G.  Approach: anteromedial  Medications administered: 80 mg methylPREDNISolone acetate 80 MG/ML; 2 mL lidocaine PF 2% 2 %  Patient tolerance: patient tolerated the procedure well with no immediate complications

## 2022-06-06 RX ORDER — METHYLPREDNISOLONE ACETATE 80 MG/ML
80 INJECTION, SUSPENSION INTRA-ARTICULAR; INTRALESIONAL; INTRAMUSCULAR; SOFT TISSUE
Status: COMPLETED | OUTPATIENT
Start: 2022-06-03 | End: 2022-06-03

## 2022-06-06 RX ORDER — LIDOCAINE HYDROCHLORIDE 20 MG/ML
2 INJECTION, SOLUTION EPIDURAL; INFILTRATION; INTRACAUDAL; PERINEURAL
Status: COMPLETED | OUTPATIENT
Start: 2022-06-03 | End: 2022-06-03

## 2022-06-21 RX ORDER — VALSARTAN 160 MG/1
160 TABLET ORAL 2 TIMES DAILY
Qty: 180 TABLET | Refills: 2 | Status: SHIPPED | OUTPATIENT
Start: 2022-06-21 | End: 2022-09-28 | Stop reason: SDUPTHER

## 2022-06-21 NOTE — TELEPHONE ENCOUNTER
Caller: Jose Ramon Doll    Relationship: Self    Best call back number: [phone]    Requested Prescriptions:   Requested Prescriptions     Pending Prescriptions Disp Refills   • valsartan (DIOVAN) 160 MG tablet 180 tablet 2     Sig: Take 1 tablet by mouth 2 (Two) Times a Day.        Pharmacy where request should be sent: [facility] [address] AT [facility] [phone] [facility] [phone] FX     Additional details provided by patient: OUT OF MEDICATION     Does the patient have less than a 3 day supply:  [x] Yes  [] No    Rico Munroe Rep   06/21/22 14:25 EDT

## 2022-06-23 ENCOUNTER — OFFICE VISIT (OUTPATIENT)
Dept: CARDIOLOGY | Facility: CLINIC | Age: 67
End: 2022-06-23

## 2022-06-23 VITALS
WEIGHT: 276 LBS | HEART RATE: 75 BPM | SYSTOLIC BLOOD PRESSURE: 136 MMHG | DIASTOLIC BLOOD PRESSURE: 88 MMHG | BODY MASS INDEX: 37.38 KG/M2 | HEIGHT: 72 IN

## 2022-06-23 DIAGNOSIS — I44.0 FIRST DEGREE AV BLOCK: ICD-10-CM

## 2022-06-23 DIAGNOSIS — I48.0 PAROXYSMAL ATRIAL FIBRILLATION: Primary | ICD-10-CM

## 2022-06-23 DIAGNOSIS — I10 PRIMARY HYPERTENSION: ICD-10-CM

## 2022-06-23 DIAGNOSIS — I49.3 PVC'S (PREMATURE VENTRICULAR CONTRACTIONS): ICD-10-CM

## 2022-06-23 DIAGNOSIS — G47.33 OBSTRUCTIVE SLEEP APNEA: ICD-10-CM

## 2022-06-23 DIAGNOSIS — I63.9 CEREBROVASCULAR ACCIDENT (CVA), UNSPECIFIED MECHANISM: ICD-10-CM

## 2022-06-23 PROBLEM — S83.241A ACUTE MEDIAL MENISCUS TEAR OF RIGHT KNEE: Status: ACTIVE | Noted: 2019-01-28

## 2022-06-23 PROCEDURE — 99214 OFFICE O/P EST MOD 30 MIN: CPT | Performed by: NURSE PRACTITIONER

## 2022-06-23 PROCEDURE — 93000 ELECTROCARDIOGRAM COMPLETE: CPT | Performed by: NURSE PRACTITIONER

## 2022-06-23 RX ORDER — METOPROLOL SUCCINATE 25 MG/1
25 TABLET, EXTENDED RELEASE ORAL 2 TIMES DAILY
Qty: 180 TABLET | Refills: 3 | Status: SHIPPED | OUTPATIENT
Start: 2022-06-23 | End: 2022-10-25 | Stop reason: SDUPTHER

## 2022-06-23 NOTE — PROGRESS NOTES
Date of Office Visit: 2022  Encounter Provider: JENNIFER Goff  Place of Service: [facility] CARDIOLOGY  Patient Name: Jose Ramon Doll  :1955  Primary Cardiologist: Dr. Daria Daniels     Chief Complaint   Patient presents with   • Atrial Fibrillation   • Follow-up   :     HPI: Jose Ramon Doll is a pleasant 67 y.o. male who presents today for follow-up on atrial fibrillation and PVCs.  He is a new patient to me and I reviewed his medical records.    He has been diagnosed with hypertension, GERD, narcolepsy, renal calculi, and obstructive sleep apnea on BiPAP.    In 2020, he presented to [facility] ED and suffered a right ventral pontine stroke felt to be due to hypertension.  Echocardiogram showed normal LVEF, mild LVH, and aorta measuring 3.8 cm.  24-hour Holter showed normal sinus rhythm with PVCs.    In 2021, he presented to the ED with weakness and was diagnosed with PVCs in bigeminal pattern.  In 2021, he wore a 14-day Holter monitor which showed paroxysmal atrial fibrillation and he was started on apixaban.  He has a known first-degree AV block.    He presents today for 6-month follow-up visit.  He has noticed palpitations with exertion.  He says some episodes will last 20 minutes and 1 episode lasted 45 minutes.  He occasionally experiences some dyspnea with exertion and lightheadedness with position changes.  He denies chest pain, edema, syncope, or bleeding.  He has lost 10 to 15 pounds.  His blood pressure is elevated today and he is out of his valsartan.  He is going to pick it up today.      Past Medical History:   Diagnosis Date   • Abnormal EKG     IN PAST   • Allergic    • Arthritis    • ED (erectile dysfunction)    • First degree AV block    • GERD (gastroesophageal reflux disease)    • Hypertension     IN PAST  NO MEDS   • Kidney stone    • Narcolepsy    • Paroxysmal atrial fibrillation (HCC)    • PONV  (postoperative nausea and vomiting)     1969 AND 1998   • PVCs (premature ventricular contractions)    • Sleep apnea        Past Surgical History:   Procedure Laterality Date   • ANAL FISTULOTOMY     • APPENDECTOMY     • BACK SURGERY      CERVICAL   • COLONOSCOPY     • KNEE ARTHROSCOPY Right 2/12/2019    Procedure: Knee Arthroscopy with PARTICIAL MEDIAL Menisectomy;  Surgeon: Dwaine Connolly MD;  Location: [facility] OR [facility];  Service: Orthopedics   • NECK SURGERY     • WISDOM TOOTH EXTRACTION         Social History     Socioeconomic History   • Marital status:    Tobacco Use   • Smoking status: Never Smoker   • Smokeless tobacco: Former User     Types: Chew     Quit date: 2020   • Tobacco comment: Caffeine use: daily   Vaping Use   • Vaping Use: Never used   Substance and Sexual Activity   • Alcohol use: Not Currently     Alcohol/week: 0.0 - 2.0 standard drinks   • Drug use: No   • Sexual activity: Defer       Family History   Problem Relation Age of Onset   • Diabetes Mother    • Thyroid disease Mother    • Hypertension Mother    • Diabetes Father    • Heart disease Father    • Hypertension Father    • Atrial fibrillation Father    • Cancer Father    • Stroke Maternal Grandfather    • Stroke Paternal Grandfather    • Malig Hyperthermia Neg Hx        The following portion of the patient's history were reviewed and updated as appropriate: past medical history, past surgical history, past social history, past family history, allergies, current medications, and problem list.    Review of Systems   Constitutional: Positive for weight loss.   Cardiovascular: Positive for dyspnea on exertion, irregular heartbeat and palpitations.   Respiratory: Positive for shortness of breath.    Hematologic/Lymphatic: Negative.    Musculoskeletal: Positive for joint pain.   Neurological: Positive for excessive daytime sleepiness and light-headedness.       Allergies   Allergen Reactions   • Clarithromycin Other (See Comments)      "Unable to eat    • Sulfa Antibiotics Myalgia         Current Outpatient Medications:   •  amphetamine-dextroamphetamine (ADDERALL) 20 MG tablet, Take 20 mg by mouth Daily. Take 2 tablets by mouth every morning and 1 tablet by mouth at noon, Disp: , Rfl:   •  apixaban (Eliquis) 5 MG tablet tablet, Take 1 tablet by mouth Every 12 (Twelve) Hours for 90 days., Disp: 180 tablet, Rfl: 3  •  azelastine (ASTELIN) 0.1 % nasal spray, USE 2 SPRAYS INTO EACH NOSTRIL TWICE A DAY, Disp: 30 each, Rfl: 1  •  brimonidine-timolol (COMBIGAN) 0.2-0.5 % ophthalmic solution, 1 drop Every 12 (Twelve) Hours., Disp: , Rfl:   •  fluticasone (FLONASE) 50 MCG/ACT nasal spray, 2 sprays into the nostril(s) as directed by provider Every Night., Disp: , Rfl:   •  metoprolol succinate XL (TOPROL-XL) 25 MG 24 hr tablet, Take 1 tablet by mouth 2 (Two) Times a Day., Disp: 180 tablet, Rfl: 3  •  modafinil (PROVIGIL) 100 MG tablet, Take 100 mg by mouth Daily., Disp: , Rfl:   •  rosuvastatin (CRESTOR) 20 MG tablet, Take 1 tablet by mouth every night at bedtime for 90 days., Disp: 90 tablet, Rfl: 3  •  valsartan (DIOVAN) 160 MG tablet, Take 1 tablet by mouth 2 (Two) Times a Day., Disp: 180 tablet, Rfl: 2        Objective:     Vitals:    06/23/22 0803 06/23/22 0818   BP: 130/90 136/88   BP Location: Left arm Right arm   Pulse: 75    Weight: 125 kg (276 lb)    Height: 182.9 cm (72\")      Body mass index is 37.43 kg/m².    PHYSICAL EXAM:    Vitals Reviewed.   General Appearance: No acute distress, well developed and well nourished. Obese.   Eyes: Conjunctiva and lids: No erythema, swelling, or discharge. Sclera non-icteric. Glasses.   HENT: Atraumatic, normocephalic. External eyes, ears, and nose normal. No hearing loss noted. Mucous membranes normal. Lips not cyanotic. Neck supple with no tenderness. Wearing mask.   Respiratory: No signs of respiratory distress. Respiration rhythm and depth normal.   Clear to auscultation. No rales, crackles, rhonchi, or " wheezing auscultated.   Cardiovascular:  Jugular Venous Pressure: Normal  Heart Rate and Rhythm: Normal, Heart Sounds: Normal S1 and S2. No S3 or S4 noted.  Murmurs: No murmurs noted. No rubs, thrills, or gallops.   Lower Extremities: No edema noted.  Gastrointestinal:  Abdomen soft, non-distended, non-tender.    Musculoskeletal: Normal movement of extremities.  Skin and Nails: General appearance normal. No pallor, cyanosis, diaphoresis. Skin temperature normal. No clubbing of fingernails.   Psychiatric: Patient alert and oriented to person, place, and time. Speech and behavior appropriate. Normal mood and affect.       ECG 12 Lead    Date/Time: 6/23/2022 8:14 AM  Performed by: Lexi Andres APRN  Authorized by: Lexi Andres APRN   Comparison: compared with previous ECG from 12/8/2021  Similar to previous ECG  Rhythm: sinus rhythm  Ectopy: unifocal PVCs  Rate: normal  BPM: 80  Conduction: 1st degree AV block  ST Segments: ST segments normal  T Waves: T waves normal  QRS axis: normal    Clinical impression: abnormal EKG              Assessment:       Diagnosis Plan   1. Paroxysmal atrial fibrillation (HCC)     2. PVC's (premature ventricular contractions)     3. First degree AV block     4. Cerebrovascular accident (CVA), unspecified mechanism (HCC)     5. Primary hypertension     6. Obstructive sleep apnea            Plan:       1.  Paroxysmal Atrial Fibrillation: Diagnosed per 14-day Holter monitor in March 2021.  Occasionally experiences palpitations and irregular heartbeats.  Continue metoprolol and apixaban.  He said the apixaban may be too expensive and may switch to rivaroxaban in a couple of months.  He will call our office when he wants to do so.    Atrial Fibrillation and Atrial Flutter  Assessment  • The patient has paroxysmal atrial fibrillation  • This is non-valvular in etiology  • The patient's CHADS2-VASc score is 4  • A [id number] score of 2 or more is considered a high risk for a  thromboembolic event      2.  PVCs: Noted on EKG today.  Continue beta-blocker.    3.  First-degree AV block: Chronic and unchanged.    4.  Previous stroke in November 2020 felt to be due to hypertension and may have been due to paroxysmal atrial fibrillation.    5.  Obstructive sleep apnea: Compliant with BiPAP machine.     6.  I recommended a 1 year follow-up visit with Dr. Daniels.    As always, it has been a pleasure to participate in your patient's care. Thank you.       Sincerely,         JENNIFER Iniguez  [facility] Cardiology      · Dictated utilizing Dragon Dictation  · COVID-19 Precautions - Patient was compliant in wearing a mask. When I saw the patient, I used appropriate personal protective equipment (PPE) including mask and eye shield (standard procedure).  Additionally, I used gown and gloves if indicated.  Hand hygiene was completed before and after seeing the patient.

## 2022-07-05 ENCOUNTER — TELEPHONE (OUTPATIENT)
Dept: CARDIOLOGY | Facility: CLINIC | Age: 67
End: 2022-07-05

## 2022-07-05 NOTE — TELEPHONE ENCOUNTER
Patient called and stated that he is scheduled to have cataract surgery next month at the [facility] [place].  He is wanting to know if it would be ok for him to be off of his Eliquis for 2 day before.  Please advise.    CB: [phone]    Thanks,  Renata

## 2022-07-07 NOTE — TELEPHONE ENCOUNTER
Patient called back and stated that this should be sent to Loree Napier at fax #: [phone]    I have received confirmation that it went through.    Renata

## 2022-08-03 DIAGNOSIS — E78.5 HYPERLIPIDEMIA, UNSPECIFIED HYPERLIPIDEMIA TYPE: ICD-10-CM

## 2022-08-03 DIAGNOSIS — R73.03 PREDIABETES: Primary | ICD-10-CM

## 2022-08-03 DIAGNOSIS — Z12.5 SPECIAL SCREENING FOR MALIGNANT NEOPLASM OF PROSTATE: ICD-10-CM

## 2022-08-06 LAB
ALBUMIN SERPL-MCNC: 4.3 G/DL (ref 3.8–4.8)
ALBUMIN/GLOB SERPL: 2.2 {RATIO} (ref 1.2–2.2)
ALP SERPL-CCNC: 100 IU/L (ref 44–121)
ALT SERPL-CCNC: 27 IU/L (ref 0–44)
APPEARANCE UR: CLEAR
AST SERPL-CCNC: 14 IU/L (ref 0–40)
BACTERIA #/AREA URNS HPF: NORMAL /[HPF]
BASOPHILS # BLD AUTO: 0 X10E3/UL (ref 0–0.2)
BASOPHILS NFR BLD AUTO: 1 %
BILIRUB SERPL-MCNC: 0.3 MG/DL (ref 0–1.2)
BILIRUB UR QL STRIP: NEGATIVE
BUN SERPL-MCNC: 10 MG/DL (ref 8–27)
BUN/CREAT SERPL: 11 (ref 10–24)
CALCIUM SERPL-MCNC: 8.8 MG/DL (ref 8.6–10.2)
CASTS URNS QL MICRO: NORMAL /LPF
CHLORIDE SERPL-SCNC: 100 MMOL/L (ref 96–106)
CHOLEST SERPL-MCNC: 139 MG/DL (ref 100–199)
CO2 SERPL-SCNC: 25 MMOL/L (ref 20–29)
COLOR UR: YELLOW
CREAT SERPL-MCNC: 0.88 MG/DL (ref 0.76–1.27)
EGFRCR SERPLBLD CKD-EPI 2021: 95 ML/MIN/1.73
EOSINOPHIL # BLD AUTO: 0.2 X10E3/UL (ref 0–0.4)
EOSINOPHIL NFR BLD AUTO: 3 %
EPI CELLS #/AREA URNS HPF: NORMAL /HPF (ref 0–10)
ERYTHROCYTE [DISTWIDTH] IN BLOOD BY AUTOMATED COUNT: 13.6 % (ref 11.6–15.4)
GLOBULIN SER CALC-MCNC: 2 G/DL (ref 1.5–4.5)
GLUCOSE SERPL-MCNC: 112 MG/DL (ref 65–99)
GLUCOSE UR QL STRIP: NEGATIVE
HBA1C MFR BLD: 6.2 % (ref 4.8–5.6)
HCT VFR BLD AUTO: 46 % (ref 37.5–51)
HDLC SERPL-MCNC: 51 MG/DL
HGB BLD-MCNC: 15.7 G/DL (ref 13–17.7)
HGB UR QL STRIP: NEGATIVE
IMM GRANULOCYTES # BLD AUTO: 0 X10E3/UL (ref 0–0.1)
IMM GRANULOCYTES NFR BLD AUTO: 1 %
KETONES UR QL STRIP: NEGATIVE
LDLC SERPL CALC-MCNC: 68 MG/DL (ref 0–99)
LDLC/HDLC SERPL: 1.3 RATIO (ref 0–3.6)
LEUKOCYTE ESTERASE UR QL STRIP: NEGATIVE
LYMPHOCYTES # BLD AUTO: 1.4 X10E3/UL (ref 0.7–3.1)
LYMPHOCYTES NFR BLD AUTO: 21 %
MCH RBC QN AUTO: 30.8 PG (ref 26.6–33)
MCHC RBC AUTO-ENTMCNC: 34.1 G/DL (ref 31.5–35.7)
MCV RBC AUTO: 90 FL (ref 79–97)
MICRO URNS: NORMAL
MICRO URNS: NORMAL
MONOCYTES # BLD AUTO: 0.5 X10E3/UL (ref 0.1–0.9)
MONOCYTES NFR BLD AUTO: 7 %
NEUTROPHILS # BLD AUTO: 4.5 X10E3/UL (ref 1.4–7)
NEUTROPHILS NFR BLD AUTO: 67 %
NITRITE UR QL STRIP: NEGATIVE
PH UR STRIP: 5.5 [PH] (ref 5–7.5)
PLATELET # BLD AUTO: 162 X10E3/UL (ref 150–450)
POTASSIUM SERPL-SCNC: 4.3 MMOL/L (ref 3.5–5.2)
PROT SERPL-MCNC: 6.3 G/DL (ref 6–8.5)
PROT UR QL STRIP: NORMAL
PSA SERPL-MCNC: 0.6 NG/ML (ref 0–4)
RBC # BLD AUTO: 5.1 X10E6/UL (ref 4.14–5.8)
RBC #/AREA URNS HPF: NORMAL /HPF (ref 0–2)
SODIUM SERPL-SCNC: 138 MMOL/L (ref 134–144)
SP GR UR STRIP: 1.02 (ref 1–1.03)
TRIGL SERPL-MCNC: 110 MG/DL (ref 0–149)
UROBILINOGEN UR STRIP-MCNC: 1 MG/DL (ref 0.2–1)
VLDLC SERPL CALC-MCNC: 20 MG/DL (ref 5–40)
WBC # BLD AUTO: 6.6 X10E3/UL (ref 3.4–10.8)
WBC #/AREA URNS HPF: NORMAL /HPF (ref 0–5)

## 2022-08-10 ENCOUNTER — OFFICE VISIT (OUTPATIENT)
Dept: FAMILY MEDICINE CLINIC | Facility: CLINIC | Age: 67
End: 2022-08-10

## 2022-08-10 VITALS
BODY MASS INDEX: 37.79 KG/M2 | WEIGHT: 279 LBS | OXYGEN SATURATION: 96 % | DIASTOLIC BLOOD PRESSURE: 84 MMHG | HEIGHT: 72 IN | SYSTOLIC BLOOD PRESSURE: 142 MMHG | RESPIRATION RATE: 18 BRPM | TEMPERATURE: 98.4 F | HEART RATE: 82 BPM

## 2022-08-10 DIAGNOSIS — R73.03 PREDIABETES: ICD-10-CM

## 2022-08-10 DIAGNOSIS — G47.33 OBSTRUCTIVE SLEEP APNEA: ICD-10-CM

## 2022-08-10 DIAGNOSIS — Z00.00 MEDICARE ANNUAL WELLNESS VISIT, SUBSEQUENT: ICD-10-CM

## 2022-08-10 DIAGNOSIS — I48.0 PAROXYSMAL ATRIAL FIBRILLATION: ICD-10-CM

## 2022-08-10 DIAGNOSIS — E66.01 CLASS 2 SEVERE OBESITY DUE TO EXCESS CALORIES WITH SERIOUS COMORBIDITY AND BODY MASS INDEX (BMI) OF 35.0 TO 35.9 IN ADULT: ICD-10-CM

## 2022-08-10 DIAGNOSIS — I10 PRIMARY HYPERTENSION: Primary | ICD-10-CM

## 2022-08-10 PROCEDURE — 99214 OFFICE O/P EST MOD 30 MIN: CPT | Performed by: INTERNAL MEDICINE

## 2022-08-10 PROCEDURE — 90677 PCV20 VACCINE IM: CPT | Performed by: INTERNAL MEDICINE

## 2022-08-10 PROCEDURE — G0009 ADMIN PNEUMOCOCCAL VACCINE: HCPCS | Performed by: INTERNAL MEDICINE

## 2022-08-10 RX ORDER — TIMOLOL MALEATE 5 MG/ML
SOLUTION/ DROPS OPHTHALMIC
COMMUNITY
Start: 2022-07-22 | End: 2022-12-14 | Stop reason: SDUPTHER

## 2022-08-10 RX ORDER — BRIMONIDINE TARTRATE 2 MG/ML
SOLUTION/ DROPS OPHTHALMIC
COMMUNITY
Start: 2022-07-24 | End: 2022-12-14

## 2022-08-10 RX ORDER — AZELASTINE 1 MG/ML
2 SPRAY, METERED NASAL 2 TIMES DAILY
Qty: 90 ML | Refills: 3 | Status: SHIPPED | OUTPATIENT
Start: 2022-08-10

## 2022-08-10 RX ORDER — ROSUVASTATIN CALCIUM 20 MG/1
20 TABLET, COATED ORAL
Qty: 90 TABLET | Refills: 3 | Status: SHIPPED | OUTPATIENT
Start: 2022-08-10 | End: 2023-01-15 | Stop reason: SDUPTHER

## 2022-08-10 RX ORDER — FLUTICASONE PROPIONATE 50 MCG
2 SPRAY, SUSPENSION (ML) NASAL DAILY
Qty: 18.2 ML | Refills: 3 | Status: SHIPPED | OUTPATIENT
Start: 2022-08-10 | End: 2022-10-25 | Stop reason: SDUPTHER

## 2022-08-10 NOTE — PROGRESS NOTES
The ABCs of the Annual Wellness Visit  Subsequent Medicare Wellness Visit    Chief Complaint   Patient presents with   • Medicare Wellness-subsequent     Wellness      Subjective    History of Present Illness:  Jose Ramon Doll is a 67 y.o. male who presents for a Subsequent Medicare Wellness Visit.    The following portions of the patient's history were reviewed and   updated as appropriate: allergies, current medications, past family history, past medical history, past social history, past surgical history and problem list.    Compared to one year ago, the patient feels his physical   health is the same.    Compared to one year ago, the patient feels his mental   health is better.    Recent Hospitalizations:  He was not admitted to the hospital during the last year.       Current Medical Providers:  Patient Care Team:  Vasyl Urena MD as PCP - General (Internal Medicine)  Ray Pearson MD as Consulting Physician (Pulmonary Disease)  Darai Daniels MD as Consulting Physician (Cardiology)    Outpatient Medications Prior to Visit   Medication Sig Dispense Refill   • amphetamine-dextroamphetamine (ADDERALL) 20 MG tablet Take 20 mg by mouth Daily. Take 2 tablets by mouth every morning and 1 tablet by mouth at noon     • apixaban (Eliquis) 5 MG tablet tablet Take 1 tablet by mouth Every 12 (Twelve) Hours for 90 days. 180 tablet 3   • brimonidine (ALPHAGAN) 0.2 % ophthalmic solution      • brimonidine-timolol (COMBIGAN) 0.2-0.5 % ophthalmic solution 1 drop Every 12 (Twelve) Hours.     • metoprolol succinate XL (TOPROL-XL) 25 MG 24 hr tablet Take 1 tablet by mouth 2 (Two) Times a Day. 180 tablet 3   • modafinil (PROVIGIL) 100 MG tablet Take 100 mg by mouth Daily.     • timolol (TIMOPTIC) 0.5 % ophthalmic solution      • valsartan (DIOVAN) 160 MG tablet Take 1 tablet by mouth 2 (Two) Times a Day. 180 tablet 2   • azelastine (ASTELIN) 0.1 % nasal spray USE 2 SPRAYS INTO EACH NOSTRIL TWICE A DAY 30  each 1   • fluticasone (FLONASE) 50 MCG/ACT nasal spray 2 sprays into the nostril(s) as directed by provider Every Night.     • rosuvastatin (CRESTOR) 20 MG tablet Take 1 tablet by mouth every night at bedtime for 90 days. 90 tablet 3     No facility-administered medications prior to visit.       No opioid medication identified on active medication list. I have reviewed chart for other potential  high risk medication/s and harmful drug interactions in the elderly.          Aspirin is not on active medication list.  Aspirin use is not indicated based on review of current medical condition/s. Risk of harm outweighs potential benefits.  .    Patient Active Problem List   Diagnosis   • Allergic rhinitis   • Cervical spinal stenosis   • ED (erectile dysfunction) of non-organic origin   • Hypertension   • Lumbar radiculopathy   • Neoplasm of skin   • Obstructive sleep apnea   • Burning or prickling sensation   • Neck pain   • Benign non-nodular prostatic hyperplasia with lower urinary tract symptoms   • Shoulder pain, acute   • Urinary frequency   • Chronic pain of right knee   • Rotator cuff tendonitis   • Acute medial meniscus tear of right knee   • Prediabetes   • Class 2 severe obesity due to excess calories with serious comorbidity in adult (HCC)   • Other fatigue   • Paroxysmal atrial fibrillation ([facility])   • PVC's (premature ventricular contractions)   • H/O TIA (transient ischemic attack) and stroke   • CVA (cerebral vascular accident) ([facility])   • Excessive daytime sleepiness   • Impacted cerumen of both ears   • Narcolepsy without cataplexy   • Acute medial meniscus tear of right knee   • First degree AV block   • Medicare annual wellness visit, subsequent     Advance Care Planning  Advance Directive is not on file.  ACP discussion was held with the patient during this visit. Patient does not have an advance directive, information provided.    Review of Systems   Constitutional: Negative.    HENT: Negative.   "  Respiratory: Negative.    Cardiovascular: Negative.    Musculoskeletal: Negative.    Psychiatric/Behavioral: Negative.         Objective    Vitals:    08/10/22 1512   BP: 142/84   Pulse: 82   Resp: 18   Temp: 98.4 °F (36.9 °C)   TempSrc: Temporal   SpO2: 96%   Weight: 127 kg (279 lb)   Height: 182.9 cm (72.01\")     Estimated body mass index is 37.83 kg/m² as calculated from the following:    Height as of this encounter: 182.9 cm (72.01\").    Weight as of this encounter: 127 kg (279 lb).    Class 2 Severe Obesity (BMI >=35 and <=39.9). Obesity-related health conditions include the following: hypertension. Obesity is unchanged. BMI is is above average; BMI management plan is completed. We discussed portion control and increasing exercise.      Does the patient have evidence of cognitive impairment? No    Physical Exam  Vitals and nursing note reviewed.   Constitutional:       Appearance: Normal appearance. He is normal weight.      Comments: Pleasant, neatly groomed, no distress.   Neck:      Vascular: No carotid bruit.   Cardiovascular:      Rate and Rhythm: Regular rhythm.      Heart sounds: Normal heart sounds. No murmur heard.    No gallop.   Pulmonary:      Effort: No respiratory distress.      Breath sounds: No wheezing or rales.   Neurological:      General: No focal deficit present.      Mental Status: He is alert and oriented to person, place, and time.   Psychiatric:         Mood and Affect: Mood normal.         Behavior: Behavior normal.         Thought Content: Thought content normal.       Lab Results   Component Value Date    CHLPL 139 08/05/2022    TRIG 110 08/05/2022    HDL 51 08/05/2022    LDL 68 08/05/2022    VLDL 20 08/05/2022    HGBA1C 6.2 (H) 08/05/2022            HEALTH RISK ASSESSMENT    Smoking Status:  Social History     Tobacco Use   Smoking Status Never Smoker   Smokeless Tobacco Former User   • Types: Chew   • Quit date: 2020   Tobacco Comment    Caffeine use: daily     Alcohol " Consumption:  Social History     Substance and Sexual Activity   Alcohol Use Not Currently   • Alcohol/week: 0.0 - 2.0 standard drinks     Fall Risk Screen:    ROSALEEADI Fall Risk Assessment was completed, and patient is at HIGH risk for falls. Assessment completed on:8/10/2022    Depression Screening:  PHQ-2/PHQ-9 Depression Screening 8/10/2022   Retired PHQ-9 Total Score -   Retired Total Score -   Little Interest or Pleasure in Doing Things 0-->not at all   Feeling Down, Depressed or Hopeless 0-->not at all   PHQ-9: Brief Depression Severity Measure Score 0       Health Habits and Functional and Cognitive Screening:  Functional & Cognitive Status 8/10/2022   Do you have difficulty preparing food and eating? No   Do you have difficulty bathing yourself, getting dressed or grooming yourself? No   Do you have difficulty using the toilet? No   Do you have difficulty moving around from place to place? No   Do you have trouble with steps or getting out of a bed or a chair? No   Current Diet Well Balanced Diet   Dental Exam Up to date   Eye Exam Up to date   Exercise (times per week) 0 times per week   Current Exercises Include No Regular Exercise   Do you need help using the phone?  No   Are you deaf or do you have serious difficulty hearing?  No   Do you need help with transportation? No   Do you need help shopping? No   Do you need help preparing meals?  No   Do you need help with housework?  No   Do you need help with laundry? No   Do you need help taking your medications? No   Do you need help managing money? No   Do you ever drive or ride in a car without wearing a seat belt? No   Have you felt unusual stress, anger or loneliness in the last month? No   Who do you live with? Spouse   If you need help, do you have trouble finding someone available to you? No   Have you been bothered in the last four weeks by sexual problems? No   Do you have difficulty concentrating, remembering or making decisions? No        Age-appropriate Screening Schedule:  Refer to the list below for future screening recommendations based on patient's age, sex and/or medical conditions. Orders for these recommended tests are listed in the plan section. The patient has been provided with a written plan.    Health Maintenance   Topic Date Due   • INFLUENZA VACCINE  10/01/2022   • LIPID PANEL  08/05/2023   • TDAP/TD VACCINES (2 - Td or Tdap) 09/01/2025   • ZOSTER VACCINE  Addressed              Assessment & Plan   CMS Preventative Services Quick Reference  Risk Factors Identified During Encounter  Inactivity/Sedentary  Obesity/Overweight   The above risks/problems have been discussed with the patient.  Follow up actions/plans if indicated are seen below in the Assessment/Plan Section.  Pertinent information has been shared with the patient in the After Visit Summary.    Diagnoses and all orders for this visit:    1. Primary hypertension (Primary)    2. Paroxysmal atrial fibrillation (HCC)    3. Prediabetes    4. Class 2 severe obesity due to excess calories with serious comorbidity and body mass index (BMI) of 35.0 to 35.9 in adult (HCC)    5. Medicare annual wellness visit, subsequent    6. Obstructive sleep apnea    Other orders  -     rosuvastatin (CRESTOR) 20 MG tablet; Take 1 tablet by mouth every night at bedtime for 90 days.  Dispense: 90 tablet; Refill: 3  -     azelastine (ASTELIN) 0.1 % nasal spray; 2 sprays into the nostril(s) as directed by provider 2 (Two) Times a Day. Use in each nostril as directed  Dispense: 90 mL; Refill: 3  -     fluticasone (FLONASE) 50 MCG/ACT nasal spray; 2 sprays into the nostril(s) as directed by provider Daily.  Dispense: 18.2 mL; Refill: 3  -     Pneumococcal Conjugate Vaccine 20-Valent (PCV20)    His hypercholesterolemia is well controlled.    He has paroxysmal atrial fibrillation.  Currently in normal sinus rhythm.    He has obstructive sleep apnea is compliant with the CPAP.    He is  prediabetic.    Is obese with a BMI of 37.    I have asked him to do his best to lose weight via regular aerobic activity and decrease caloric intake.    I asked him to follow-up in about 6 months.  Fasting labs prior that visit should include: Lipid profile, comprehensive metabolic panel, CBC, urinalysis.    Follow Up:   No follow-ups on file.     An After Visit Summary and PPPS were made available to the patient.

## 2022-08-16 PROBLEM — Z00.00 MEDICARE ANNUAL WELLNESS VISIT, SUBSEQUENT: Status: ACTIVE | Noted: 2022-08-16

## 2022-09-22 ENCOUNTER — TELEPHONE (OUTPATIENT)
Dept: CARDIOLOGY | Facility: CLINIC | Age: 67
End: 2022-09-22

## 2022-09-22 NOTE — TELEPHONE ENCOUNTER
Patient called and stated that he is out of Eliquis an stated that he was supposed to be changing to something else.  He would like to know how to do that.    Renata

## 2022-09-23 NOTE — TELEPHONE ENCOUNTER
Needs to stay on eliquis - I do not know what Lexi was planning. Pls arrange for him to get meds and pls refill.     rm

## 2022-09-23 NOTE — TELEPHONE ENCOUNTER
I finally found the note.  It is from her 6/23/22 note.    1. Paroxysmal Atrial Fibrillation: Diagnosed per 14-day Holter monitor in March 2021. Occasionally experiences palpitations and irregular heartbeats. Continue metoprolol and apixaban. He said the apixaban may be too expensive and may switch to rivaroxaban in a couple of months. He will call our office when he wants to do so.     Please advise if this could be sent in.    Renata

## 2022-09-28 RX ORDER — VALSARTAN 160 MG/1
160 TABLET ORAL 2 TIMES DAILY
Qty: 180 TABLET | Refills: 2 | Status: SHIPPED | OUTPATIENT
Start: 2022-09-28 | End: 2023-02-10 | Stop reason: SDUPTHER

## 2022-09-28 NOTE — TELEPHONE ENCOUNTER
Caller: Jose Ramon Doll    Relationship: Self    Best call back number: [phone]    Requested Prescriptions:   Requested Prescriptions     Pending Prescriptions Disp Refills   • valsartan (DIOVAN) 160 MG tablet 180 tablet 2     Sig: Take 1 tablet by mouth 2 (Two) Times a Day.        Pharmacy where request should be sent: [facility] [id number] [address] AT [facility] [phone] [facility] [phone] FX         Does the patient have less than a 3 day supply:  [x] Yes  [] No    Rico Álvarez Rep   09/28/22 13:36 EDT

## 2022-10-25 ENCOUNTER — TELEPHONE (OUTPATIENT)
Dept: FAMILY MEDICINE CLINIC | Facility: CLINIC | Age: 67
End: 2022-10-25

## 2022-10-25 RX ORDER — METOPROLOL SUCCINATE 25 MG/1
25 TABLET, EXTENDED RELEASE ORAL 2 TIMES DAILY
Qty: 180 TABLET | Refills: 3 | Status: SHIPPED | OUTPATIENT
Start: 2022-10-25 | End: 2023-01-27 | Stop reason: SDUPTHER

## 2022-10-25 RX ORDER — FLUTICASONE PROPIONATE 50 MCG
2 SPRAY, SUSPENSION (ML) NASAL DAILY
Qty: 18.2 ML | Refills: 3 | Status: SHIPPED | OUTPATIENT
Start: 2022-10-25

## 2022-10-25 NOTE — TELEPHONE ENCOUNTER
garrison    Caller: Jose Ramon Doll    Relationship: Self    Best call back number: [phone]    Requested Prescriptions:   Requested Prescriptions     Pending Prescriptions Disp Refills   • metoprolol succinate XL (TOPROL-XL) 25 MG 24 hr tablet 180 tablet 3     Sig: Take 1 tablet by mouth 2 (Two) Times a Day.   • fluticasone (FLONASE) 50 MCG/ACT nasal spray 18.2 mL 3     Si sprays into the nostril(s) as directed by provider Daily.        Pharmacy where request should be sent: [facility] [id number] - [facility] [address] AT [facility] - [phone]  - [phone] FX     Additional details provided by patient: PATIENT IS COMPLETELY OUT  Does the patient have less than a 3 day supply:  [x] Yes  [] No    Rico Finch Rep   10/25/22 11:44 EDT

## 2022-12-14 ENCOUNTER — OFFICE VISIT (OUTPATIENT)
Dept: ORTHOPEDIC SURGERY | Facility: CLINIC | Age: 67
End: 2022-12-14

## 2022-12-14 VITALS — HEIGHT: 72 IN | BODY MASS INDEX: 37.94 KG/M2 | WEIGHT: 280.1 LBS | TEMPERATURE: 98.9 F

## 2022-12-14 DIAGNOSIS — M25.512 LEFT SHOULDER PAIN, UNSPECIFIED CHRONICITY: Primary | ICD-10-CM

## 2022-12-14 DIAGNOSIS — M19.019 ARTHRITIS OF SHOULDER: ICD-10-CM

## 2022-12-14 PROCEDURE — 99214 OFFICE O/P EST MOD 30 MIN: CPT | Performed by: ORTHOPAEDIC SURGERY

## 2022-12-14 PROCEDURE — 20610 DRAIN/INJ JOINT/BURSA W/O US: CPT | Performed by: ORTHOPAEDIC SURGERY

## 2022-12-14 PROCEDURE — 73030 X-RAY EXAM OF SHOULDER: CPT | Performed by: ORTHOPAEDIC SURGERY

## 2022-12-14 RX ORDER — METHYLPREDNISOLONE ACETATE 80 MG/ML
80 INJECTION, SUSPENSION INTRA-ARTICULAR; INTRALESIONAL; INTRAMUSCULAR; SOFT TISSUE
Status: COMPLETED | OUTPATIENT
Start: 2022-12-14 | End: 2022-12-14

## 2022-12-14 RX ORDER — TIMOLOL MALEATE 5 MG/ML
SOLUTION/ DROPS OPHTHALMIC EVERY 12 HOURS
COMMUNITY
Start: 2022-10-25

## 2022-12-14 RX ADMIN — METHYLPREDNISOLONE ACETATE 80 MG: 80 INJECTION, SUSPENSION INTRA-ARTICULAR; INTRALESIONAL; INTRAMUSCULAR; SOFT TISSUE at 08:22

## 2022-12-14 NOTE — PROGRESS NOTES
CC:  Left shoulder pain    Mr. Dlol is seen today for his left shoulder.  I saw him for this issue about 2 years ago.  At that time I diagnosed him with both arthritis and calcific tendinitis.  We did an injection and his symptoms resolved.  Over the past few months, he has developed recurrent pain.  The symptoms were precipitated by an incident where he was reaching for something and felt a sharp pain in the shoulder a couple of months ago.  He reports moderate aching discomfort.  Most of the pain seems to be anterior.  He reports good days and bad days.  Denies any weakness or numbness.  Denies having noticed any alleviating factors.    Left shoulder is examined.  Skin is benign.  Moderate tenderness anteriorly without effusion.  He has good motion.  He has some discomfort with resisted forward elevation and abduction but no weakness.  Mild discomfort with a Petty maneuver.  More moderate discomfort with an O'Briens maneuver.    AP, scapular Y and axillary views of the left shoulder are ordered and reviewed to evaluate his complaint.  These are compared to previous x-rays.  In the interim since I last saw him the calcific tendinitis appears to have resolved.  He has moderate glenohumeral osteoarthritis.  He appears to have more narrowing and posterior subluxation of the humeral head relative to previous films.    Assessment: Worsening left shoulder osteoarthritis    Plan: We discussed his options.  I showed him the x-rays and explained the natural history of this condition.  He elected to try a glenohumeral injection today.  The risk, benefits and alternatives were discussed including his elevated risk with anticoagulation.  He consented and the injection was performed as described below.  He will follow-up as needed.      Large Joint Arthrocentesis: L glenohumeral  Date/Time: 12/14/2022 8:22 AM  Consent given by: patient  Site marked: site marked  Timeout: Immediately prior to procedure a time out was  called to verify the correct patient, procedure, equipment, support staff and site/side marked as required   Supporting Documentation  Indications: pain   Procedure Details  Location: shoulder - L glenohumeral  Preparation: Patient was prepped and draped in the usual sterile fashion  Needle gauge: 21 G.  Medications administered: 2 mL lidocaine (cardiac); 80 mg methylPREDNISolone acetate 80 MG/ML  Patient tolerance: patient tolerated the procedure well with no immediate complications          Dwaine Connolly MD

## 2023-01-03 NOTE — TELEPHONE ENCOUNTER
Patient called and stated that he had to change his pharmacy for the year to [facility] on [address]  [phone].  RX was sent in on 8/10/22 for a year.  I have sent in the remaining refills to [facility].    Renata

## 2023-01-16 RX ORDER — ROSUVASTATIN CALCIUM 20 MG/1
20 TABLET, COATED ORAL
Qty: 90 TABLET | Refills: 3 | Status: SHIPPED | OUTPATIENT
Start: 2023-01-16 | End: 2023-04-16

## 2023-01-27 RX ORDER — METOPROLOL SUCCINATE 25 MG/1
25 TABLET, EXTENDED RELEASE ORAL 2 TIMES DAILY
Qty: 180 TABLET | Refills: 3 | Status: SHIPPED | OUTPATIENT
Start: 2023-01-27

## 2023-02-10 RX ORDER — VALSARTAN 160 MG/1
160 TABLET ORAL 2 TIMES DAILY
Qty: 180 TABLET | Refills: 2 | Status: SHIPPED | OUTPATIENT
Start: 2023-02-10

## 2023-02-10 NOTE — TELEPHONE ENCOUNTER
Caller: Jose Ramon Doll    Relationship: Self    Best call back number: [phone]    Requested Prescriptions:   Requested Prescriptions     Pending Prescriptions Disp Refills   • rivaroxaban (Xarelto) 20 MG tablet 90 tablet 2     Sig: Take 1 tablet by mouth Daily With Dinner.        Pharmacy where request should be sent: [id number] [address] [place] [address]  AT [facility] [phone] [facility] [phone] FX       Does the patient have less than a 3 day supply:  [x] Yes  [] No    Would you like a call back once the refill request has been completed: [x] Yes [] No    If the office needs to give you a call back, can they leave a voicemail: [x] Yes [] No    Rico Lopez Rep   02/10/23 10:30 EST

## 2023-02-10 NOTE — TELEPHONE ENCOUNTER
Caller: Jose Ramon Doll    Relationship: Self    Best call back number: [phone]    Requested Prescriptions:   Requested Prescriptions     Pending Prescriptions Disp Refills   • valsartan (DIOVAN) 160 MG tablet 180 tablet 2     Sig: Take 1 tablet by mouth 2 (Two) Times a Day.        Pharmacy where request should be sent: [facility] #[id number] [facility] [address] [place] [address]  AT [facility] [phone] [facility] [phone] FX     Does the patient have less than a 3 day supply:  [x] Yes  [] No    Would you like a call back once the refill request has been completed: [] Yes [x] No    If the office needs to give you a call back, can they leave a voicemail: [] Yes [x] No    Rico Neal Rep   02/10/23 10:35 EST

## 2023-02-21 DIAGNOSIS — E78.5 HYPERLIPIDEMIA, UNSPECIFIED HYPERLIPIDEMIA TYPE: ICD-10-CM

## 2023-02-21 DIAGNOSIS — I10 PRIMARY HYPERTENSION: Primary | ICD-10-CM

## 2023-02-23 LAB
ALBUMIN SERPL-MCNC: 4.4 G/DL (ref 3.5–5.2)
ALBUMIN/GLOB SERPL: 2 G/DL
ALP SERPL-CCNC: 81 U/L (ref 39–117)
ALT SERPL-CCNC: 25 U/L (ref 1–41)
AST SERPL-CCNC: 9 U/L (ref 1–40)
BASOPHILS # BLD AUTO: 0.05 10*3/MM3 (ref 0–0.2)
BASOPHILS NFR BLD AUTO: 0.7 % (ref 0–1.5)
BILIRUB SERPL-MCNC: 0.5 MG/DL (ref 0–1.2)
BUN SERPL-MCNC: 12 MG/DL (ref 8–23)
BUN/CREAT SERPL: 13.3 (ref 7–25)
CALCIUM SERPL-MCNC: 9 MG/DL (ref 8.6–10.5)
CHLORIDE SERPL-SCNC: 107 MMOL/L (ref 98–107)
CHOLEST SERPL-MCNC: 116 MG/DL (ref 0–200)
CHOLEST/HDLC SERPL: 2.52 {RATIO}
CO2 SERPL-SCNC: 28.6 MMOL/L (ref 22–29)
CREAT SERPL-MCNC: 0.9 MG/DL (ref 0.76–1.27)
EGFRCR SERPLBLD CKD-EPI 2021: 94.8 ML/MIN/1.73
EOSINOPHIL # BLD AUTO: 0.22 10*3/MM3 (ref 0–0.4)
EOSINOPHIL NFR BLD AUTO: 3.2 % (ref 0.3–6.2)
ERYTHROCYTE [DISTWIDTH] IN BLOOD BY AUTOMATED COUNT: 13.5 % (ref 12.3–15.4)
GLOBULIN SER CALC-MCNC: 2.2 GM/DL
GLUCOSE SERPL-MCNC: 128 MG/DL (ref 65–99)
HCT VFR BLD AUTO: 46.4 % (ref 37.5–51)
HDLC SERPL-MCNC: 46 MG/DL (ref 40–60)
HGB BLD-MCNC: 15.8 G/DL (ref 13–17.7)
IMM GRANULOCYTES # BLD AUTO: 0.02 10*3/MM3 (ref 0–0.05)
IMM GRANULOCYTES NFR BLD AUTO: 0.3 % (ref 0–0.5)
LDLC SERPL CALC-MCNC: 51 MG/DL (ref 0–100)
LYMPHOCYTES # BLD AUTO: 1.59 10*3/MM3 (ref 0.7–3.1)
LYMPHOCYTES NFR BLD AUTO: 22.8 % (ref 19.6–45.3)
MCH RBC QN AUTO: 30 PG (ref 26.6–33)
MCHC RBC AUTO-ENTMCNC: 34.1 G/DL (ref 31.5–35.7)
MCV RBC AUTO: 88.2 FL (ref 79–97)
MONOCYTES # BLD AUTO: 0.5 10*3/MM3 (ref 0.1–0.9)
MONOCYTES NFR BLD AUTO: 7.2 % (ref 5–12)
NEUTROPHILS # BLD AUTO: 4.58 10*3/MM3 (ref 1.7–7)
NEUTROPHILS NFR BLD AUTO: 65.8 % (ref 42.7–76)
NRBC BLD AUTO-RTO: 0 /100 WBC (ref 0–0.2)
PLATELET # BLD AUTO: 165 10*3/MM3 (ref 140–450)
POTASSIUM SERPL-SCNC: 4.2 MMOL/L (ref 3.5–5.2)
PROT SERPL-MCNC: 6.6 G/DL (ref 6–8.5)
RBC # BLD AUTO: 5.26 10*6/MM3 (ref 4.14–5.8)
SODIUM SERPL-SCNC: 143 MMOL/L (ref 136–145)
TRIGL SERPL-MCNC: 100 MG/DL (ref 0–150)
UNABLE TO VOID: NORMAL
VLDLC SERPL CALC-MCNC: 19 MG/DL (ref 5–40)
WBC # BLD AUTO: 6.96 10*3/MM3 (ref 3.4–10.8)

## 2023-02-27 ENCOUNTER — OFFICE VISIT (OUTPATIENT)
Dept: FAMILY MEDICINE CLINIC | Facility: CLINIC | Age: 68
End: 2023-02-27
Payer: MEDICARE

## 2023-02-27 VITALS
SYSTOLIC BLOOD PRESSURE: 130 MMHG | OXYGEN SATURATION: 96 % | TEMPERATURE: 98.4 F | HEART RATE: 77 BPM | HEIGHT: 72 IN | WEIGHT: 276 LBS | BODY MASS INDEX: 37.38 KG/M2 | DIASTOLIC BLOOD PRESSURE: 78 MMHG

## 2023-02-27 DIAGNOSIS — I10 PRIMARY HYPERTENSION: ICD-10-CM

## 2023-02-27 DIAGNOSIS — I48.0 PAROXYSMAL ATRIAL FIBRILLATION: ICD-10-CM

## 2023-02-27 DIAGNOSIS — R42 DISEQUILIBRIUM: Primary | ICD-10-CM

## 2023-02-27 DIAGNOSIS — R73.03 PREDIABETES: ICD-10-CM

## 2023-02-27 PROCEDURE — 99213 OFFICE O/P EST LOW 20 MIN: CPT | Performed by: INTERNAL MEDICINE

## 2023-02-27 RX ORDER — METHYLPHENIDATE HYDROCHLORIDE 20 MG/1
TABLET ORAL
COMMUNITY
Start: 2023-02-17

## 2023-02-27 NOTE — PROGRESS NOTES
Subjective   Jose Ramon Doll is a 67 y.o. male. Patient is here today for   Chief Complaint   Patient presents with   • Hypertension     A-fib, fatigue, balance isses          Vitals:    02/27/23 1515   BP: 130/78   Pulse: 77   Temp: 98.4 °F (36.9 °C)   SpO2: 96%     Body mass index is 37.43 kg/m².      Past Medical History:   Diagnosis Date   • Abnormal EKG     IN PAST   • Allergic    • Arthritis    • ED (erectile dysfunction)    • First degree AV block    • GERD (gastroesophageal reflux disease)    • Glaucoma    • Hypertension     IN PAST  NO MEDS   • Kidney stone    • Narcolepsy    • Paroxysmal atrial fibrillation (HCC)    • PONV (postoperative nausea and vomiting)     1969 AND 1998   • PVCs (premature ventricular contractions)    • Sleep apnea    • Urinary tract infection       Allergies   Allergen Reactions   • Clarithromycin Other (See Comments)     Unable to eat    • Sulfa Antibiotics Myalgia      Social History     Socioeconomic History   • Marital status:    Tobacco Use   • Smoking status: Never   • Smokeless tobacco: Former     Types: Chew     Quit date: 2020   • Tobacco comments:     Caffeine use: daily   Vaping Use   • Vaping Use: Never used   Substance and Sexual Activity   • Alcohol use: Yes     Alcohol/week: 0.0 - 2.0 standard drinks   • Drug use: No   • Sexual activity: Yes     Partners: Female     Birth control/protection: Hysterectomy        Current Outpatient Medications:   •  azelastine (ASTELIN) 0.1 % nasal spray, 2 sprays into the nostril(s) as directed by provider 2 (Two) Times a Day. Use in each nostril as directed, Disp: 90 mL, Rfl: 3  •  brimonidine-timolol (COMBIGAN) 0.2-0.5 % ophthalmic solution, 1 drop Every 12 (Twelve) Hours., Disp: , Rfl:   •  fluticasone (FLONASE) 50 MCG/ACT nasal spray, 2 sprays into the nostril(s) as directed by provider Daily., Disp: 18.2 mL, Rfl: 3  •  methylphenidate (RITALIN) 20 MG tablet, , Disp: , Rfl:   •  metoprolol succinate XL (TOPROL-XL) 25  MG 24 hr tablet, Take 1 tablet by mouth 2 (Two) Times a Day., Disp: 180 tablet, Rfl: 3  •  rivaroxaban (Xarelto) 20 MG tablet, Take 1 tablet by mouth Daily With Dinner., Disp: 90 tablet, Rfl: 0  •  rosuvastatin (CRESTOR) 20 MG tablet, Take 1 tablet by mouth every night at bedtime for 90 days., Disp: 90 tablet, Rfl: 3  •  timolol (TIMOPTIC) 0.5 % ophthalmic solution, Apply  to eye(s) as directed by provider Every 12 (Twelve) Hours., Disp: , Rfl:   •  valsartan (DIOVAN) 160 MG tablet, Take 1 tablet by mouth 2 (Two) Times a Day., Disp: 180 tablet, Rfl: 2  •  amphetamine-dextroamphetamine (ADDERALL) 20 MG tablet, Take 20 mg by mouth Daily. Take 2 tablets by mouth every morning and 1 tablet by mouth at noon, Disp: , Rfl:      Objective     History of Present Illness  He notes that he has had fairly unsteady gait.  He is an [employer] .  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    He denies dizziness or vertigo per se.      Hypertension         Review of Systems   Constitutional: Negative.    HENT: Negative.    Respiratory: Negative.    Cardiovascular: Negative.    Psychiatric/Behavioral: Negative.        Physical Exam  Vitals and nursing note reviewed.   Constitutional:       Appearance: Normal appearance.      Comments: Pleasant, neatly groomed, no distress.   Cardiovascular:      Rate and Rhythm: Regular rhythm.      Heart sounds: Normal heart sounds. No murmur heard.    No gallop.   Pulmonary:      Effort: No respiratory distress.      Breath sounds: Normal breath sounds. No wheezing or rales.   Neurological:      Mental Status: He is alert and oriented to person, place, and time.   Psychiatric:         Mood and Affect: Mood normal.         Behavior: Behavior normal.         Thought Content: Thought content normal.           Problems Addressed this Visit        Cardiac and Vasculature    Hypertension    Paroxysmal atrial  fibrillation (HCC)       Endocrine and Metabolic    Prediabetes   Other Visit Diagnoses     Disequilibrium    -  Primary    Relevant Orders    Ambulatory Referral to Physical Therapy Evaluate and treat      Diagnoses       Codes Comments    Disequilibrium    -  Primary ICD-10-CM: R42  ICD-9-CM: 780.4     Primary hypertension     ICD-10-CM: I10  ICD-9-CM: 401.9     Paroxysmal atrial fibrillation (HCC)     ICD-10-CM: I48.0  ICD-9-CM: 427.31     Prediabetes     ICD-10-CM: R73.03  ICD-9-CM: 790.29             PLAN  I referred him to physical therapy to see if they can help with his gait.    Hypertension with good control.    Hypercholesterolemia with good control.    I asked him to follow-up for an annual physical exam in 3 to 4 months.  Fasting labs prior that visit should include: PSA but had not been done in a year, CBC, comprehensive metabolic panel, lipid profile, will be good to screen him for vitamin D deficiency.  And hemoglobin A1c.  No follow-ups on file.

## 2023-04-13 RX ORDER — FLUTICASONE PROPIONATE 50 MCG
2 SPRAY, SUSPENSION (ML) NASAL DAILY
Qty: 18.2 ML | Refills: 3 | Status: SHIPPED | OUTPATIENT
Start: 2023-04-13

## 2023-04-13 RX ORDER — AZELASTINE 1 MG/ML
2 SPRAY, METERED NASAL 2 TIMES DAILY
Qty: 90 ML | Refills: 3 | Status: SHIPPED | OUTPATIENT
Start: 2023-04-13

## 2023-04-27 ENCOUNTER — HOSPITAL ENCOUNTER (OUTPATIENT)
Dept: PHYSICAL THERAPY | Facility: HOSPITAL | Age: 68
Setting detail: THERAPIES SERIES
Discharge: HOME | End: 2023-04-27
Payer: MEDICARE

## 2023-04-27 DIAGNOSIS — Z74.09 IMPAIRED FUNCTIONAL MOBILITY, BALANCE, GAIT, AND ENDURANCE: Primary | ICD-10-CM

## 2023-04-27 DIAGNOSIS — I63.9 CEREBROVASCULAR ACCIDENT (CVA), UNSPECIFIED MECHANISM: ICD-10-CM

## 2023-04-27 PROCEDURE — 97162 PT EVAL MOD COMPLEX 30 MIN: CPT | Performed by: PHYSICAL THERAPIST

## 2023-04-27 NOTE — THERAPY EVALUATION
.Outpatient Physical Therapy Neuro Initial Evaluation  [facility]     Patient Name: Jose Ramon Doll  : 1955  MRN: [id number]  Today's Date: 2023      Visit Date: 2023    Patient Active Problem List   Diagnosis   • Allergic rhinitis   • Cervical spinal stenosis   • ED (erectile dysfunction) of non-organic origin   • Hypertension   • Lumbar radiculopathy   • Neoplasm of skin   • Obstructive sleep apnea   • Burning or prickling sensation   • Neck pain   • Benign non-nodular prostatic hyperplasia with lower urinary tract symptoms   • Shoulder pain, acute   • Urinary frequency   • Chronic pain of right knee   • Rotator cuff tendonitis   • Acute medial meniscus tear of right knee   • Prediabetes   • Class 2 severe obesity due to excess calories with serious comorbidity in adult   • Other fatigue   • Paroxysmal atrial fibrillation   • PVC's (premature ventricular contractions)   • H/O TIA (transient ischemic attack) and stroke   • CVA (cerebral vascular accident)   • Excessive daytime sleepiness   • Impacted cerumen of both ears   • Narcolepsy without cataplexy   • Acute medial meniscus tear of right knee   • First degree AV block   • Medicare annual wellness visit, subsequent        Past Medical History:   Diagnosis Date   • Abnormal EKG     IN PAST   • Allergic    • Arthritis    • ED (erectile dysfunction)    • First degree AV block    • GERD (gastroesophageal reflux disease)    • Glaucoma    • Hypertension     IN PAST  NO MEDS   • Kidney stone    • Narcolepsy    • Paroxysmal atrial fibrillation    • PONV (postoperative nausea and vomiting)      AND    • PVCs (premature ventricular contractions)    • Sleep apnea    • Urinary tract infection         Past Surgical History:   Procedure Laterality Date   • ANAL FISTULOTOMY     • APPENDECTOMY     • BACK SURGERY      CERVICAL   • CATARACT EXTRACTION Bilateral    • COLONOSCOPY     • KNEE ARTHROSCOPY Right 2019    Procedure: Knee  Arthroscopy with PARTICIAL MEDIAL Menisectomy;  Surgeon: Dwaine Connolly MD;  Location: [facility];  Service: Orthopedics   • NECK SURGERY     • WISDOM TOOTH EXTRACTION           Visit Dx:     ICD-10-CM ICD-9-CM   1. Impaired functional mobility, balance, gait, and endurance  Z74.09 V49.89   2. Cerebrovascular accident (CVA), unspecified mechanism  I63.9 434.91        Patient History     Row Name 04/27/23 0845             History    Chief Complaint Balance Problems;Difficulty Walking;Difficulty with daily activities;Falls/history of falls  -JK      Brief Description of Current Complaint Reports 30-40 near falls in past 6 months, with reports of hitting doorways frequently and feeling like he's loosing his balance while doing dual task activities (walking while reading hymnal and singing during [employer] service)  -JK      Previous treatment for THIS PROBLEM --  PT  -JK      Patient/Caregiver Goals Improve mobility  wakl better  -JK      Patient/Caregiver Goals Comment Goal is to walk better and have better balance  -JK      Hand Dominance right-handed  -JK      Occupation/sports/leisure activities   -JK         Fall Risk Assessment    Any falls in the past year: Yes  -JK      Number of falls reported in the last 12 months 3 with 30-40 near misses in past 6 months  -JK      Factors that contributed to the fall: Lost balance;Uneven surface  -JK         Services    Prior Rehab/Home Health Experiences Yes  -JK      When was the prior experience with Rehab/Home Health PT for torn meniscus and CVA previously  -JK      Where was the prior experience with Rehab/Home Health [facility]  -JK      Are you currently receiving Home Health services No  -JK      Do you plan to receive Home Health services in the near future No  -JK         Daily Activities    Primary Language English  -JK      Are you able to read Yes  -JK      Are you able to write Yes  -JK         Safety    Are you being hurt, hit, or frightened by anyone at  "home or in your life? No  -JK      Are you being neglected by a caregiver No  -JK      Have you had any of the following issues with N/A  -JK            User Key  (r) = Recorded By, (t) = Taken By, (c) = Cosigned By    Initials Name Provider Type    Malorie Kerr, PT Physical Therapist                     PT Neuro     Row Name 04/27/23 7408             Subjective Comments    Subjective Comments \"I feel like I'm about to lose my balance and fall a lot. Cutting the grass is bad. I hit the doorway a lot.\" Reports having CVA 2 yr ago causing L side weakness and having meniscus tear R knee and old R ankle ligament injury making the R ankle unsteady at times.  -JK         Precautions and Contraindications    Precautions/Limitations fall precautions  -JK         Subjective Pain    Able to rate subjective pain? yes  -JK      Pre-Treatment Pain Level 0  -JK      Post-Treatment Pain Level 0  -JK         Home Living    Current Living Arrangements home  -JK      Home Accessibility stairs to enter home;stairs within home  -JK      Number of Stairs, Main Entrance two  -JK      Stair Railings, Main Entrance railings on both sides of stairs  -JK      Stairs, Within Home, Primary 14 to main bedroom and to the basement  -JK      Stair Railings, Within Home, Primary railings on both sides of stairs  to main bedroom; one rail to the basement  -JK      Home Equipment Cane  -JK         Vision-Basic Assessment    Current Vision Wears glasses only for reading  -JK      Visual History Cataracts;Corrective eye surgery  -JK         Cognition    Overall Cognitive Status WFL  -JK         Sensation    Light Touch No apparent deficits  -JK         General ROM    GENERAL ROM COMMENTS WFL B LEs  -JK         MMT (Manual Muscle Testing)    Rt Lower Ext Rt Hip Flexion;Rt Knee WFL;Rt Ankle Plantarflexion;Rt Ankle Dorsiflexion  -JK      Lt Lower Ext Lt Hip Flexion;Lt Knee WFL;Lt Ankle Plantarflexion;Lt Ankle Dorsiflexion  -JK         MMT Right " Lower Ext    Rt Hip Flexion MMT, Gross Movement (5/5) normal  -JK      Rt Ankle Plantarflexion MMT, Gross Movement (5/5) normal  -JK      Rt Ankle Dorsiflexion MMT, Gross Movement (5/5) normal  -JK         MMT Left Lower Ext    Lt Hip Flexion MMT, Gross Movement (5/5) normal  -JK      Lt Ankle Plantarflexion MMT, Gross Movement (5/5) normal  -JK      Lt Ankle Dorsiflexion MMT, Gross Movement (5/5) normal  -JK         Transfers    Sit-Stand [place] (Transfers) independent  -JK      Stand-Sit [place] (Transfers) independent  -JK         Gait/Stairs (Locomotion)    [place] Level (Gait) independent  -JK      Assistive Device (Gait) --  no AD  -JK      Distance in Feet (Gait) 125' x 2  -JK      Pattern (Gait) step-through  -JK      Right Sided Gait Deviations --  ankle inversion  -JK      [place] Level (Stairs) independent  -JK      Handrail Location (Stairs) both sides  -JK      Number of Steps (Stairs) 4  -JK      Ascending Technique (Stairs) step-over-step  -JK      Comment, (Gait/Stairs) can do steps without holding to rails  -JK         Balance Skills Training    Balance Comments see DIANE And DGI  -JK            User Key  (r) = Recorded By, (t) = Taken By, (c) = Cosigned By    Initials Name Provider Type    Malorie Kerr, PT Physical Therapist                        Therapy Education  Education Details: reviewed POC. Gave pt HEP of SLS, tandem stance, slow MIP  Given: HEP, Symptoms/condition management, Posture/body mechanics, Fall prevention and home safety  Program: New  How Provided: Verbal, Written  Provided to: Patient  Level of Understanding: Teach back education performed, Verbalized, Demonstrated     PT OP Goals     Row Name 04/27/23 0845          PT Short Term Goals    STG Date to Achieve 05/25/23  -JK     STG 1 Pt to be independent with basic HEP of balance activities.  -JK     STG 2 Pt to walk with head turns up/down and side to side with less lateral lean to the L.  -JK     STG 3  "Pt to perform alternate tapping on 6\" step safely x 10 reps each LE with no UE support and no LOB.  -JK     STG 4 Pt to complete balance tasks on compliant surfaces to improve gait and balance outside on grass.  -JK        Long Term Goals    LTG 1 Pt to be independent with advanced HEP of balance activities.  -JK     LTG 2 Pt to improve balance with improved DGI score of 21/24 or better.  -JK     LTG 3 Pt to improve DIANE score to 52/56.  -JK     LTG 4 Pt to walk outside on his grass doing outdoor tasks with less reports of LOB or near LOB  -JK        Time Calculation    PT Goal Re-Cert Due Date 05/25/23  -JK           User Key  (r) = Recorded By, (t) = Taken By, (c) = Cosigned By    Initials Name Provider Type    Malorie Kerr, PT Physical Therapist                 PT Assessment/Plan     Row Name 04/27/23 1148          PT Assessment    Functional Limitations Impaired gait;Limitations in community activities;Limitation in home management;Performance in work activities;Performance in leisure activities  -J     Impairments Balance;Gait  -J     Assessment Comments Pt is a 67 yo WM who presents to PT with reports of gait and balance problems. Pt reports he has had 3 falls in past 12 months, but 30-40 near misses in past 6 months. 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 Pt has PMHx of CVA with L side weakness 2.5 yr ago, R meniscus tear, R ankle lateral ligament tear, a-fib, HTN.  Pt scored 49/56 on DIANE and 17/24 on DGI, with pt having more difficulty with DGI tasks. Pt has inversion of R ankle during gait and lateral sway to the R while performing dual tasks during gait. Pt would like to have better gait and balance with less feeling like he is about to fall. Pt could benefit from skilled PT to address these deficits.  -JK     Please refer to paper survey for " "additional self-reported information Yes  -JK     Rehab Potential Good  -JK     Patient/caregiver participated in establishment of treatment plan and goals Yes  -JK     Patient would benefit from skilled therapy intervention Yes  -JK        PT Plan    PT Frequency 2x/week  -JK     Predicted Duration of Therapy Intervention (PT) 8 weeks  -JK     Planned CPT's? PT EVAL MOD COMPLELITY: 69371;PT THER PROC EA 15 MIN: 54301;PT THER ACT EA 15 MIN: 18785;PT NEUROMUSC RE-EDUCATION EA 15 MIN: 62977;PT GAIT TRAINING EA 15 MIN: 98770  -JK     Physical Therapy Interventions (Optional Details) balance training;gait training;home exercise program;neuromuscular re-education;stair training;strengthening  -JK           User Key  (r) = Recorded By, (t) = Taken By, (c) = Cosigned By    Initials Name Provider Type    Malorie Kerr, TATUM Physical Therapist                    OP Exercises     Row Name 04/27/23 0845             Subjective Comments    Subjective Comments \"I feel like I'm about to lose my balance and fall a lot. Cutting the grass is bad. I hit the doorway a lot.\" Reports having CVA 2 yr ago causing L side weakness and having meniscus tear R knee and old R ankle ligament injury making the R ankle unsteady at times.  -JK         Subjective Pain    Able to rate subjective pain? yes  -JK      Pre-Treatment Pain Level 0  -JK      Post-Treatment Pain Level 0  -JK            User Key  (r) = Recorded By, (t) = Taken By, (c) = Cosigned By    Initials Name Provider Type    Malorie Kerr PT Physical Therapist                            Outcome Measure Options: Salcido Balance, Dynamic Gait Index  Salcido Balance Scale  Sitting to Standing: able to stand without using hands and stabilize independently  Standing Unsupported: able to stand safely for 2 minutes  Sitting with Back Unsupported but Feet Supported on Floor or on Stool: able to sit safely and securely for 2 minutes  Standing to Sitting: sits safely with minimal use of " hands  Transfers: able to transfer safely with minor use of hands  Standing Unsupported with Eyes Closed: able to stand 10 seconds safely  Standing Unsupported with Feet Together: able to place feet together independently and stand 1 minute safely  Reaching Forward with Outstretched Arm While Standing: can reach forward confidently 25 cm (10 inches)   Object From the Floor From a Standing Position: able to  object safely and easily  Turning to Look Behind Over Left and Right Shoulders While Standing: looks behind from both sides and weight shifts well  Turn 360 Degrees: able to turn 360 degrees safely one side only 4 seconds or less  Place Alternate Foot on Step or Stool While Standing Unsupported: able to complete 4 steps without aid with supervision  Standing Unsupported with One Foot in Front: able to take small step independently and hold 30 seconds  Standing on One Leg: able to lift leg independently and hold >/equal to 3 seconds  Salcido Total Score: 49  Dynamic Gait Index (DGI)  Gait Level Surface: Normal: walks 20’, no assistive devices, good speed, no evidence for imbalance, normal gait pattern  Change in Gait Speed: Normal: Able to smoothly change walking speed without loss of balance or gait deviation. Shows significant difference in walking speeds between normal, fast and slow paces.  Gait with Horizontal Head Turns: Moderate impairment: Performs head turns with moderate change in gait velocity, slows down, staggers, but recovers, can continue to walk.  Gait with Vertical Head Turns: Mild impairment: Performs head turns smoothly with slight change in gait velocity, i.e. minor disruption to smooth gait path or uses walking aid.  Gait and Pivot Turn: Moderate impairment: Turns slowly, requires verbal cueing, requires several small steps to catch balance following turn and stop.  Step Over Obstacle: Mild impairment: Is able to step over shoe box, but must slow down and adjust steps to clear box  safely.  Step Around Obstacles: Mild impairment: Is able to step around both cones, but must slow down and adjust steps to clear cones.  Steps: Normal: Alternating feet, no rail.  Dynamic Gait Index Score: 17    Time Calculation:   Start Time: 0845  Stop Time: 0930  Time Calculation (min): 45 min  Total Timed Code Minutes- PT: 45 minute(s)  Untimed Charges  PT Eval/Re-eval Minutes: 45  Total Minutes  Untimed Charges Total Minutes: 45   Total Minutes: 45   Therapy Charges for Today     Code Description Service Date Service Provider Modifiers Qty    [id number] HC PT EVAL MOD COMPLEXITY 4 4/27/2023 Malorie Seay, PT GP 1          PT G-Codes  Outcome Measure Options: [place] Balance, Dynamic Gait Index  Salcido Total Score: 49         Malorie Seay, PT  4/27/2023

## 2023-05-02 ENCOUNTER — HOSPITAL ENCOUNTER (OUTPATIENT)
Dept: PHYSICAL THERAPY | Facility: HOSPITAL | Age: 68
Setting detail: THERAPIES SERIES
Discharge: HOME | End: 2023-05-02
Payer: MEDICARE

## 2023-05-02 DIAGNOSIS — I63.9 CEREBROVASCULAR ACCIDENT (CVA), UNSPECIFIED MECHANISM: ICD-10-CM

## 2023-05-02 DIAGNOSIS — Z74.09 IMPAIRED FUNCTIONAL MOBILITY, BALANCE, GAIT, AND ENDURANCE: Primary | ICD-10-CM

## 2023-05-02 PROCEDURE — 97110 THERAPEUTIC EXERCISES: CPT | Performed by: PHYSICAL THERAPIST

## 2023-05-02 PROCEDURE — 97112 NEUROMUSCULAR REEDUCATION: CPT | Performed by: PHYSICAL THERAPIST

## 2023-05-02 NOTE — THERAPY TREATMENT NOTE
"    Outpatient Physical Therapy Neuro Treatment Note  [facility]     Patient Name: Jose Ramon Doll  : 1955  MRN: [id number]  Today's Date: 2023      Visit Date: 2023    Visit Dx:    ICD-10-CM ICD-9-CM   1. Impaired functional mobility, balance, gait, and endurance  Z74.09 V49.89   2. Cerebrovascular accident (CVA), unspecified mechanism  I63.9 434.91       Patient Active Problem List   Diagnosis   • Allergic rhinitis   • Cervical spinal stenosis   • ED (erectile dysfunction) of non-organic origin   • Hypertension   • Lumbar radiculopathy   • Neoplasm of skin   • Obstructive sleep apnea   • Burning or prickling sensation   • Neck pain   • Benign non-nodular prostatic hyperplasia with lower urinary tract symptoms   • Shoulder pain, acute   • Urinary frequency   • Chronic pain of right knee   • Rotator cuff tendonitis   • Acute medial meniscus tear of right knee   • Prediabetes   • Class 2 severe obesity due to excess calories with serious comorbidity in adult   • Other fatigue   • Paroxysmal atrial fibrillation   • PVC's (premature ventricular contractions)   • H/O TIA (transient ischemic attack) and stroke   • CVA (cerebral vascular accident)   • Excessive daytime sleepiness   • Impacted cerumen of both ears   • Narcolepsy without cataplexy   • Acute medial meniscus tear of right knee   • First degree AV block   • Medicare annual wellness visit, subsequent            PT Neuro     Row Name 23 1440             Subjective Comments    Subjective Comments \"I am tired today\"  -JK         Precautions and Contraindications    Precautions/Limitations fall precautions  -JK         Subjective Pain    Able to rate subjective pain? yes  -JK      Pre-Treatment Pain Level 0  -JK      Post-Treatment Pain Level 0  -JK         Cognition    Overall Cognitive Status WFL  -JK         Posture/Observations    Posture/Observations Comments Pt arrived to PT without AD. Pt has L shoulder droop as compared to " the R  -JK         Transfers    Sit-Stand [place] (Transfers) independent  -JK      Stand-Sit [place] (Transfers) independent  -JK         Gait/Stairs (Locomotion)    [place] Level (Gait) independent  -JK      Distance in Feet (Gait) 125' x 2  -JK      Deviations/Abnormal Patterns (Gait) base of support, narrow  -JK      Right Sided Gait Deviations --  increased inversion R ankle during stance/WB  -JK         Balance Skills Training    Standing-Level of Assistance Minimum assistance  -JK      Static Standing Balance Support No upper extremity supported  -JK      Standing-Balance Activities Compliant surfaces  -JK      Gait Balance-Level of Assistance Contact guard;Minimum assistance  -JK      Gait Balance Support No upper extremity supported;Right upper extremity supported  -JK      Gait Balance Activities braiding / front;backwards;tandem;side-stepping  -JK            User Key  (r) = Recorded By, (t) = Taken By, (c) = Cosigned By    Initials Name Provider Type    Malorie Kerr PT Physical Therapist                         PT Assessment/Plan     Row Name 05/02/23 1537          PT Assessment    Functional Limitations Impaired gait;Limitations in community activities;Limitation in home management;Performance in work activities;Performance in leisure activities  -JK     Impairments Balance;Gait  -JK     Assessment Comments Pt appeared more fatigued today during PT. Pt had a more narrow JODIE with gait and this became even more evident with dual task challenges. Pt's L shoulder appeared lower than the R today. Pt' has visible R ankle instability during standing tasks, going into inversion easily. Pt given new HEP of standing hip abd, hip flex with knee ext, mini squat.  -JK           User Key  (r) = Recorded By, (t) = Taken By, (c) = Cosigned By    Initials Name Provider Type    Malorie Kerr PT Physical Therapist                    OP Exercises     Row Name 05/02/23 1542 05/02/23 9708           "Subjective Comments    Subjective Comments -- \"I am tired today\"  -JK        Subjective Pain    Able to rate subjective pain? -- yes  -JK     Pre-Treatment Pain Level -- 0  -JK     Post-Treatment Pain Level -- 0  -JK        Total Minutes    46697 - PT Therapeutic Exercise Minutes 15  -JK --     47576 -  PT Neuromuscular Reeducation Minutes 30  -JK --        Exercise 1    Exercise Name 1 -- slow MIP, tandem stance, SLS  -JK     Cueing 1 -- Verbal;Demo  -JK     Sets 1 -- 1  -JK     Reps 1 -- 10  -JK        Exercise 2    Exercise Name 2 -- Standing ex: Hip abd, hip flex with knee ext, mini squat  -JK     Cueing 2 -- Verbal;Demo  -JK     Sets 2 -- 2  -JK     Reps 2 -- 10  -JK     Additional Comments -- cues to keep trunk stable; one UE support when lifting L LE for R ankle stability  -JK        Exercise 3    Exercise Name 3 -- alt tapping 6\" step  -JK     Cueing 3 -- Verbal  -JK     Sets 3 -- 1  -JK     Reps 3 -- 10  -JK     Additional Comments -- R UE support from hi table  -JK        Exercise 4    Exercise Name 4 -- sit to stand from chair with feet on compliant surface  -JK     Cueing 4 -- Verbal  -JK     Sets 4 -- 1  -JK     Reps 4 -- 10  -JK     Additional Comments -- arms on chest  -JK           User Key  (r) = Recorded By, (t) = Taken By, (c) = Cosigned By    Initials Name Provider Type    Malorie Kerr, PT Physical Therapist                                Therapy Education  Education Details: cues for trunk stability during ex  Given: HEP, Symptoms/condition management, Posture/body mechanics, Fall prevention and home safety, Mobility training  Program: New, Reinforced, Progressed  How Provided: Verbal, Demonstration, Written  Provided to: Patient  Level of Understanding: Teach back education performed, Verbalized, Demonstrated              Time Calculation:   Start Time: 1440  Stop Time: 1525  Time Calculation (min): 45 min  Total Timed Code Minutes- PT: 45 minute(s)  Timed Charges  16448 - PT Therapeutic " Exercise Minutes: 15  34322 -  PT Neuromuscular Reeducation Minutes: 30  Total Minutes  Timed Charges Total Minutes: 45   Total Minutes: 45   Therapy Charges for Today     Code Description Service Date Service Provider Modifiers Qty    57722403451  PT NEUROMUSC RE EDUCATION EA 15 MIN 5/2/2023 Malorie Seay, PT GP 2    [id number]  PT THER PROC EA 15 MIN 5/2/2023 Malorie Seay, PT GP 1                    Malorie Seay, PT  5/2/2023

## 2023-05-04 ENCOUNTER — HOSPITAL ENCOUNTER (OUTPATIENT)
Dept: PHYSICAL THERAPY | Facility: HOSPITAL | Age: 68
Setting detail: THERAPIES SERIES
Discharge: HOME | End: 2023-05-04
Payer: MEDICARE

## 2023-05-04 DIAGNOSIS — I63.9 CEREBROVASCULAR ACCIDENT (CVA), UNSPECIFIED MECHANISM: ICD-10-CM

## 2023-05-04 DIAGNOSIS — Z74.09 IMPAIRED FUNCTIONAL MOBILITY, BALANCE, GAIT, AND ENDURANCE: Primary | ICD-10-CM

## 2023-05-04 PROCEDURE — 97112 NEUROMUSCULAR REEDUCATION: CPT | Performed by: PHYSICAL THERAPIST

## 2023-05-04 PROCEDURE — 97110 THERAPEUTIC EXERCISES: CPT | Performed by: PHYSICAL THERAPIST

## 2023-05-04 NOTE — THERAPY TREATMENT NOTE
"    Outpatient Physical Therapy Neuro Treatment Note  [facility]     Patient Name: Jose Ramon Doll  : 1955  MRN: [id number]  Today's Date: 2023      Visit Date: 2023    Visit Dx:    ICD-10-CM ICD-9-CM   1. Impaired functional mobility, balance, gait, and endurance  Z74.09 V49.89   2. Cerebrovascular accident (CVA), unspecified mechanism  I63.9 434.91       Patient Active Problem List   Diagnosis   • Allergic rhinitis   • Cervical spinal stenosis   • ED (erectile dysfunction) of non-organic origin   • Hypertension   • Lumbar radiculopathy   • Neoplasm of skin   • Obstructive sleep apnea   • Burning or prickling sensation   • Neck pain   • Benign non-nodular prostatic hyperplasia with lower urinary tract symptoms   • Shoulder pain, acute   • Urinary frequency   • Chronic pain of right knee   • Rotator cuff tendonitis   • Acute medial meniscus tear of right knee   • Prediabetes   • Class 2 severe obesity due to excess calories with serious comorbidity in adult   • Other fatigue   • Paroxysmal atrial fibrillation   • PVC's (premature ventricular contractions)   • H/O TIA (transient ischemic attack) and stroke   • CVA (cerebral vascular accident)   • Excessive daytime sleepiness   • Impacted cerumen of both ears   • Narcolepsy without cataplexy   • Acute medial meniscus tear of right knee   • First degree AV block   • Medicare annual wellness visit, subsequent            PT Neuro     Row Name 23 0845             Subjective Comments    Subjective Comments \"I worked in the yard for four hours yesterday which is a longer time than I can usually go. By last night I could feel my quads were tired from outside work and what we did on .\"  -JK         Precautions and Contraindications    Precautions/Limitations fall precautions  -JK         Subjective Pain    Able to rate subjective pain? yes  -JK      Pre-Treatment Pain Level 0  -JK      Post-Treatment Pain Level 0  -JK         Cognition    " Overall Cognitive Status WFL  -JK         Posture/Observations    Posture/Observations Comments Pt arrived to PT without AD. Shoulders appear level.  -JK         Transfers    Sit-Stand [place] (Transfers) independent  -JK      Stand-Sit [place] (Transfers) independent  -JK         Gait/Stairs (Locomotion)    [place] Level (Gait) independent  -JK      Distance in Feet (Gait) 60' x 6  -JK      Pattern (Gait) step-through  -JK      Deviations/Abnormal Patterns (Gait) base of support, narrow  -JK      Bilateral Gait Deviations --  ankle inversion B at times R>L  -JK      Left Sided Gait Deviations --  L ankle inversion first several steps  -JK      Right Sided Gait Deviations --  R ankle inversion present during walking  -JK         Balance Skills Training    Standing-Balance Activities Compliant surfaces;Reaching across midline;Reaching for weighted objects  extending arms to reach with 6# weighted ball in hands fwd and diagonally while standing on foam  -JK      Gait Balance-Level of Assistance Contact guard  -JK      Gait Balance Support Right upper extremity supported;Left upper extremity supported  one UE support  -JK      Gait Balance Activities uneven surface  stepping to three different compliant surfaces fwd and back; walking with head turns all directions; walk and toss ball to the R and L  -JK            User Key  (r) = Recorded By, (t) = Taken By, (c) = Cosigned By    Initials Name Provider Type    Malorie Kerr, PT Physical Therapist                         PT Assessment/Plan     Row Name 05/04/23 0951          PT Assessment    Functional Limitations Impaired gait;Limitations in community activities;Limitation in home management;Performance in work activities;Performance in leisure activities  -JK     Impairments Balance;Gait  -JK     Assessment Comments Pt had visisble ankle inversion on the L with the first several steps taken when working on walking and balance dual tasks. Added  "resisted DF and EV B ankles to improve stabilty. Pt working on standing balance tasks to challenge ankles, legs, and core.  -JK           User Key  (r) = Recorded By, (t) = Taken By, (c) = Cosigned By    Initials Name Provider Type    Malorie Kerr, TATUM Physical Therapist                    OP Exercises     Row Name 05/04/23 1000 05/04/23 0845          Subjective Comments    Subjective Comments -- \"I worked in the yard for four hours yesterday which is a longer time than I can usually go. By last night I could feel my quads were tired from outside work and what we did on 5/2.\"  -JK        Subjective Pain    Able to rate subjective pain? -- yes  -JK     Pre-Treatment Pain Level -- 0  -JK     Post-Treatment Pain Level -- 0  -JK        Total Minutes    36475 - PT Therapeutic Exercise Minutes 15  -JK --     17429 -  PT Neuromuscular Reeducation Minutes 30  -JK --        Exercise 2    Exercise Name 2 -- Standing ex: Hip abd, hip flex with knee ext, mini squat  -JK     Cueing 2 -- Verbal;Demo  -JK     Sets 2 -- 2  -JK     Reps 2 -- 10  -JK     Additional Comments -- cues to keep core turned on  -JK        Exercise 5    Exercise Name 5 -- ankle DF, EV  -JK     Cueing 5 -- Verbal;Demo  -JK     Sets 5 -- 1  -JK     Reps 5 -- 10  -JK     Additional Comments -- seated with foot on steool; GTB R LE; blue TB L LE  -JK           User Key  (r) = Recorded By, (t) = Taken By, (c) = Cosigned By    Initials Name Provider Type    Malorie Kerr, PT Physical Therapist                                Therapy Education  Education Details: new HEP: ankle EV, DF with TB  Given: HEP, Symptoms/condition management, Posture/body mechanics, Fall prevention and home safety, Mobility training  Program: New, Reinforced, Progressed  How Provided: Verbal, Demonstration, Written  Provided to: Patient  Level of Understanding: Teach back education performed, Verbalized, Demonstrated              Time Calculation:   Start Time: 0845  Stop Time: " 0930  Time Calculation (min): 45 min  Total Timed Code Minutes- PT: 45 minute(s)  Timed Charges  11202 - PT Therapeutic Exercise Minutes: 15  78079 -  PT Neuromuscular Reeducation Minutes: 30  Total Minutes  Timed Charges Total Minutes: 45   Total Minutes: 45   Therapy Charges for Today     Code Description Service Date Service Provider Modifiers Qty    [id number]  PT NEUROMUSC RE EDUCATION EA 15 MIN 5/4/2023 Malorie Seay, PT GP 2    [id number] HC PT THER PROC EA 15 MIN 5/4/2023 Malorie Seay, PT GP 1                    Malorie Seay, PT  5/4/2023

## 2023-05-09 ENCOUNTER — HOSPITAL ENCOUNTER (OUTPATIENT)
Dept: PHYSICAL THERAPY | Facility: HOSPITAL | Age: 68
Setting detail: THERAPIES SERIES
Discharge: HOME | End: 2023-05-09
Payer: MEDICARE

## 2023-05-09 DIAGNOSIS — I63.9 CEREBROVASCULAR ACCIDENT (CVA), UNSPECIFIED MECHANISM: ICD-10-CM

## 2023-05-09 DIAGNOSIS — Z74.09 IMPAIRED FUNCTIONAL MOBILITY, BALANCE, GAIT, AND ENDURANCE: Primary | ICD-10-CM

## 2023-05-09 PROCEDURE — 97112 NEUROMUSCULAR REEDUCATION: CPT | Performed by: PHYSICAL THERAPIST

## 2023-05-09 PROCEDURE — 97110 THERAPEUTIC EXERCISES: CPT | Performed by: PHYSICAL THERAPIST

## 2023-05-09 NOTE — THERAPY TREATMENT NOTE
"    Outpatient Physical Therapy Neuro Treatment Note  [facility]     Patient Name: Jose Ramon Doll  : 1955  MRN: [id number]  Today's Date: 2023      Visit Date: 2023    Visit Dx:    ICD-10-CM ICD-9-CM   1. Impaired functional mobility, balance, gait, and endurance  Z74.09 V49.89   2. Cerebrovascular accident (CVA), unspecified mechanism  I63.9 434.91       Patient Active Problem List   Diagnosis   • Allergic rhinitis   • Cervical spinal stenosis   • ED (erectile dysfunction) of non-organic origin   • Hypertension   • Lumbar radiculopathy   • Neoplasm of skin   • Obstructive sleep apnea   • Burning or prickling sensation   • Neck pain   • Benign non-nodular prostatic hyperplasia with lower urinary tract symptoms   • Shoulder pain, acute   • Urinary frequency   • Chronic pain of right knee   • Rotator cuff tendonitis   • Acute medial meniscus tear of right knee   • Prediabetes   • Class 2 severe obesity due to excess calories with serious comorbidity in adult   • Other fatigue   • Paroxysmal atrial fibrillation   • PVC's (premature ventricular contractions)   • H/O TIA (transient ischemic attack) and stroke   • CVA (cerebral vascular accident)   • Excessive daytime sleepiness   • Impacted cerumen of both ears   • Narcolepsy without cataplexy   • Acute medial meniscus tear of right knee   • First degree AV block   • Medicare annual wellness visit, subsequent            PT Neuro     Row Name 23 0845             Subjective Comments    Subjective Comments \"I had a list to the L today and I think it was caused by my L hip being weak\"  -JK         Precautions and Contraindications    Precautions/Limitations fall precautions  -JK         Subjective Pain    Able to rate subjective pain? yes  -JK      Pre-Treatment Pain Level 0  -JK      Post-Treatment Pain Level 0  -JK         Cognition    Overall Cognitive Status WFL  -JK         Posture/Observations    Posture/Observations Comments Pt " arrived to PT without AD. Shoulders appear level. Pt has new orthotics in shoes  -JK         Transfers    Sit-Stand [place] (Transfers) independent  -JK      Stand-Sit [place] (Transfers) independent  -JK         Gait/Stairs (Locomotion)    [place] Level (Gait) independent  -JK      Distance in Feet (Gait) 125' x 2  -JK      Pattern (Gait) step-through  -JK      Deviations/Abnormal Patterns (Gait) base of support, narrow  -JK      Gait Assessment/Intervention less inversion observed in B ankles at beginning of PT today  -JK         Balance Skills Training    Standing-Level of Assistance Minimum assistance;Contact guard  -JK      Static Standing Balance Support No upper extremity supported  -JK      Standing-Balance Activities Balloon tapping;Compliant surfaces;Feet together  head turns with feet together on compliant surface; tapping balloon standing on air discs  -JK      Gait Balance-Level of Assistance Contact guard  -JK      Gait Balance Support No upper extremity supported;reggie bar  -JK      Gait Balance Activities stepping over object;hurdles  altrenate fwd steps over hurdles; step to side over hurdles R/L  -JK            User Key  (r) = Recorded By, (t) = Taken By, (c) = Cosigned By    Initials Name Provider Type    Malorie Kerr, PT Physical Therapist                         PT Assessment/Plan     Row Name 05/09/23 0958          PT Assessment    Functional Limitations Impaired gait;Limitations in community activities;Limitation in home management;Performance in work activities;Performance in leisure activities  -JK     Impairments Balance;Gait  -JK     Assessment Comments Pt had less observable ankle inversion B ankles at the beginning of PT today. Pt tolerates good ankle, hip, core challenges during PT with very few rest breaks.  -JK           User Key  (r) = Recorded By, (t) = Taken By, (c) = Cosigned By    Initials Name Provider Type    Malorie Kerr, PT Physical Therapist        "             OP Exercises     Row Name 05/09/23 0949 05/09/23 0845          Subjective Comments    Subjective Comments -- \"I had a list to the L today and I think it was caused by my L hip being weak\"  -JK        Subjective Pain    Able to rate subjective pain? -- yes  -JK     Pre-Treatment Pain Level -- 0  -JK     Post-Treatment Pain Level -- 0  -JK        Total Minutes    13069 - PT Therapeutic Exercise Minutes 15  -JK --     61645 -  PT Neuromuscular Reeducation Minutes 30  -JK --        Exercise 2    Exercise Name 2 -- Standing ex: Hip abd, hip flex with knee ext, mini squat  -JK     Cueing 2 -- Verbal;Demo  -JK     Sets 2 -- 3  -JK     Reps 2 -- 10  -JK     Additional Comments -- no UE support; cues for posture  -JK        Exercise 4    Exercise Name 4 -- sit to stand from chair with feet on compliant surface  -JK     Cueing 4 -- Verbal  -JK     Sets 4 -- 1  -JK     Reps 4 -- 10  -JK     Additional Comments -- arms in front of him  -JK        Exercise 6    Exercise Name 6 -- Heel cord stretch on step ( B heels;s irma heel)  -JK     Cueing 6 -- Verbal;Demo  -JK     Reps 6 -- 3x  -JK     Time 6 -- 15 sec hold  -JK        Exercise 7    Exercise Name 7 -- side step with medium resistance band above knees  -JK     Cueing 7 -- Verbal;Demo  -JK     Additional Comments -- 15' x 2, 7' x 3  each direction  -JK           User Key  (r) = Recorded By, (t) = Taken By, (c) = Cosigned By    Initials Name Provider Type    Malorie Kerr, PT Physical Therapist                                Therapy Education  Education Details: posture  Given: HEP, Symptoms/condition management, Posture/body mechanics, Fall prevention and home safety, Mobility training  Program: New, Reinforced, Progressed  How Provided: Verbal, Demonstration, Written  Provided to: Patient  Level of Understanding: Teach back education performed, Verbalized, Demonstrated              Time Calculation:   Start Time: 0845  Stop Time: 0930  Time Calculation " (min): 45 min  Total Timed Code Minutes- PT: 45 minute(s)  Timed Charges  19930 - PT Therapeutic Exercise Minutes: 15  47943 -  PT Neuromuscular Reeducation Minutes: 30  Total Minutes  Timed Charges Total Minutes: 45   Total Minutes: 45   Therapy Charges for Today     Code Description Service Date Service Provider Modifiers Qty    39927528539 HC PT NEUROMUSC RE EDUCATION EA 15 MIN 5/9/2023 Malorie Seay, PT GP 2    [id number] HC PT THER PROC EA 15 MIN 5/9/2023 Malorie Seay, PT GP 1                    Malorie Seay, PT  5/9/2023

## 2023-05-11 ENCOUNTER — APPOINTMENT (OUTPATIENT)
Dept: PHYSICAL THERAPY | Facility: HOSPITAL | Age: 68
End: 2023-05-11
Payer: MEDICARE

## 2023-05-16 ENCOUNTER — HOSPITAL ENCOUNTER (OUTPATIENT)
Dept: PHYSICAL THERAPY | Facility: HOSPITAL | Age: 68
Setting detail: THERAPIES SERIES
Discharge: HOME | End: 2023-05-16
Payer: MEDICARE

## 2023-05-16 DIAGNOSIS — I63.9 CEREBROVASCULAR ACCIDENT (CVA), UNSPECIFIED MECHANISM: ICD-10-CM

## 2023-05-16 DIAGNOSIS — Z74.09 IMPAIRED FUNCTIONAL MOBILITY, BALANCE, GAIT, AND ENDURANCE: Primary | ICD-10-CM

## 2023-05-16 PROCEDURE — 97110 THERAPEUTIC EXERCISES: CPT | Performed by: PHYSICAL THERAPIST

## 2023-05-16 PROCEDURE — 97112 NEUROMUSCULAR REEDUCATION: CPT | Performed by: PHYSICAL THERAPIST

## 2023-05-16 NOTE — THERAPY TREATMENT NOTE
"    Outpatient Physical Therapy Neuro Treatment Note  [facility]     Patient Name: Jose Ramon Doll  : 1955  MRN: [id number]  Today's Date: 2023      Visit Date: 2023    Visit Dx:    ICD-10-CM ICD-9-CM   1. Impaired functional mobility, balance, gait, and endurance  Z74.09 V49.89   2. Cerebrovascular accident (CVA), unspecified mechanism  I63.9 434.91       Patient Active Problem List   Diagnosis   • Allergic rhinitis   • Cervical spinal stenosis   • ED (erectile dysfunction) of non-organic origin   • Hypertension   • Lumbar radiculopathy   • Neoplasm of skin   • Obstructive sleep apnea   • Burning or prickling sensation   • Neck pain   • Benign non-nodular prostatic hyperplasia with lower urinary tract symptoms   • Shoulder pain, acute   • Urinary frequency   • Chronic pain of right knee   • Rotator cuff tendonitis   • Acute medial meniscus tear of right knee   • Prediabetes   • Class 2 severe obesity due to excess calories with serious comorbidity in adult   • Other fatigue   • Paroxysmal atrial fibrillation   • PVC's (premature ventricular contractions)   • H/O TIA (transient ischemic attack) and stroke   • CVA (cerebral vascular accident)   • Excessive daytime sleepiness   • Impacted cerumen of both ears   • Narcolepsy without cataplexy   • Acute medial meniscus tear of right knee   • First degree AV block   • Medicare annual wellness visit, subsequent            PT Neuro     Row Name 23 0845             Subjective Comments    Subjective Comments \"I worked out last week hard one day and I was very fatigued the next day. I'm also trying to determine when to take a break due to having a-fib. I'm going to talk to the cardiologist about how hard to push myself\"  -JK         Precautions and Contraindications    Precautions/Limitations fall precautions  -JK         Subjective Pain    Able to rate subjective pain? yes  -JK      Pre-Treatment Pain Level 0  -JK      Post-Treatment Pain " Level 0  -JK         Cognition    Overall Cognitive Status WFL  -JK         Posture/Observations    Posture/Observations Comments Pt arrived to PT without AD. Shoulders appear level. Pt has new orthotics in shoes  -JK         Transfers    Sit-Stand [place] (Transfers) independent  -JK      Stand-Sit [place] (Transfers) independent  -JK         Gait/Stairs (Locomotion)    [place] Level (Gait) independent  -JK      Distance in Feet (Gait) 80' x 2  -JK      Pattern (Gait) step-through  -JK      Deviations/Abnormal Patterns (Gait) base of support, narrow  -JK      Gait Assessment/Intervention less inversion B ankles observed today with gait  -JK         Balance Skills Training    Gait Balance-Level of Assistance Contact guard  -JK      Gait Balance Support No upper extremity supported;reggie bar  -JK      Gait Balance Activities side-stepping  with green and then with blue TB above knees; also side step B with small squat  -JK            User Key  (r) = Recorded By, (t) = Taken By, (c) = Cosigned By    Initials Name Provider Type    Malorie Kerr, TATUM Physical Therapist                         PT Assessment/Plan     Row Name 05/16/23 0939          PT Assessment    Functional Limitations Impaired gait;Limitations in community activities;Limitation in home management;Performance in work activities;Performance in leisure activities  -JK     Impairments Balance;Gait  -JK     Assessment Comments Pt agrees that the hip and LE strengthening exercises are helping him be more steady withgait and functional tasks. Pt is aware that he has cardiac limitations from a-fib and will have to balance pushing himself to get stronger with cardiac restirctions. Pt and PT agree that it is best to go slowly with exercise progression and to stop if he feels symptoms of a-fib.  -JK           User Key  (r) = Recorded By, (t) = Taken By, (c) = Cosigned By    Initials Name Provider Type    Malorie Kerr, PT Physical Therapist  "                   OP Exercises     Row Name 05/16/23 0941 05/16/23 0845          Subjective Comments    Subjective Comments -- \"I worked out last week hard one day and I was very fatigued the next day. I'm also trying to determine when to take a break due to having a-fib. I'm going to talk to the cardiologist about how hard to push myself\"  -JK        Subjective Pain    Able to rate subjective pain? -- yes  -JK     Pre-Treatment Pain Level -- 0  -JK     Post-Treatment Pain Level -- 0  -JK        Total Minutes    08635 - PT Therapeutic Exercise Minutes 35  -JK --     30137 -  PT Neuromuscular Reeducation Minutes 10  -JK --        Exercise 2    Exercise Name 2 -- Standing ex: Hip abd, hip flex with knee ext, mini squat  -JK     Cueing 2 -- Verbal;Demo  -JK     Sets 2 -- 2  -JK     Reps 2 -- 10  -JK     Additional Comments -- one or no UE support if able; GTB around ankles  -JK        Exercise 7    Exercise Name 7 -- side step with medium resistance band above knees  -JK     Cueing 7 -- Verbal;Demo  -JK     Additional Comments -- GTB, BTB; 20' x 2 each  -JK        Exercise 8    Exercise Name 8 -- stand ex: hip ext; HS curls  -JK     Cueing 8 -- Verbal;Tactile;Demo  -JK     Sets 8 -- 1  -JK     Reps 8 -- 10  -JK     Additional Comments -- GTB  -JK           User Key  (r) = Recorded By, (t) = Taken By, (c) = Cosigned By    Initials Name Provider Type    Malorie Kerr, PT Physical Therapist                                Therapy Education  Education Details: new HEP: hip abd, hip ext, hip flex w/knee ext, HS curls, side step; all with resistance band  Given: HEP, Symptoms/condition management, Posture/body mechanics, Fall prevention and home safety, Mobility training  Program: New, Reinforced, Progressed  How Provided: Verbal, Demonstration, Written  Provided to: Patient  Level of Understanding: Teach back education performed, Verbalized, Demonstrated              Time Calculation:   Start Time: 0845  Stop Time: " 0930  Time Calculation (min): 45 min  Total Timed Code Minutes- PT: 45 minute(s)  Timed Charges  71308 - PT Therapeutic Exercise Minutes: 35  95424 -  PT Neuromuscular Reeducation Minutes: 10  Total Minutes  Timed Charges Total Minutes: 45   Total Minutes: 45   Therapy Charges for Today     Code Description Service Date Service Provider Modifiers Qty    82742909083 HC PT NEUROMUSC RE EDUCATION EA 15 MIN 5/16/2023 Malorie Seay, PT GP 1    14614434112 HC PT THER PROC EA 15 MIN 5/16/2023 Malorie Seay, PT GP 2                    Malorie Seay, PT  5/16/2023

## 2023-05-18 ENCOUNTER — HOSPITAL ENCOUNTER (OUTPATIENT)
Dept: PHYSICAL THERAPY | Facility: HOSPITAL | Age: 68
Setting detail: THERAPIES SERIES
Discharge: HOME | End: 2023-05-18
Payer: MEDICARE

## 2023-05-18 DIAGNOSIS — I63.9 CEREBROVASCULAR ACCIDENT (CVA), UNSPECIFIED MECHANISM: ICD-10-CM

## 2023-05-18 DIAGNOSIS — Z74.09 IMPAIRED FUNCTIONAL MOBILITY, BALANCE, GAIT, AND ENDURANCE: Primary | ICD-10-CM

## 2023-05-18 PROCEDURE — 97112 NEUROMUSCULAR REEDUCATION: CPT | Performed by: PHYSICAL THERAPIST

## 2023-05-18 PROCEDURE — 97110 THERAPEUTIC EXERCISES: CPT | Performed by: PHYSICAL THERAPIST

## 2023-05-18 NOTE — THERAPY TREATMENT NOTE
Outpatient Physical Therapy Neuro Treatment Note  [facility]     Patient Name: Jose Ramon Doll  : 1955  MRN: [id number]  Today's Date: 2023      Visit Date: 2023    Visit Dx:    ICD-10-CM ICD-9-CM   1. Impaired functional mobility, balance, gait, and endurance  Z74.09 V49.89   2. Cerebrovascular accident (CVA), unspecified mechanism  I63.9 434.91       Patient Active Problem List   Diagnosis   • Allergic rhinitis   • Cervical spinal stenosis   • ED (erectile dysfunction) of non-organic origin   • Hypertension   • Lumbar radiculopathy   • Neoplasm of skin   • Obstructive sleep apnea   • Burning or prickling sensation   • Neck pain   • Benign non-nodular prostatic hyperplasia with lower urinary tract symptoms   • Shoulder pain, acute   • Urinary frequency   • Chronic pain of right knee   • Rotator cuff tendonitis   • Acute medial meniscus tear of right knee   • Prediabetes   • Class 2 severe obesity due to excess calories with serious comorbidity in adult   • Other fatigue   • Paroxysmal atrial fibrillation   • PVC's (premature ventricular contractions)   • H/O TIA (transient ischemic attack) and stroke   • CVA (cerebral vascular accident)   • Excessive daytime sleepiness   • Impacted cerumen of both ears   • Narcolepsy without cataplexy   • Acute medial meniscus tear of right knee   • First degree AV block   • Medicare annual wellness visit, subsequent            PT Neuro     Row Name 23 0900             Subjective Comments    Subjective Comments No new problems.  -JK         Precautions and Contraindications    Precautions/Limitations fall precautions  -JK         Subjective Pain    Able to rate subjective pain? yes  -JK      Pre-Treatment Pain Level 0  -JK      Post-Treatment Pain Level 0  -JK         Cognition    Overall Cognitive Status WFL  -JK         Posture/Observations    Posture/Observations Comments Pt arrived to PT without AD. Shoulders appear level. Pt has new  orthotics in shoes  -JK         Transfers    Sit-Stand [place] (Transfers) independent  -JK      Stand-Sit [place] (Transfers) independent  -JK         Gait/Stairs (Locomotion)    [place] Level (Gait) independent  -JK      Distance in Feet (Gait) 80' x 2  -JK      Pattern (Gait) step-through  -JK      Deviations/Abnormal Patterns (Gait) base of support, narrow  -JK         Balance Skills Training    Gait Balance-Level of Assistance Contact guard  -JK      Gait Balance Support No upper extremity supported  -JK      Gait Balance Activities scanning environment R/L;backwards  slow marching with high knees; all holding 2kg ball  -JK      Balance Comments alt tapping 8' step while standing on foam and holding 2kg ball  -JK            User Key  (r) = Recorded By, (t) = Taken By, (c) = Cosigned By    Initials Name Provider Type    Malorie Kerr, PT Physical Therapist                         PT Assessment/Plan     Row Name 05/18/23 0929          PT Assessment    Functional Limitations Impaired gait;Limitations in community activities;Limitation in home management;Performance in work activities;Performance in leisure activities  -JK     Impairments Balance;Gait  -JK     Assessment Comments Pt tolerated resisted LE stabilizing ex well with more emphasis on core stabliity today. Added new activities of side stepping and monster walk with resistance band while holding weighted ball, and walking tasks while holding weighted ball. Pt could tell that this recruited trunk stabilizers and helped improve his balance.  -JK           User Key  (r) = Recorded By, (t) = Taken By, (c) = Cosigned By    Initials Name Provider Type    Malorie Kerr, PT Physical Therapist                    OP Exercises     Row Name 05/18/23 0939 05/18/23 0900          Subjective Comments    Subjective Comments -- No new problems.  -JK        Subjective Pain    Able to rate subjective pain? -- yes  -JK     Pre-Treatment Pain Level --  0  -JK     Post-Treatment Pain Level -- 0  -JK        Total Minutes    91722 - PT Therapeutic Exercise Minutes 30  -JK --     15194 -  PT Neuromuscular Reeducation Minutes 15  -JK --        Exercise 2    Exercise Name 2 -- Standing ex: Hip abd, hip flex with knee ext, mini squat  -JK     Cueing 2 -- Verbal;Demo  -JK     Sets 2 -- 2  -JK     Reps 2 -- 10  -JK     Additional Comments -- medium resistance band  -JK        Exercise 6    Exercise Name 6 -- Heel cord stretch on step ( B heels;s irma heel)  -JK     Cueing 6 -- Verbal;Demo  -JK     Reps 6 -- 3x  -JK     Time 6 -- 15 sec hold  -JK        Exercise 7    Exercise Name 7 -- side step with medium resistance band above knees  -JK     Cueing 7 -- Verbal;Demo  -JK     Additional Comments -- 20' x 2 each direction; holding 2kg weighted ball  -JK        Exercise 8    Exercise Name 8 -- stand ex: hip ext; HS curls  -JK     Cueing 8 -- Verbal;Tactile;Demo  -JK     Sets 8 -- 2  -JK     Reps 8 -- 10  -JK     Additional Comments -- medium resistance band  -JK        Exercise 9    Exercise Name 9 -- monster walk fwd/back  -JK     Cueing 9 -- Verbal;Demo  -JK     Additional Comments -- 20' x 2 each direction; holding 2kg ball  -JK           User Key  (r) = Recorded By, (t) = Taken By, (c) = Cosigned By    Initials Name Provider Type    Malorie Kerr, PT Physical Therapist                                Therapy Education  Education Details: discussed why turning on the core helps with stability in all tasks  Given: HEP, Symptoms/condition management, Posture/body mechanics, Fall prevention and home safety, Mobility training  Program: New, Reinforced, Progressed  How Provided: Verbal, Demonstration  Provided to: Patient  Level of Understanding: Teach back education performed, Verbalized, Demonstrated              Time Calculation:   Start Time: 0845  Stop Time: 0930  Time Calculation (min): 45 min  Total Timed Code Minutes- PT: 45 minute(s)  Timed Charges  83398 - PT  Therapeutic Exercise Minutes: 30  52964 -  PT Neuromuscular Reeducation Minutes: 15  Total Minutes  Timed Charges Total Minutes: 45   Total Minutes: 45   Therapy Charges for Today     Code Description Service Date Service Provider Modifiers Qty    [id number]  PT NEUROMUSC RE EDUCATION EA 15 MIN 5/18/2023 Malorie Seay, PT GP 1    [id number]  PT THER PROC EA 15 MIN 5/18/2023 Malorie Seay, PT GP 2                    Malorie Seay, PT  5/18/2023

## 2023-05-23 ENCOUNTER — APPOINTMENT (OUTPATIENT)
Dept: PHYSICAL THERAPY | Facility: HOSPITAL | Age: 68
End: 2023-05-23
Payer: MEDICARE

## 2023-05-25 ENCOUNTER — OFFICE VISIT (OUTPATIENT)
Dept: CARDIOLOGY | Facility: CLINIC | Age: 68
End: 2023-05-25
Payer: MEDICARE

## 2023-05-25 ENCOUNTER — APPOINTMENT (OUTPATIENT)
Dept: PHYSICAL THERAPY | Facility: HOSPITAL | Age: 68
End: 2023-05-25
Payer: MEDICARE

## 2023-05-25 VITALS
OXYGEN SATURATION: 96 % | WEIGHT: 285.8 LBS | BODY MASS INDEX: 38.71 KG/M2 | SYSTOLIC BLOOD PRESSURE: 142 MMHG | DIASTOLIC BLOOD PRESSURE: 88 MMHG | HEART RATE: 72 BPM | HEIGHT: 72 IN

## 2023-05-25 DIAGNOSIS — G47.33 OBSTRUCTIVE SLEEP APNEA: ICD-10-CM

## 2023-05-25 DIAGNOSIS — G47.419 NARCOLEPSY WITHOUT CATAPLEXY: ICD-10-CM

## 2023-05-25 DIAGNOSIS — R53.83 OTHER FATIGUE: Primary | ICD-10-CM

## 2023-05-25 DIAGNOSIS — I48.0 PAROXYSMAL ATRIAL FIBRILLATION: ICD-10-CM

## 2023-05-25 DIAGNOSIS — R00.2 PALPITATIONS: ICD-10-CM

## 2023-05-25 DIAGNOSIS — R06.09 DYSPNEA ON EXERTION: ICD-10-CM

## 2023-05-25 DIAGNOSIS — I10 PRIMARY HYPERTENSION: ICD-10-CM

## 2023-05-25 DIAGNOSIS — I49.3 PVC'S (PREMATURE VENTRICULAR CONTRACTIONS): ICD-10-CM

## 2023-05-25 DIAGNOSIS — I44.0 FIRST DEGREE AV BLOCK: ICD-10-CM

## 2023-05-25 PROBLEM — S83.241A ACUTE MEDIAL MENISCUS TEAR OF RIGHT KNEE: Status: RESOLVED | Noted: 2019-01-28 | Resolved: 2023-05-25

## 2023-05-25 PROBLEM — E66.812 CLASS 2 SEVERE OBESITY DUE TO EXCESS CALORIES WITH SERIOUS COMORBIDITY IN ADULT: Status: RESOLVED | Noted: 2020-01-03 | Resolved: 2023-05-25

## 2023-05-25 PROBLEM — E66.01 CLASS 2 SEVERE OBESITY DUE TO EXCESS CALORIES WITH SERIOUS COMORBIDITY IN ADULT: Status: RESOLVED | Noted: 2020-01-03 | Resolved: 2023-05-25

## 2023-05-25 PROCEDURE — 93000 ELECTROCARDIOGRAM COMPLETE: CPT | Performed by: NURSE PRACTITIONER

## 2023-05-25 PROCEDURE — 99214 OFFICE O/P EST MOD 30 MIN: CPT | Performed by: NURSE PRACTITIONER

## 2023-05-25 PROCEDURE — 1159F MED LIST DOCD IN RCRD: CPT | Performed by: NURSE PRACTITIONER

## 2023-05-25 PROCEDURE — 3079F DIAST BP 80-89 MM HG: CPT | Performed by: NURSE PRACTITIONER

## 2023-05-25 PROCEDURE — 3077F SYST BP >= 140 MM HG: CPT | Performed by: NURSE PRACTITIONER

## 2023-05-25 PROCEDURE — 1160F RVW MEDS BY RX/DR IN RCRD: CPT | Performed by: NURSE PRACTITIONER

## 2023-05-25 NOTE — PROGRESS NOTES
Date of Office Visit: 2023  Encounter Provider: JENNIFER Goff  Place of Service: [facility] CARDIOLOGY  Patient Name: Jose Ramon Doll  :1955  Primary Cardiologist: Dr. Daria Daniels    Chief Complaint   Patient presents with   • Follow-up   • Atrial Fibrillation   • Fatigue   :     HPI: Jose Ramon Doll is a 68 y.o. male who presents today for evaluation of atrial fibrillation and fatigue.  I have reviewed his medical records.    He has been diagnosed with hypertension, GERD, narcolepsy, renal calculi, and obstructive sleep apnea on BiPAP.     In 2020, he presented to [facility] ED and suffered a right ventral pontine stroke felt to be due to hypertension.  Echocardiogram showed normal LVEF, mild LVH, and aorta measuring 3.8 cm.  24-hour Holter showed normal sinus rhythm with PVCs.     In 2021, he presented to the ED with weakness and was diagnosed with PVCs in bigeminal pattern.      In 2021, he wore a 14-day Holter monitor which showed paroxysmal atrial fibrillation and he was started on apixaban.  He has a known first-degree AV block and PVCs.    In , he was transitioned from apixaban to rivaroxaban because of cost.    He recently sent a [employer] message stating that he is having daily episodes of atrial fibrillation, extreme fatigue, and brain fog.    He presents today for follow-up visit and his wife is accompanying him.  He says he has been fatigued since his stroke.  He has found that if he cuts grass or rides a stationary bicycle that he feels his heart beating faster and harder and then he is fatigued for about 30 to 90 minutes afterwards.  He also notices just generalized fatigue.  He is using his BiPAP machine at nighttime, but has not been checked in a while.  He reports some shortness of breath with bending over and with exertion.  Occasionally he has some dizziness.  He also says when he was on  Adderall he was less fatigued and has brain fog to resolve.  Now he is on Ritalin because he can get the medication.      Past Medical History:   Diagnosis Date   • Abnormal EKG     IN PAST   • Acute medial meniscus tear of right knee 1/28/2019    Added automatically from request for surgery [id number]  Formatting of this note might be different from the original. Added automatically from request for surgery [id number]  Formatting of this note might be different from the original. Added automatically from request for surgery [id number]   • Allergic    • Arthritis    • ED (erectile dysfunction)    • First degree AV block    • GERD (gastroesophageal reflux disease)    • Glaucoma    • Hypertension     IN PAST  NO MEDS   • Kidney stone    • Narcolepsy    • Paroxysmal atrial fibrillation    • PONV (postoperative nausea and vomiting)     1969 AND 1998   • PVCs (premature ventricular contractions)    • Sleep apnea    • Urinary tract infection        Past Surgical History:   Procedure Laterality Date   • ANAL FISTULOTOMY     • APPENDECTOMY     • BACK SURGERY      CERVICAL   • CATARACT EXTRACTION Bilateral    • COLONOSCOPY     • KNEE ARTHROSCOPY Right 02/12/2019    Procedure: Knee Arthroscopy with PARTICIAL MEDIAL Menisectomy;  Surgeon: Dwaine Connolly MD;  [place] [facility] [place] [facility] [place]  Service: Orthopedics   • NECK SURGERY     • WISDOM TOOTH EXTRACTION         Social History     Socioeconomic History   • Marital status:    Tobacco Use   • Smoking status: Never   • Smokeless tobacco: Former     Types: Chew     Quit date: 2020   • Tobacco comments:     Caffeine use: daily   Vaping Use   • Vaping Use: Never used   Substance and Sexual Activity   • Alcohol use: Yes     Alcohol/week: 0.0 - 2.0 standard drinks   • Drug use: Never   • Sexual activity: Yes     Partners: Female     Birth control/protection: Hysterectomy       Family History   Problem Relation Age of Onset   • Diabetes Mother    • Thyroid disease Mother    • Hypertension  Mother    • Diabetes Father    • Heart disease Father    • Hypertension Father    • Atrial fibrillation Father    • Cancer Father    • Stroke Maternal Grandfather    • Stroke Paternal Grandfather    • Malig Hyperthermia Neg Hx        The following portion of the patient's history were reviewed and updated as appropriate: past medical history, past surgical history, past social history, past family history, allergies, current medications, and problem list.    Review of Systems   Constitutional: Positive for malaise/fatigue.   Cardiovascular: Positive for dyspnea on exertion and palpitations.   Respiratory: Positive for shortness of breath.    Neurological: Positive for excessive daytime sleepiness and dizziness.       Allergies   Allergen Reactions   • Clarithromycin Other (See Comments)     Unable to eat    • Sulfa Antibiotics Myalgia         Current Outpatient Medications:   •  azelastine (ASTELIN) 0.1 % nasal spray, 2 sprays into the nostril(s) as directed by provider 2 (Two) Times a Day. Use in each nostril as directed, Disp: 90 mL, Rfl: 3  •  brimonidine-timolol (COMBIGAN) 0.2-0.5 % ophthalmic solution, 1 drop Every 12 (Twelve) Hours., Disp: , Rfl:   •  fluticasone (FLONASE) 50 MCG/ACT nasal spray, 2 sprays into the nostril(s) as directed by provider Daily., Disp: 18.2 mL, Rfl: 3  •  methylphenidate (RITALIN) 20 MG tablet, , Disp: , Rfl:   •  metoprolol succinate XL (TOPROL-XL) 25 MG 24 hr tablet, Take 1 tablet by mouth 2 (Two) Times a Day., Disp: 180 tablet, Rfl: 3  •  rivaroxaban (Xarelto) 20 MG tablet, Take 1 tablet by mouth Daily With Dinner., Disp: 90 tablet, Rfl: 0  •  timolol (TIMOPTIC) 0.5 % ophthalmic solution, Apply  to eye(s) as directed by provider Every 12 (Twelve) Hours., Disp: , Rfl:   •  valsartan (DIOVAN) 160 MG tablet, Take 1 tablet by mouth 2 (Two) Times a Day., Disp: 180 tablet, Rfl: 2  •  rosuvastatin (CRESTOR) 20 MG tablet, Take 1 tablet by mouth every night at bedtime for 90 days., Disp:  "90 tablet, Rfl: 3         Objective:     Vitals:    05/25/23 1527   BP: 142/88   BP Location: Left arm   Patient Position: Sitting   Cuff Size: Adult   Pulse: 72   SpO2: 96%   Weight: 130 kg (285 lb 12.8 oz)   Height: 182.9 cm (72.01\")     Body mass index is 38.75 kg/m².    PHYSICAL EXAM:    Vitals Reviewed.   General Appearance: No acute distress, well developed and well nourished. Obese.   Eyes: Glasses.   HENT: No hearing loss noted.    Respiratory: No signs of respiratory distress. Respiration rhythm and depth normal.  Clear to auscultation.   Cardiovascular:  Jugular Venous Pressure: Normal  Heart Rate and Rhythm: Normal, Heart Sounds: Normal S1 and S2. No S3 or S4 noted.  Murmurs: No murmurs noted. No rubs, thrills, or gallops.   Lower Extremities: No edema noted.  Musculoskeletal: Normal movement of extremities.  Skin: General appearance normal.    Psychiatric: Patient alert and oriented to person, place, and time. Speech and behavior appropriate. Normal mood and affect.       ECG 12 Lead    Date/Time: 5/25/2023 3:28 PM  Performed by: Lexi Andres APRN  Authorized by: Lexi Andres APRN   Comparison: compared with previous ECG from 6/23/2022  Comparison to previous ECG: NSR with PVC, first degree AV block   Rhythm: sinus rhythm  Ectopy: unifocal PVCs  Rate: normal  BPM: 72  Conduction: 1st degree AV block  ST Segments: ST segments normal  T Waves: T waves normal  QRS axis: normal    Clinical impression: abnormal EKG              Assessment:       Diagnosis Plan   1. Other fatigue        2. Dyspnea on exertion        3. Paroxysmal atrial fibrillation  Adult Transthoracic Echo Complete w/ Color, Spectral and Contrast if Necessary Per Protocol    Holter Monitor - 72 Hour Up To 15 Days      4. Palpitations  Adult Transthoracic Echo Complete w/ Color, Spectral and Contrast if Necessary Per Protocol    Holter Monitor - 72 Hour Up To 15 Days      5. PVC's (premature ventricular contractions)  Adult " Transthoracic Echo Complete w/ Color, Spectral and Contrast if Necessary Per Protocol    Holter Monitor - 72 Hour Up To 15 Days      6. First degree AV block        7. Primary hypertension        8. Narcolepsy without cataplexy        9. Obstructive sleep apnea               Plan:       He has been diagnosed with paroxysmal atrial fibrillation and PVCs.  He has narcolepsy and obstructive sleep apnea.  He presents with a chief concern of dyspnea with exertion, faster/harder heartbeats, and extreme fatigue.    He feels fatigued today and he is in a normal sinus rhythm.  Blood pressure is borderline elevated.  He has known bradycardia and first-degree AV block.    I have recommended the followin-day Holter monitor and echocardiogram.  I reviewed his recent blood work from his PCP office which was stable.  He has bradycardia and first-degree AV block and I may need to decrease his beta-blocker to see if that helps his fatigue.  He also needs to have his BiPAP settings rechecked.    Further recommendations to follow.    As always, it has been a pleasure to participate in your patient's care. Thank you.         Sincerely,         JENNIFER Iniguez  [facility] Cardiology      · Dictated utilizing Dragon Dictation  · I spent 35 minutes reviewing her medical records/testing/previous office notes/labs, face-to-face interaction with patient, physical examination, formulating the plan of care, and discussion of plan of care with patient.

## 2023-05-30 ENCOUNTER — HOSPITAL ENCOUNTER (OUTPATIENT)
Dept: PHYSICAL THERAPY | Facility: HOSPITAL | Age: 68
Setting detail: THERAPIES SERIES
Discharge: HOME | End: 2023-05-30
Payer: MEDICARE

## 2023-05-30 DIAGNOSIS — Z74.09 IMPAIRED FUNCTIONAL MOBILITY, BALANCE, GAIT, AND ENDURANCE: Primary | ICD-10-CM

## 2023-05-30 DIAGNOSIS — I63.9 CEREBROVASCULAR ACCIDENT (CVA), UNSPECIFIED MECHANISM: ICD-10-CM

## 2023-05-30 PROCEDURE — 97112 NEUROMUSCULAR REEDUCATION: CPT

## 2023-05-30 PROCEDURE — 97110 THERAPEUTIC EXERCISES: CPT

## 2023-05-30 NOTE — THERAPY TREATMENT NOTE
Outpatient Physical Therapy Neuro Treatment Note  [facility]     Patient Name: Jose Ramon Doll  : 1955  MRN: [id number]  Today's Date: 2023      Visit Date: 2023    Visit Dx:    ICD-10-CM ICD-9-CM   1. Impaired functional mobility, balance, gait, and endurance  Z74.09 V49.89   2. Cerebrovascular accident (CVA), unspecified mechanism  I63.9 434.91       Patient Active Problem List   Diagnosis   • Cervical spinal stenosis   • ED (erectile dysfunction) of non-organic origin   • Hypertension   • Lumbar radiculopathy   • Neoplasm of skin   • Obstructive sleep apnea   • Neck pain   • Benign non-nodular prostatic hyperplasia with lower urinary tract symptoms   • Shoulder pain, acute   • Urinary frequency   • Chronic pain of right knee   • Rotator cuff tendonitis   • Prediabetes   • Other fatigue   • Paroxysmal atrial fibrillation   • PVC's (premature ventricular contractions)   • H/O TIA (transient ischemic attack) and stroke   • CVA (cerebral vascular accident)   • Excessive daytime sleepiness   • Impacted cerumen of both ears   • Narcolepsy without cataplexy   • Acute medial meniscus tear of right knee   • First degree AV block   • Medicare annual wellness visit, subsequent            PT Neuro     Row Name 23 2174             Subjective Comments    Subjective Comments Pt stated he was in [place] this weekend and walked a lot and felt that he coundn't do his exercises when he got back due to soreness  -DP         Precautions and Contraindications    Precautions/Limitations fall precautions  -DP         Subjective Pain    Able to rate subjective pain? yes  -DP      Pre-Treatment Pain Level 0  -DP      Post-Treatment Pain Level 0  -DP         Cognition    Overall Cognitive Status WFL  -DP         Transfers    Sit-Stand [place] (Transfers) independent  -DP      Stand-Sit [place] (Transfers) independent  -DP         Gait/Stairs (Locomotion)    [place] Level (Gait)  independent  -DP      Assistive Device (Gait) --  no AD  -DP      Distance in Feet (Gait) 100'2  -DP      Pattern (Gait) step-through  -DP      Deviations/Abnormal Patterns (Gait) base of support, narrow  -DP      Comment, (Gait/Stairs) perfomed ambulation while holding onto book to simulate duties as  with some sway to the left with increased R hip frontal movement  -DP         Balance Skills Training    Stepping Strategy Assessment (Balance) pt performed MIP on gray quin discs 10x2 with second set holding 3kg ball. Standing on gray quin discs with trunk rotation using BTB 10x esach side. CGA to [place]  -DP            User Key  (r) = Recorded By, (t) = Taken By, (c) = Cosigned By    Initials Name Provider Type    Kirby Dodd PT Physical Therapist                         PT Assessment/Plan     Row Name 05/30/23 1239          PT Assessment    Functional Limitations Impaired gait;Limitations in community activities;Limitation in home management;Performance in work activities;Performance in leisure activities  -DP     Impairments Balance;Gait  -DP     Assessment Comments Pt tolerated increased balance exercises well today stading on gray quin discs and with walking while reading a book for simulation of crystal duties. Pt tolerated standing ther ex well and was able to adjust and avoid muscle compensation with VC from PT.  -DP           User Key  (r) = Recorded By, (t) = Taken By, (c) = Cosigned By    Initials Name Provider Type    Kirby Dodd PT Physical Therapist                    OP Exercises     Row Name 05/30/23 4716             Subjective Comments    Subjective Comments Pt stated he was in [place] this weekend and walked a lot and felt that he coundn't do his exercises when he got back due to soreness  -DP         Subjective Pain    Able to rate subjective pain? yes  -DP      Pre-Treatment Pain Level 0  -DP      Post-Treatment Pain Level 0  -DP         Total Minutes    24543 - PT Therapeutic  Exercise Minutes 15  -DP      69987 -  PT Neuromuscular Reeducation Minutes 30  -DP         Exercise 2    Exercise Name 2 Standing ex: Hip abd, hip flex with knee ext, mini squat  -DP      Cueing 2 Verbal;Demo  -DP      Sets 2 2  -DP      Reps 2 10  -DP      Additional Comments no UE for mini squats, BTB for others  -DP         Exercise 6    Exercise Name 6 Heel cord stretch on step ( B heels;s irma heel)  -DP      Cueing 6 Verbal;Demo  -DP      Reps 6 3x  -DP      Time 6 15 sec hold  -DP            User Key  (r) = Recorded By, (t) = Taken By, (c) = Cosigned By    Initials Name Provider Type    DP Kirby Mathews PT Physical Therapist                                Therapy Education  Education Details: discussed hip imbalance with gait deviation and balance difficulties  Given: HEP, Symptoms/condition management, Posture/body mechanics, Fall prevention and home safety, Mobility training  Program: New  How Provided: Verbal, Demonstration  Provided to: Patient              Time Calculation:   Start Time: 0845  Stop Time: 0930  Time Calculation (min): 45 min  Total Timed Code Minutes- PT: 45 minute(s)  Timed Charges  39937 - PT Therapeutic Exercise Minutes: 15  08501 -  PT Neuromuscular Reeducation Minutes: 30  Total Minutes  Timed Charges Total Minutes: 45   Total Minutes: 45   Therapy Charges for Today     Code Description Service Date Service Provider Modifiers Qty    73822346948  PT NEUROMUSC RE EDUCATION EA 15 MIN 5/30/2023 Kirby Mathews PT GP 2    [id number]  PT THER PROC EA 15 MIN 5/30/2023 Kirby Mathews, PT GP 1                    Kirby Mathews PT  5/30/2023

## 2023-05-30 NOTE — THERAPY TREATMENT NOTE
Outpatient Physical Therapy Neuro Treatment Note  [facility]     Patient Name: Jose Ramon Doll  : 1955  MRN: [id number]  Today's Date: 2023      Visit Date: 2023    Visit Dx:    ICD-10-CM ICD-9-CM   1. Impaired functional mobility, balance, gait, and endurance  Z74.09 V49.89   2. Cerebrovascular accident (CVA), unspecified mechanism  I63.9 434.91       Patient Active Problem List   Diagnosis   • Cervical spinal stenosis   • ED (erectile dysfunction) of non-organic origin   • Hypertension   • Lumbar radiculopathy   • Neoplasm of skin   • Obstructive sleep apnea   • Neck pain   • Benign non-nodular prostatic hyperplasia with lower urinary tract symptoms   • Shoulder pain, acute   • Urinary frequency   • Chronic pain of right knee   • Rotator cuff tendonitis   • Prediabetes   • Other fatigue   • Paroxysmal atrial fibrillation   • PVC's (premature ventricular contractions)   • H/O TIA (transient ischemic attack) and stroke   • CVA (cerebral vascular accident)   • Excessive daytime sleepiness   • Impacted cerumen of both ears   • Narcolepsy without cataplexy   • Acute medial meniscus tear of right knee   • First degree AV block   • Medicare annual wellness visit, subsequent            PT Neuro     Row Name 23 8187             Subjective Comments    Subjective Comments Pt stated he was in [place] this weekend and walked a lot and felt that he coundn't do his exercises when he got back due to soreness  -DP         Precautions and Contraindications    Precautions/Limitations fall precautions  -DP         Subjective Pain    Able to rate subjective pain? yes  -DP      Pre-Treatment Pain Level 0  -DP      Post-Treatment Pain Level 0  -DP         Cognition    Overall Cognitive Status WFL  -DP         Transfers    Sit-Stand [place] (Transfers) independent  -DP      Stand-Sit [place] (Transfers) independent  -DP         Gait/Stairs (Locomotion)    [place] Level (Gait)  independent  -DP      Assistive Device (Gait) --  no AD  -DP      Distance in Feet (Gait) 100'2  -DP      Pattern (Gait) step-through  -DP      Deviations/Abnormal Patterns (Gait) base of support, narrow  -DP      Comment, (Gait/Stairs) perfomed ambulation while holding onto book to simulate duties as  with some sway to the left with increased R hip frontal movement  -DP         Balance Skills Training    Stepping Strategy Assessment (Balance) pt performed MIP on gray quin discs 10x2 with second set holding 3kg ball. Standing on gray quin discs with trunk rotation using BTB 10x esach side. CGA to [place]  -DP            User Key  (r) = Recorded By, (t) = Taken By, (c) = Cosigned By    Initials Name Provider Type    Kirby Dodd PT Physical Therapist                         PT Assessment/Plan     Row Name 05/30/23 1239          PT Assessment    Functional Limitations Impaired gait;Limitations in community activities;Limitation in home management;Performance in work activities;Performance in leisure activities  -DP     Impairments Balance;Gait  -DP     Assessment Comments Pt tolerated increased balance exercises well today stading on gray quin discs and with walking while reading a book for simulation of crystal duties. Pt tolerated standing ther ex well and was able to adjust and avoid muscle compensation with VC from PT.  -DP           User Key  (r) = Recorded By, (t) = Taken By, (c) = Cosigned By    Initials Name Provider Type    Kirby Dodd PT Physical Therapist                    OP Exercises     Row Name 05/30/23 9786             Subjective Comments    Subjective Comments Pt stated he was in [place] this weekend and walked a lot and felt that he coundn't do his exercises when he got back due to soreness  -DP         Subjective Pain    Able to rate subjective pain? yes  -DP      Pre-Treatment Pain Level 0  -DP      Post-Treatment Pain Level 0  -DP         Total Minutes    30077 - PT Therapeutic  Exercise Minutes 15  -DP      61603 -  PT Neuromuscular Reeducation Minutes 30  -DP         Exercise 1    Exercise Name 1 d1 flexion ext with 3kg med ball standing on gray discs  -DP      Cueing 1 Verbal;Demo  -DP      Sets 1 2  -DP      Reps 1 10  -DP         Exercise 2    Exercise Name 2 Standing ex: Hip abd, hip flex with knee ext, mini squat  -DP      Cueing 2 Verbal;Demo  -DP      Sets 2 2  -DP      Reps 2 10  -DP      Additional Comments no UE for mini squats, BTB for others  -DP         Exercise 6    Exercise Name 6 Heel cord stretch on step ( B heels;s irma heel)  -DP      Cueing 6 Verbal;Demo  -DP      Reps 6 3x  -DP      Time 6 15 sec hold  -DP            User Key  (r) = Recorded By, (t) = Taken By, (c) = Cosigned By    Initials Name Provider Type    Kirby Dodd PT Physical Therapist                                Therapy Education  Education Details: discussed hip imbalance with gait deviation and balance difficulties  Given: HEP, Symptoms/condition management, Posture/body mechanics, Fall prevention and home safety, Mobility training  Program: New  How Provided: Verbal, Demonstration  Provided to: Patient              Time Calculation:   Start Time: 0845  Stop Time: 0930  Time Calculation (min): 45 min  Total Timed Code Minutes- PT: 45 minute(s)  Timed Charges  00965 - PT Therapeutic Exercise Minutes: 15  53232 -  PT Neuromuscular Reeducation Minutes: 30  Total Minutes  Timed Charges Total Minutes: 45   Total Minutes: 45   Therapy Charges for Today     Code Description Service Date Service Provider Modifiers Qty    06808741049 HC PT NEUROMUSC RE EDUCATION EA 15 MIN 5/30/2023 Kirby Mathews PT GP 2    [id number] HC PT THER PROC EA 15 MIN 5/30/2023 Kirby Mathews PT GP 1                    Kirby Mathews PT  5/30/2023

## 2023-06-06 ENCOUNTER — HOSPITAL ENCOUNTER (OUTPATIENT)
Dept: PHYSICAL THERAPY | Facility: HOSPITAL | Age: 68
Setting detail: THERAPIES SERIES
Discharge: HOME | End: 2023-06-06
Payer: MEDICARE

## 2023-06-06 DIAGNOSIS — I63.9 CEREBROVASCULAR ACCIDENT (CVA), UNSPECIFIED MECHANISM: ICD-10-CM

## 2023-06-06 DIAGNOSIS — Z74.09 IMPAIRED FUNCTIONAL MOBILITY, BALANCE, GAIT, AND ENDURANCE: Primary | ICD-10-CM

## 2023-06-06 PROCEDURE — 97110 THERAPEUTIC EXERCISES: CPT

## 2023-06-06 PROCEDURE — 97112 NEUROMUSCULAR REEDUCATION: CPT

## 2023-06-06 NOTE — THERAPY TREATMENT NOTE
Outpatient Physical Therapy Neuro Treatment Note  [facility]     Patient Name: Jose Ramon Doll  : 1955  MRN: [id number]  Today's Date: 2023      Visit Date: 2023    Visit Dx:    ICD-10-CM ICD-9-CM   1. Impaired functional mobility, balance, gait, and endurance  Z74.09 V49.89   2. Cerebrovascular accident (CVA), unspecified mechanism  I63.9 434.91       Patient Active Problem List   Diagnosis    Cervical spinal stenosis    ED (erectile dysfunction) of non-organic origin    Hypertension    Lumbar radiculopathy    Neoplasm of skin    Obstructive sleep apnea    Neck pain    Benign non-nodular prostatic hyperplasia with lower urinary tract symptoms    Shoulder pain, acute    Urinary frequency    Chronic pain of right knee    Rotator cuff tendonitis    Prediabetes    Other fatigue    Paroxysmal atrial fibrillation    PVC's (premature ventricular contractions)    H/O TIA (transient ischemic attack) and stroke    CVA (cerebral vascular accident)    Excessive daytime sleepiness    Impacted cerumen of both ears    Narcolepsy without cataplexy    Acute medial meniscus tear of right knee    First degree AV block    Medicare annual wellness visit, subsequent            PT Neuro       Row Name 23 0800             Subjective Comments    Subjective Comments Pt stated that he was very tired today as he has sleep apnea and narcolepsy and his medication changed and says it is not working as well  -DP         Precautions and Contraindications    Precautions/Limitations fall precautions  -DP         Subjective Pain    Able to rate subjective pain? yes  -DP      Pre-Treatment Pain Level 0  -DP      Post-Treatment Pain Level 0  -DP         Cognition    Overall Cognitive Status WFL  -DP         Posture/Observations    Posture/Observations Comments Pt arrived to PT without AD. Shoulders appear level. Pt has new orthotics in shoes  -DP         Transfers    Sit-Stand [place] (Transfers) independent  -DP       Stand-Sit [place] (Transfers) independent  -DP         Gait/Stairs (Locomotion)    [place] Level (Gait) independent  -DP      Distance in Feet (Gait) 100'x2  -DP      Pattern (Gait) step-through  -DP      Deviations/Abnormal Patterns (Gait) base of support, narrow  -DP         Balance Skills Training    Gait Balance Activities foam beam;tandem  -DP      Balance Comments Performed VSR for 20 mins with limit of stability and had increased difficulty with anterior weightshift compared to the other directions. Pt also performed 5 partial squats in order to see frontal plane weightshift percentage and was able to maintain within 10% of 50% for all squats. Pt then performed standing on bosu ball for 15 seconds and then again for 30 seconds between CGA/[place].Pt stood on gray discs while hitting beach ball with dowel glenna with CGA for majority of task aside from [place] on last rep. pt performed MIP on black foam used for VSR in // bars 10 times each with CGA  -DP                User Key  (r) = Recorded By, (t) = Taken By, (c) = Cosigned By      Initials Name Provider Type    Kirby Dodd PT Physical Therapist                             PT Assessment/Plan       Row Name 06/06/23 1219          PT Assessment    Functional Limitations Impaired gait;Limitations in community activities;Limitation in home management;Performance in work activities;Performance in leisure activities  -DP     Assessment Comments Pt tolerated VSR training today along with several balance intervention outline in chart but did demonstrate decreased ability for weight shift anteriorly with feet planted. Pt improving with equal weightshift on squat as pt was able to limit lateral weight shift to <= 10% on either side  -DP               User Key  (r) = Recorded By, (t) = Taken By, (c) = Cosigned By      Initials Name Provider Type    Kirby Dodd PT Physical Therapist                        OP Exercises       Row Name 06/06/23 0800              Subjective Comments    Subjective Comments Pt stated that he was very tired today as he has sleep apnea and narcolepsy and his medication changed and says it is not working as well  -DP         Subjective Pain    Able to rate subjective pain? yes  -DP      Pre-Treatment Pain Level 0  -DP      Post-Treatment Pain Level 0  -DP         Total Minutes    03103 - PT Therapeutic Exercise Minutes 15  -DP      89759 -  PT Neuromuscular Reeducation Minutes 30  -DP         Exercise 12    Exercise Name 12 mini squat at reggie bar  -DP      Cueing 12 Verbal;Demo  -DP      Reps 12 10  -DP      Additional Comments holding 3kg ball  -DP         Exercise 13    Exercise Name 13 lateral step outs 3x in each direction  holding onto bands with BUE out infront of pt to avoid letting bands rotate trunk  -DP      Cueing 13 Verbal;Demo  -DP      Sets 13 1  -DP      Reps 13 5  -DP      Additional Comments GTB and BTB at same time  -DP                User Key  (r) = Recorded By, (t) = Taken By, (c) = Cosigned By      Initials Name Provider Type    DP Kirby Mathews, PT Physical Therapist                                    Therapy Education  Education Details: educated that chidi chi is good for balance and that pt could do this  Given: HEP, Symptoms/condition management, Posture/body mechanics, Fall prevention and home safety, Mobility training  Program: New  How Provided: Verbal, Demonstration  Provided to: Patient  Level of Understanding: Verbalized              Time Calculation:   Start Time: 0835  Stop Time: 0920  Time Calculation (min): 45 min  Total Timed Code Minutes- PT: 45 minute(s)  Timed Charges  31853 - PT Therapeutic Exercise Minutes: 15  16165 -  PT Neuromuscular Reeducation Minutes: 30  Total Minutes  Timed Charges Total Minutes: 45   Total Minutes: 45   Therapy Charges for Today       Code Description Service Date Service Provider Modifiers Qty    [id number]  PT NEUROMUSC RE EDUCATION EA 15 MIN 6/6/2023 Bisi  Kirby, PT GP 2    [id number]  PT THER PROC EA 15 MIN 6/6/2023 Kirby Mathews, PT GP 1                      Kirby Mathews, PT  6/6/2023

## 2023-06-08 ENCOUNTER — HOSPITAL ENCOUNTER (OUTPATIENT)
Dept: CARDIOLOGY | Facility: HOSPITAL | Age: 68
Discharge: HOME | End: 2023-06-08
Admitting: NURSE PRACTITIONER
Payer: MEDICARE

## 2023-06-08 ENCOUNTER — HOSPITAL ENCOUNTER (OUTPATIENT)
Dept: PHYSICAL THERAPY | Facility: HOSPITAL | Age: 68
Setting detail: THERAPIES SERIES
Discharge: HOME | End: 2023-06-08
Payer: MEDICARE

## 2023-06-08 VITALS
HEIGHT: 72 IN | WEIGHT: 285 LBS | HEART RATE: 65 BPM | BODY MASS INDEX: 38.6 KG/M2 | SYSTOLIC BLOOD PRESSURE: 109 MMHG | DIASTOLIC BLOOD PRESSURE: 57 MMHG | OXYGEN SATURATION: 95 %

## 2023-06-08 DIAGNOSIS — I63.9 CEREBROVASCULAR ACCIDENT (CVA), UNSPECIFIED MECHANISM: ICD-10-CM

## 2023-06-08 DIAGNOSIS — I48.0 PAROXYSMAL ATRIAL FIBRILLATION: ICD-10-CM

## 2023-06-08 DIAGNOSIS — I49.3 PVC'S (PREMATURE VENTRICULAR CONTRACTIONS): ICD-10-CM

## 2023-06-08 DIAGNOSIS — Z74.09 IMPAIRED FUNCTIONAL MOBILITY, BALANCE, GAIT, AND ENDURANCE: Primary | ICD-10-CM

## 2023-06-08 DIAGNOSIS — R00.2 PALPITATIONS: ICD-10-CM

## 2023-06-08 LAB
AORTIC ARCH: 2.9 CM
AORTIC DIMENSIONLESS INDEX: 1 (DI)
ASCENDING AORTA: 3.8 CM
BH CV ECHO MEAS - ACS: 2.17 CM
BH CV ECHO MEAS - AO MAX PG: 5.6 MMHG
BH CV ECHO MEAS - AO MEAN PG: 2.6 MMHG
BH CV ECHO MEAS - AO ROOT DIAM: 3.6 CM
BH CV ECHO MEAS - AO V2 MAX: 118.1 CM/SEC
BH CV ECHO MEAS - AO V2 VTI: 21.8 CM
BH CV ECHO MEAS - AVA(I,D): 3.6 CM2
BH CV ECHO MEAS - EDV(CUBED): 135.3 ML
BH CV ECHO MEAS - EDV(MOD-SP2): 118 ML
BH CV ECHO MEAS - EDV(MOD-SP4): 169 ML
BH CV ECHO MEAS - EF(MOD-BP): 61.4 %
BH CV ECHO MEAS - EF(MOD-SP2): 59.3 %
BH CV ECHO MEAS - EF(MOD-SP4): 60.9 %
BH CV ECHO MEAS - ESV(CUBED): 48.1 ML
BH CV ECHO MEAS - ESV(MOD-SP2): 48 ML
BH CV ECHO MEAS - ESV(MOD-SP4): 66 ML
BH CV ECHO MEAS - FS: 29.1 %
BH CV ECHO MEAS - IVS/LVPW: 1.02 CM
BH CV ECHO MEAS - IVSD: 1.33 CM
BH CV ECHO MEAS - LAT PEAK E' VEL: 7.3 CM/SEC
BH CV ECHO MEAS - LV DIASTOLIC VOL/BSA (35-75): 68.2 CM2
BH CV ECHO MEAS - LV MASS(C)D: 276.6 GRAMS
BH CV ECHO MEAS - LV MAX PG: 5 MMHG
BH CV ECHO MEAS - LV MEAN PG: 1.97 MMHG
BH CV ECHO MEAS - LV SYSTOLIC VOL/BSA (12-30): 26.7 CM2
BH CV ECHO MEAS - LV V1 MAX: 111.6 CM/SEC
BH CV ECHO MEAS - LV V1 VTI: 20.8 CM
BH CV ECHO MEAS - LVIDD: 5.1 CM
BH CV ECHO MEAS - LVIDS: 3.6 CM
BH CV ECHO MEAS - LVOT AREA: 3.7 CM2
BH CV ECHO MEAS - LVOT DIAM: 2.18 CM
BH CV ECHO MEAS - LVPWD: 1.3 CM
BH CV ECHO MEAS - MED PEAK E' VEL: 5.8 CM/SEC
BH CV ECHO MEAS - MV A DUR: 0.14 SEC
BH CV ECHO MEAS - MV A MAX VEL: 77.7 CM/SEC
BH CV ECHO MEAS - MV DEC SLOPE: 287.5 CM/SEC2
BH CV ECHO MEAS - MV DEC TIME: 213 MSEC
BH CV ECHO MEAS - MV E MAX VEL: 64.6 CM/SEC
BH CV ECHO MEAS - MV E/A: 0.83
BH CV ECHO MEAS - MV MAX PG: 2.23 MMHG
BH CV ECHO MEAS - MV MEAN PG: 1.11 MMHG
BH CV ECHO MEAS - MV P1/2T: 77.4 MSEC
BH CV ECHO MEAS - MV V2 VTI: 25.7 CM
BH CV ECHO MEAS - MVA(P1/2T): 2.8 CM2
BH CV ECHO MEAS - MVA(VTI): 3 CM2
BH CV ECHO MEAS - PA ACC TIME: 0.11 SEC
BH CV ECHO MEAS - PA V2 MAX: 105.9 CM/SEC
BH CV ECHO MEAS - PULM A REVS DUR: 0.17 SEC
BH CV ECHO MEAS - PULM A REVS VEL: 33.5 CM/SEC
BH CV ECHO MEAS - PULM DIAS VEL: 33.9 CM/SEC
BH CV ECHO MEAS - PULM S/D: 1
BH CV ECHO MEAS - PULM SYS VEL: 33.9 CM/SEC
BH CV ECHO MEAS - RV MAX PG: 4.3 MMHG
BH CV ECHO MEAS - RV V1 MAX: 104.2 CM/SEC
BH CV ECHO MEAS - RV V1 VTI: 19.2 CM
BH CV ECHO MEAS - SI(MOD-SP2): 28.3 ML/M2
BH CV ECHO MEAS - SI(MOD-SP4): 41.6 ML/M2
BH CV ECHO MEAS - SV(LVOT): 77.8 ML
BH CV ECHO MEAS - SV(MOD-SP2): 70 ML
BH CV ECHO MEAS - SV(MOD-SP4): 103 ML
BH CV ECHO MEAS - TAPSE (>1.6): 2.42 CM
BH CV ECHO MEASUREMENTS AVERAGE E/E' RATIO: 9.86
BH CV XLRA - RV BASE: 3 CM
BH CV XLRA - RV LENGTH: 7.9 CM
BH CV XLRA - RV MID: 2.6 CM
BH CV XLRA - TDI S': 21 CM/SEC
LEFT ATRIUM VOLUME INDEX: 22.1 ML/M2
SINUS: 3.3 CM
STJ: 3 CM

## 2023-06-08 PROCEDURE — 25510000001 PERFLUTREN (DEFINITY) 8.476 MG IN SODIUM CHLORIDE (PF) 0.9 % 10 ML INJECTION: Performed by: NURSE PRACTITIONER

## 2023-06-08 PROCEDURE — 97110 THERAPEUTIC EXERCISES: CPT

## 2023-06-08 PROCEDURE — 93306 TTE W/DOPPLER COMPLETE: CPT

## 2023-06-08 PROCEDURE — 97112 NEUROMUSCULAR REEDUCATION: CPT

## 2023-06-08 PROCEDURE — 93306 TTE W/DOPPLER COMPLETE: CPT | Performed by: INTERNAL MEDICINE

## 2023-06-08 RX ADMIN — PERFLUTREN 2 ML: 6.52 INJECTION, SUSPENSION INTRAVENOUS at 09:44

## 2023-06-08 NOTE — PROGRESS NOTES
Please call patient.  Echocardiogram looked really good.  Heart function normal, mild LVH-recommend good blood pressure control, and his heart valves look great.  Nothing on here that would account for his symptoms.  We will await the Holter monitor results.  Thank you

## 2023-06-08 NOTE — THERAPY TREATMENT NOTE
Outpatient Physical Therapy Neuro Treatment Note  [facility]     Patient Name: Jose Ramon Doll  : 1955  MRN: [id number]  Today's Date: 2023      Visit Date: 2023    Visit Dx:    ICD-10-CM ICD-9-CM   1. Impaired functional mobility, balance, gait, and endurance  Z74.09 V49.89   2. Cerebrovascular accident (CVA), unspecified mechanism  I63.9 434.91       Patient Active Problem List   Diagnosis    Cervical spinal stenosis    ED (erectile dysfunction) of non-organic origin    Hypertension    Lumbar radiculopathy    Neoplasm of skin    Obstructive sleep apnea    Neck pain    Benign non-nodular prostatic hyperplasia with lower urinary tract symptoms    Shoulder pain, acute    Urinary frequency    Chronic pain of right knee    Rotator cuff tendonitis    Prediabetes    Other fatigue    Paroxysmal atrial fibrillation    PVC's (premature ventricular contractions)    H/O TIA (transient ischemic attack) and stroke    CVA (cerebral vascular accident)    Excessive daytime sleepiness    Impacted cerumen of both ears    Narcolepsy without cataplexy    Acute medial meniscus tear of right knee    First degree AV block    Medicare annual wellness visit, subsequent            PT Neuro       Row Name 23 1525             Subjective Comments    Subjective Comments Pt stated he had an echocardiogram today said that everything looked good. Said he is maybe getting a holter monitor on [employer].  -DP         Precautions and Contraindications    Precautions/Limitations fall precautions  -DP         Subjective Pain    Able to rate subjective pain? yes  -DP      Pre-Treatment Pain Level 0  -DP      Post-Treatment Pain Level 0  -DP         Cognition    Overall Cognitive Status WFL  -DP         Posture/Observations    Posture/Observations Comments Pt arrived to PT without AD. Shoulders appear level. Pt has new orthotics in shoes  -DP         Bed Mobility    Bed Mobility supine-sit;sit-supine  -DP      Supine-Sit  [place] (Bed Mobility) modified independence  -DP      Sit-Supine [place] (Bed Mobility) modified independence  -DP         Transfers    Sit-Stand [place] (Transfers) independent  -DP      Stand-Sit [place] (Transfers) independent  -DP         Gait/Stairs (Locomotion)    [place] Level (Gait) independent  -DP      Assistive Device (Gait) --  no AD  -DP      Distance in Feet (Gait) 100'x1  -DP      Pattern (Gait) step-through  -DP      Deviations/Abnormal Patterns (Gait) base of support, narrow  -DP         Balance Skills Training    Balance Comments Pt performed standing on solid ground with SLS for 3 sets each with 30 seconds on the LLE and 10 seconds on the RLE. Pt performed SLS again but on blue foam mat for 3 sets with 13 seconds on LLE and 4 seconds on RLE on average.  -DP                User Key  (r) = Recorded By, (t) = Taken By, (c) = Cosigned By      Initials Name Provider Type    Kirby Dodd PT Physical Therapist                             PT Assessment/Plan       Row Name 06/08/23 4796          PT Assessment    Functional Limitations Impaired gait;Limitations in community activities;Limitation in home management;Performance in work activities;Performance in leisure activities  -DP     Impairments Balance;Gait  -DP     Assessment Comments Pt tolerated SLS balance intervention on solid and uneven surface with increased ability on the LLE compared to the RLE. Today pt performed core exercises today with difficulty without using a wedge and BLE to assist against edge of table. Pt also required VC to breathe correctly during core exercises.  -DP               User Key  (r) = Recorded By, (t) = Taken By, (c) = Cosigned By      Initials Name Provider Type    Kirby Dodd PT Physical Therapist                        OP Exercises       Row Name 06/08/23 2658             Subjective Comments    Subjective Comments Pt stated he had an echocardiogram today said that everything  looked good. Said he is maybe getting a holter monitor on [employer].  -DP         Subjective Pain    Able to rate subjective pain? yes  -DP      Pre-Treatment Pain Level 0  -DP      Post-Treatment Pain Level 0  -DP         Total Minutes    27424 - PT Therapeutic Exercise Minutes 30  -DP      47753 -  PT Neuromuscular Reeducation Minutes 15  -DP         Exercise 5    Exercise Name 5 ankle DF, EV, INV  -DP      Cueing 5 Verbal;Demo  -DP      Sets 5 1  -DP      Reps 5 10  -DP      Additional Comments GTB  -DP         Exercise 6    Exercise Name 6 Side stepping with resistance band in hands  -DP      Cueing 6 Verbal;Demo  -DP      Sets 6 1  -DP      Reps 6 10  -DP      Additional Comments GTB and BTB at the same time  -DP         Exercise 7    Exercise Name 7 Eccentric sit up with 4kg ball in hand  -DP      Cueing 7 Verbal;Demo  -DP      Sets 7 2  -DP      Reps 7 10  -DP         Exercise 8    Exercise Name 8 Sit up from wedge with 4kg ball in hand.  pt sitting EOB  -DP      Cueing 8 Verbal;Tactile;Demo  -DP      Sets 8 1  -DP      Reps 8 5  -DP      Additional Comments Pt was CGA to [place] toward end of set  -DP                User Key  (r) = Recorded By, (t) = Taken By, (c) = Cosigned By      Initials Name Provider Type    DP Kirby Mathews, PT Physical Therapist                                    Therapy Education  Education Details: educated pt on HEP for ankle              Time Calculation:   Start Time: 1430  Stop Time: 1515  Time Calculation (min): 45 min  Total Timed Code Minutes- PT: 45 minute(s)  Timed Charges  50986 - PT Therapeutic Exercise Minutes: 30  68055 -  PT Neuromuscular Reeducation Minutes: 15  Total Minutes  Timed Charges Total Minutes: 45   Total Minutes: 45   Therapy Charges for Today       Code Description Service Date Service Provider Modifiers Qty    58230063432 HC PT NEUROMUSC RE EDUCATION EA 15 MIN 6/8/2023 Kirby Mathews, PT GP 1    [id number] HC PT THER PROC EA 15 MIN 6/8/2023 Kirby Mathews, PT  GP 2                      Kirby Mathews, PT  6/8/2023

## 2023-06-13 ENCOUNTER — HOSPITAL ENCOUNTER (OUTPATIENT)
Dept: PHYSICAL THERAPY | Facility: HOSPITAL | Age: 68
Setting detail: THERAPIES SERIES
Discharge: HOME | End: 2023-06-13
Payer: MEDICARE

## 2023-06-13 DIAGNOSIS — I63.9 CEREBROVASCULAR ACCIDENT (CVA), UNSPECIFIED MECHANISM: ICD-10-CM

## 2023-06-13 DIAGNOSIS — Z74.09 IMPAIRED FUNCTIONAL MOBILITY, BALANCE, GAIT, AND ENDURANCE: Primary | ICD-10-CM

## 2023-06-13 PROCEDURE — 97110 THERAPEUTIC EXERCISES: CPT | Performed by: PHYSICAL THERAPIST

## 2023-06-13 PROCEDURE — 97112 NEUROMUSCULAR REEDUCATION: CPT | Performed by: PHYSICAL THERAPIST

## 2023-06-13 NOTE — THERAPY TREATMENT NOTE
"  Outpatient Physical Therapy Neuro Treatment Note  [facility]     Patient Name: Jose Ramon Doll  : 1955  MRN: [id number]  Today's Date: 2023      Visit Date: 2023    Visit Dx:    ICD-10-CM ICD-9-CM   1. Impaired functional mobility, balance, gait, and endurance  Z74.09 V49.89   2. Cerebrovascular accident (CVA), unspecified mechanism  I63.9 434.91       Patient Active Problem List   Diagnosis    Cervical spinal stenosis    ED (erectile dysfunction) of non-organic origin    Hypertension    Lumbar radiculopathy    Neoplasm of skin    Obstructive sleep apnea    Neck pain    Benign non-nodular prostatic hyperplasia with lower urinary tract symptoms    Shoulder pain, acute    Urinary frequency    Chronic pain of right knee    Rotator cuff tendonitis    Prediabetes    Other fatigue    Paroxysmal atrial fibrillation    PVC's (premature ventricular contractions)    H/O TIA (transient ischemic attack) and stroke    CVA (cerebral vascular accident)    Excessive daytime sleepiness    Impacted cerumen of both ears    Narcolepsy without cataplexy    Acute medial meniscus tear of right knee    First degree AV block    Medicare annual wellness visit, subsequent            PT Neuro       Row Name 23 0845             Subjective Comments    Subjective Comments \"I've had more pain in my L heel and it was starting to up the outside of my L ankle  night and that had me concerned.  -JK         Precautions and Contraindications    Precautions/Limitations fall precautions  -JK         Subjective Pain    Able to rate subjective pain? yes  -JK      Pre-Treatment Pain Level 0  -JK      Post-Treatment Pain Level 0  -JK         Cognition    Overall Cognitive Status WFL  -JK         Posture/Observations    Posture/Observations Comments Pt arrived to PT without AD. Shoulders appear level. Pt has new orthotics in shoes  -JK         Bed Mobility    Bed Mobility supine-sit;sit-supine  -JK      Supine-Sit " "[place] (Bed Mobility) modified independence  -JK      Sit-Supine [place] (Bed Mobility) modified independence  -JK         Transfers    Sit-Stand [place] (Transfers) independent  -JK      Stand-Sit [place] (Transfers) independent  -JK         Gait/Stairs (Locomotion)    [place] Level (Gait) independent  -JK      Distance in Feet (Gait) 100' x 2  -JK      Pattern (Gait) step-through  -JK      Deviations/Abnormal Patterns (Gait) base of support, narrow  -JK         Balance Skills Training    Gait Balance Support parallel bars;No upper extremity supported  -JK      Gait Balance Activities --  rockerboard  -JK                User Key  (r) = Recorded By, (t) = Taken By, (c) = Cosigned By      Initials Name Provider Type    Malorie Kerr, PT Physical Therapist                             PT Assessment/Plan       Row Name 06/13/23 1231          PT Assessment    Functional Limitations Impaired gait;Limitations in community activities;Limitation in home management;Performance in work activities;Performance in leisure activities  -JK     Impairments Balance;Gait  -JK     Assessment Comments Pt  tolerated core stability, ankle stability and multi-tasking activities well today.  -JK               User Key  (r) = Recorded By, (t) = Taken By, (c) = Cosigned By      Initials Name Provider Type    Malorie Kerr, PT Physical Therapist                        OP Exercises       Row Name 06/13/23 1232 06/13/23 0858          Subjective Comments    Subjective Comments -- \"I've had more pain in my L heel and it was starting to up the outside of my L ankle sunday night and that had me concerned.  -JK        Subjective Pain    Able to rate subjective pain? -- yes  -JK     Pre-Treatment Pain Level -- 0  -JK     Post-Treatment Pain Level -- 0  -JK        Total Minutes    18358 - PT Therapeutic Exercise Minutes 15  -JK --     78218 -  PT Neuromuscular Reeducation Minutes 30  -JK --        Exercise 1    " Exercise Name 1 -- d1 flexion ext with 3kg med ball standing on blue foam mat  -JK     Cueing 1 -- Verbal;Demo  -JK     Sets 1 -- 2  -JK     Reps 1 -- 10  -JK     Additional Comments -- both with pt holding 4.4# ball and with pt releasing and picking up ball  -JK        Exercise 6    Exercise Name 6 -- Side stepping with resistance band in hands  -JK     Cueing 6 -- Verbal;Demo  -JK     Sets 6 -- 1  -JK     Reps 6 -- 8  -JK     Additional Comments -- GTB and BTB both; able to get about 8 side steps each direction  -JK        Exercise 7    Exercise Name 7 -- Eccentric sit up with 4kg ball in hand  -JK     Cueing 7 -- Verbal;Demo  -JK     Sets 7 -- 2  -JK     Reps 7 -- 8  -JK     Additional Comments -- wedge and 2 pillows behind pt  -JK        Exercise 8    Exercise Name 8 -- Sit up from wedge and 2 pillows  with 4kg ball in hand.  -JK     Cueing 8 -- Verbal;Tactile;Demo  -JK     Sets 8 -- 1  -JK     Reps 8 -- 8  -JK        Exercise 14    Exercise Name 14 -- rockerboard activities  -JK     Cueing 14 -- Tactile;Verbal  -JK     Time 14 -- 4 mins each position  -JK     Additional Comments -- side to side and fwd/back rokck with no UE support; head turns in each position  -JK               User Key  (r) = Recorded By, (t) = Taken By, (c) = Cosigned By      Initials Name Provider Type    Malorie Kerr, PT Physical Therapist                                    Therapy Education  Education Details: talked through how to do reverse sit ups at home to engage lower abs  Given: HEP, Symptoms/condition management, Posture/body mechanics, Fall prevention and home safety, Mobility training  Program: Reinforced, Progressed  How Provided: Verbal, Demonstration  Provided to: Patient  Level of Understanding: Teach back education performed, Verbalized, Demonstrated              Time Calculation:   Start Time: 0845  Stop Time: 0930  Time Calculation (min): 45 min  Total Timed Code Minutes- PT: 45 minute(s)  Timed Charges  56672 - PT  Therapeutic Exercise Minutes: 15  58671 -  PT Neuromuscular Reeducation Minutes: 30  Total Minutes  Timed Charges Total Minutes: 45   Total Minutes: 45   Therapy Charges for Today       Code Description Service Date Service Provider Modifiers Qty    81415385027  PT NEUROMUSC RE EDUCATION EA 15 MIN 6/13/2023 Malorie Seay, PT GP 2    [id number]  PT THER PROC EA 15 MIN 6/13/2023 Malorie Seay, PT GP 1                      Malorie Seay, PT  6/13/2023

## 2023-06-15 ENCOUNTER — HOSPITAL ENCOUNTER (OUTPATIENT)
Dept: PHYSICAL THERAPY | Facility: HOSPITAL | Age: 68
Setting detail: THERAPIES SERIES
Discharge: HOME | End: 2023-06-15
Payer: MEDICARE

## 2023-06-15 DIAGNOSIS — I63.9 CEREBROVASCULAR ACCIDENT (CVA), UNSPECIFIED MECHANISM: ICD-10-CM

## 2023-06-15 DIAGNOSIS — Z74.09 IMPAIRED FUNCTIONAL MOBILITY, BALANCE, GAIT, AND ENDURANCE: Primary | ICD-10-CM

## 2023-06-15 PROCEDURE — 97112 NEUROMUSCULAR REEDUCATION: CPT | Performed by: PHYSICAL THERAPIST

## 2023-06-15 NOTE — THERAPY PROGRESS REPORT/RE-CERT
Outpatient Physical Therapy Neuro Progress Note  [facility]     Patient Name: Jose Ramon Doll  : 1955  MRN: [id number]  Today's Date: 6/15/2023      Visit Date: 06/15/2023    Visit Dx:    ICD-10-CM ICD-9-CM   1. Impaired functional mobility, balance, gait, and endurance  Z74.09 V49.89   2. Cerebrovascular accident (CVA), unspecified mechanism  I63.9 434.91       Patient Active Problem List   Diagnosis    Cervical spinal stenosis    ED (erectile dysfunction) of non-organic origin    Hypertension    Lumbar radiculopathy    Neoplasm of skin    Obstructive sleep apnea    Neck pain    Benign non-nodular prostatic hyperplasia with lower urinary tract symptoms    Shoulder pain, acute    Urinary frequency    Chronic pain of right knee    Rotator cuff tendonitis    Prediabetes    Other fatigue    Paroxysmal atrial fibrillation    PVC's (premature ventricular contractions)    H/O TIA (transient ischemic attack) and stroke    CVA (cerebral vascular accident)    Excessive daytime sleepiness    Impacted cerumen of both ears    Narcolepsy without cataplexy    Acute medial meniscus tear of right knee    First degree AV block    Medicare annual wellness visit, subsequent            PT Neuro       Row Name 06/15/23 0845             Subjective Comments    Subjective Comments I helped my son move and have a little pain on the top of L foot 7/10 at night (intermittant) but is gone in am.  -JK         Precautions and Contraindications    Precautions/Limitations fall precautions  -JK         Subjective Pain    Able to rate subjective pain? yes  -JK      Pre-Treatment Pain Level 0  -JK      Post-Treatment Pain Level 0  -JK         Cognition    Overall Cognitive Status WFL  -JK         Posture/Observations    Posture/Observations Comments Pt arrived to PT without AD. Shoulders appear level. Pt has new orthotics in shoes  -JK         Transfers    Sit-Stand [place] (Transfers) independent  -JK      Stand-Sit  [place] (Transfers) independent  -JK         Gait/Stairs (Locomotion)    [place] Level (Gait) independent  -JK      Distance in Feet (Gait) 50' x 6  -JK      Pattern (Gait) step-through  -JK      Deviations/Abnormal Patterns (Gait) base of support, narrow  -JK         Balance Skills Training    Standing-Level of Assistance Close supervision;Contact guard  -JK      Static Standing Balance Support No upper extremity supported  -JK      Standing-Balance Activities Tandem Stance;Single Limb Stance;Feet together;Weight Shift R-L;Weight Shift A-P;Semi-tandum;Compliant surfaces  -JK      Gait Balance-Level of Assistance Close supervision;Contact guard  -JK      Gait Balance Support No upper extremity supported  -JK      Gait Balance Activities backwards;tandem;stepping over object;scanning environment R/L  -JK      Balance Comments see JETT DIANE  -JK                User Key  (r) = Recorded By, (t) = Taken By, (c) = Cosigned By      Initials Name Provider Type    Malorie Kerr, PT Physical Therapist                             PT Assessment/Plan       Row Name 06/15/23 1111          PT Assessment    Functional Limitations Impaired gait;Limitations in community activities;Limitation in home management;Performance in work activities;Performance in leisure activities  -JK     Impairments Balance;Gait  -JK     Assessment Comments Pt has attended 9 treatments since evaluaion on 4/27/23. Due to pt being gone a week in May and regular PT being on vacation, pt is due to have goals and progress evaluated. Pt is independent with advanced HEP of balance and strengthening activities. Pt's Diane score improved to 56/56 and DGI to 21/24 today. Pt still has the most challenge with walking and making head turns. Even more challenging is walking, looking up and down, reading a hymnal, singing, then looking up at [employer] while walking down [place] at [employer]. Pt is going to bring his hymnal and reading glasses to simulate this  "task next week. PT plan is to tx pt one or two more times then DC to continue to work independently. Pt agrees with this plan.  -JK               User Key  (r) = Recorded By, (t) = Taken By, (c) = Cosigned By      Initials Name Provider Type    Malorie Kerr, PT Physical Therapist                        OP Exercises       Row Name 06/15/23 1117 06/15/23 0845          Subjective Comments    Subjective Comments -- I helped my son move and have a little pain on the top of L foot 7/10 at night (intermittant) but is gone in am.  -JK        Subjective Pain    Able to rate subjective pain? -- yes  -JK     Pre-Treatment Pain Level -- 0  -JK     Post-Treatment Pain Level -- 0  -JK        Total Minutes    92005 -  PT Neuromuscular Reeducation Minutes 45  -JK --        Exercise 3    Exercise Name 3 -- alt tapping 6\" step  -JK     Cueing 3 -- Verbal  -JK     Sets 3 -- 3  -JK     Reps 3 -- 10  -JK     Additional Comments -- added head turns R/L, up/down  -JK               User Key  (r) = Recorded By, (t) = Taken By, (c) = Cosigned By      Initials Name Provider Type    Malorie Kerr, PT Physical Therapist                                 PT OP Goals       Row Name 06/15/23 0845          PT Short Term Goals    STG Date to Achieve 05/25/23  -JK     STG 1 Pt to be independent with basic HEP of balance activities.  -JK     STG 1 Progress Met  -JK     STG 2 Pt to walk with head turns up/down and side to side with less lateral lean to the L.  -JK     STG 2 Progress Met  -JK     STG 3 Pt to perform alternate tapping on 6\" step safely x 10 reps each LE with no UE support and no LOB.  -JK     STG 3 Progress Met  -JK     STG 4 Pt to complete balance tasks on compliant surfaces to improve gait and balance outside on grass.  -JK     STG 4 Progress Met  -JK        Long Term Goals    LTG Date to Achieve 06/22/23  -JK     LTG 1 Pt to be independent with advanced HEP of balance activities.  -JK     LTG 1 Progress Met;Ongoing  -JK     " LTG 2 Pt to improve balance with improved DGI score of 21/24 or better.  -JK     LTG 2 Progress Met  -JK     LTG 3 Pt to improve DIANE score to 52/56.  -JK     LTG 3 Progress Met  -JK     LTG 4 Pt to walk outside on his grass doing outdoor tasks with less reports of LOB or near LOB  -JK     LTG 4 Progress Met  -JK     LTG 5 Pt to walk and look up/down and read hymnal and sing with minimal trunk sway.  -JK     LTG 5 Progress Ongoing;New  -JK        Time Calculation    PT Goal Re-Cert Due Date 06/15/23  -JK               User Key  (r) = Recorded By, (t) = Taken By, (c) = Cosigned By      Initials Name Provider Type    Malorie Kerr, PT Physical Therapist                    Therapy Education  Education Details: Discussed progress made and pt's plan to maintain and improve balance after DC  Given: HEP, Symptoms/condition management, Posture/body mechanics, Fall prevention and home safety, Mobility training  Program: Reinforced, Progressed  How Provided: Verbal, Demonstration  Provided to: Patient  Level of Understanding: Teach back education performed, Verbalized, Demonstrated    Diane Balance Scale  Sitting to Standing: able to stand without using hands and stabilize independently  Standing Unsupported: able to stand safely for 2 minutes  Sitting with Back Unsupported but Feet Supported on Floor or on Stool: able to sit safely and securely for 2 minutes  Standing to Sitting: sits safely with minimal use of hands  Transfers: able to transfer safely with minor use of hands  Standing Unsupported with Eyes Closed: able to stand 10 seconds safely  Standing Unsupported with Feet Together: able to place feet together independently and stand 1 minute safely  Reaching Forward with Outstretched Arm While Standing: can reach forward confidently 25 cm (10 inches)   Object From the Floor From a Standing Position: able to  object safely and easily  Turning to Look Behind Over Left and Right Shoulders While Standing:  looks behind from both sides and weight shifts well  Turn 360 Degrees: able to turn 360 degrees safely in 4 seconds or less  Place Alternate Foot on Step or Stool While Standing Unsupported: able to stand independently and safely and complete 8 steps in 20 seconds  Standing Unsupported with One Foot in Front: able to place foot tandem independently and hold 30 seconds  Standing on One Leg: able to lift leg independently and hold > 10 seconds  [place] Total Score: 56  Dynamic Gait Index (DGI)  Gait Level Surface: Normal: walks 20’, no assistive devices, good speed, no evidence for imbalance, normal gait pattern  Change in Gait Speed: Normal: Able to smoothly change walking speed without loss of balance or gait deviation. Shows significant difference in walking speeds between normal, fast and slow paces.  Gait with Horizontal Head Turns: Mild impairment: Performs head turns smoothly with slight change in gait velocity, i.e. minor disruption to smooth gait path or uses walking aid.  Gait with Vertical Head Turns: Mild impairment: Performs head turns smoothly with slight change in gait velocity, i.e. minor disruption to smooth gait path or uses walking aid.  Gait and Pivot Turn: Mild impairment: pivot turns safely in >3 seconds and stops with no loss of balance.  Step Over Obstacle: Normal: Is able to step over box without changing gait speed, no evidence for imbalance.  Step Around Obstacles: Normal: Is able to walk safely around cones safely without changing gait speed, no evidence of imbalance.  Steps: Normal: Alternating feet, no rail.  Dynamic Gait Index Score: 21      Time Calculation:   Start Time: 0845  Stop Time: 0930  Time Calculation (min): 45 min  Total Timed Code Minutes- PT: 45 minute(s)  Timed Charges  31253 -  PT Neuromuscular Reeducation Minutes: 45  Total Minutes  Timed Charges Total Minutes: 45   Total Minutes: 45   Therapy Charges for Today       Code Description Service Date Service Provider Modifiers  Qty    [id number]  PT NEUROMUSC RE EDUCATION EA 15 MIN 6/15/2023 Malorie Seay, PT GP 3            PT G-Codes  [place] Total Score: 56         Malorie Seay, PT  6/15/2023

## 2023-07-19 ENCOUNTER — TELEPHONE (OUTPATIENT)
Dept: FAMILY MEDICINE CLINIC | Facility: CLINIC | Age: 68
End: 2023-07-19

## 2023-07-19 NOTE — TELEPHONE ENCOUNTER
Caller: Jose Ramon Doll    Relationship to patient: Self    Best call back number: [phone]    Patient is needing: PATIENT STATED THAT HE GOT A CALL LAST WEEK FROM THE OFFICE STATING THEY WANTED TO MOVIE HIS APPOINTMENT SOONER IF HE WAS GETTING SURGERY. PATIENT STATED THAT HE MET WITH AN ORTHOPEDIC SURGEON THIS MORNING 7/19/23 AND HE WAS TOLD THEY WOULD NOT DISCUSS SURGERY THIS MOMENT SO NO SURGERY IS PLANNED .

## 2023-08-15 ENCOUNTER — TELEPHONE (OUTPATIENT)
Dept: FAMILY MEDICINE CLINIC | Facility: CLINIC | Age: 68
End: 2023-08-15

## 2023-08-15 NOTE — TELEPHONE ENCOUNTER
Hub staff attempted to follow warm transfer process and was unsuccessful     Caller: Jose Ramon Doll    Relationship to patient: Self    Best call back number: [phone]     Patient is needing: LAB APPOINTMENT RESCHEDULE

## 2023-09-06 ENCOUNTER — OFFICE VISIT (OUTPATIENT)
Dept: CARDIOLOGY | Facility: CLINIC | Age: 68
End: 2023-09-06
Payer: MEDICARE

## 2023-09-06 VITALS
DIASTOLIC BLOOD PRESSURE: 76 MMHG | BODY MASS INDEX: 38.33 KG/M2 | WEIGHT: 283 LBS | SYSTOLIC BLOOD PRESSURE: 118 MMHG | HEART RATE: 78 BPM | HEIGHT: 72 IN

## 2023-09-06 DIAGNOSIS — I48.0 PAROXYSMAL ATRIAL FIBRILLATION: Primary | ICD-10-CM

## 2023-09-06 DIAGNOSIS — I10 PRIMARY HYPERTENSION: ICD-10-CM

## 2023-09-06 DIAGNOSIS — I49.3 PVC'S (PREMATURE VENTRICULAR CONTRACTIONS): ICD-10-CM

## 2023-09-06 PROCEDURE — 1160F RVW MEDS BY RX/DR IN RCRD: CPT | Performed by: INTERNAL MEDICINE

## 2023-09-06 PROCEDURE — 99214 OFFICE O/P EST MOD 30 MIN: CPT | Performed by: INTERNAL MEDICINE

## 2023-09-06 PROCEDURE — 3074F SYST BP LT 130 MM HG: CPT | Performed by: INTERNAL MEDICINE

## 2023-09-06 PROCEDURE — 1159F MED LIST DOCD IN RCRD: CPT | Performed by: INTERNAL MEDICINE

## 2023-09-06 PROCEDURE — 93000 ELECTROCARDIOGRAM COMPLETE: CPT | Performed by: INTERNAL MEDICINE

## 2023-09-06 PROCEDURE — 3078F DIAST BP <80 MM HG: CPT | Performed by: INTERNAL MEDICINE

## 2023-09-06 NOTE — PROGRESS NOTES
Date of Office Visit: 2023  Encounter Provider: Daria Daniels MD  Place of Service: [facility] CARDIOLOGY  Patient Name: Jose Ramon Doll  :1955      Patient ID:  Jose Ramon Doll is a 68 y.o. male is here for  followup for PAF and PVCs        History of Present Illness    He has history of PVCs with bigeminy, PAF, hypertension GERD, renal calculi, DORA on BiPAP, narcolepsy, history of stroke in the past.     He is  and has 2 biologic and 2 stepsons.  He is [employer] .  Is never smoked, he has 2 beers twice a week and 1 cup of coffee daily.  Uses no drugs.  His father had atrial fibrillation fibrillation.  His mother and father both had diabetes and hypertension.  His father had breast cancer.     2020, he suffered a right ventral catie stroke and went to [facility].  There he had an echocardiogram which showed no shunt, mild left ventricular hypertrophy, aorta measuring 3.8 cm and ejection fraction of 59%.  He had 24-hour Holter which showed sinus rhythm and PVCs.  The stroke resulted in some left foot weakness and balance issues as well as slurred speech which is gotten much better and he is doing occupational therapy.  They thought the stroke occurred because of hypertension.  He had a CT of the head neck which showed no significant carotid disease.  He has had fatigue which has been pretty significant since the stroke but yesterday was particularly bad.  He got home from occupational therapy and just was very weak and fatigued and took 1/2-hour an hour nap which is not typical for him.  He then got up to eat dinner with his wife's parents and felt so fatigued he had to sit back down.  He then checked his pulse and it was in the 40s so he went to the emergency department.    It was due to PVCs.     He wore a 2 week monitor done 3/22/21 showing PAF and he was started on eliquis 5mg bid.       Labs on 2023 showed glucose  120, otherwise normal CMP, total cholesterol 116, HDL 46, LDL 51, triglycerides 100, VLDL 19, normal CBC.  Echo done 6/8/2023 shows ejection fraction 64% with mild concentric left ventricle hypertrophy, normal diastolic function, normal valvular structure and function.  11-day Zio patch monitor done 6/12/2023 showed sinus rhythm with rare PACs and rare PVCs.  There was 1 episode of ventricular tachycardia at 110 bpm without symptoms.    He has no chest pain or pressure.  He has mild exertional dyspnea-chronic, he believes due to deconditioning and weight gain.  He does not feel his heart racing or skipping.  His energy level stable.  He has no orthopnea or PND.  He is not exercising but plans to start-if he will spur and has been in physical therapy for this.    Past Medical History:   Diagnosis Date    Abnormal EKG     IN PAST    Acute medial meniscus tear of right knee 1/28/2019    Added automatically from request for surgery [id number]  Formatting of this note might be different from the original. Added automatically from request for surgery [id number]  Formatting of this note might be different from the original. Added automatically from request for surgery [id number]    Allergic     Arthritis     ED (erectile dysfunction)     First degree AV block     GERD (gastroesophageal reflux disease)     Glaucoma     Hypertension     IN PAST  NO MEDS    Kidney stone     Narcolepsy     Paroxysmal atrial fibrillation     PONV (postoperative nausea and vomiting)     1969 AND 1998    PVCs (premature ventricular contractions)     Sleep apnea     Urinary tract infection          Past Surgical History:   Procedure Laterality Date    ANAL FISTULOTOMY      APPENDECTOMY      BACK SURGERY      CERVICAL    CATARACT EXTRACTION Bilateral     COLONOSCOPY      KNEE ARTHROSCOPY Right 02/12/2019    Procedure: Knee Arthroscopy with PARTICIAL MEDIAL Menisectomy;  Surgeon: Dwaine Connolly MD;  Location: [facility] OR [facility] [place];  Service: Orthopedics    NECK  SURGERY      WISDOM TOOTH EXTRACTION         Current Outpatient Medications on File Prior to Visit   Medication Sig Dispense Refill    azelastine (ASTELIN) 0.1 % nasal spray 2 sprays into the nostril(s) as directed by provider 2 (Two) Times a Day. Use in each nostril as directed 90 mL 3    fluticasone (FLONASE) 50 MCG/ACT nasal spray 2 sprays into the nostril(s) as directed by provider Daily. 18.2 mL 3    lisdexamfetamine (VYVANSE) 40 MG capsule Take 1 capsule by mouth Daily 30 capsule 1    metoprolol succinate XL (TOPROL-XL) 25 MG 24 hr tablet Take 1 tablet by mouth 2 (Two) Times a Day. 180 tablet 3    rivaroxaban (Xarelto) 20 MG tablet Take 1 tablet by mouth Daily With Dinner. 90 tablet 3    rosuvastatin (CRESTOR) 20 MG tablet Take 1 tablet by mouth every night at bedtime for 90 days. 90 tablet 3    timolol (TIMOPTIC) 0.5 % ophthalmic solution Apply  to eye(s) as directed by provider Every 12 (Twelve) Hours.      valsartan (DIOVAN) 160 MG tablet Take 1 tablet by mouth 2 (Two) Times a Day. 180 tablet 2    [DISCONTINUED] brimonidine-timolol (COMBIGAN) 0.2-0.5 % ophthalmic solution 1 drop Every 12 (Twelve) Hours.      [DISCONTINUED] methylphenidate (RITALIN) 20 MG tablet        No current facility-administered medications on file prior to visit.       Social History     Socioeconomic History    Marital status:    Tobacco Use    Smoking status: Never     Passive exposure: Never    Smokeless tobacco: Former     Types: Chew     Quit date: 2020    Tobacco comments:     Caffeine use: daily   Vaping Use    Vaping Use: Never used   Substance and Sexual Activity    Alcohol use: Yes     Alcohol/week: 0.0 - 2.0 standard drinks    Drug use: Never    Sexual activity: Yes     Partners: Female     Birth control/protection: Hysterectomy           ROS    Procedures    ECG 12 Lead    Date/Time: 9/6/2023 1:36 PM  Performed by: Daria Daniels MD  Authorized by: Daria Daniels MD   Comparison: compared with previous  "ECG   Similar to previous ECG  Rhythm: sinus rhythm    Clinical impression: normal ECG            Objective:      Vitals:    09/06/23 1328   BP: 118/76   Pulse: 78   Weight: 128 kg (283 lb)   Height: 182.9 cm (72\")     Body mass index is 38.38 kg/m².    Vitals reviewed.   Constitutional:       General: Not in acute distress.     Appearance: Well-developed. Not diaphoretic.   Eyes:      General: No scleral icterus.     Conjunctiva/sclera: Conjunctivae normal.   HENT:      Head: Normocephalic and atraumatic.   Neck:      Thyroid: No thyromegaly.      Vascular: No carotid bruit or JVD.      Lymphadenopathy: No cervical adenopathy.   Pulmonary:      Effort: Pulmonary effort is normal. No respiratory distress.      Breath sounds: Normal breath sounds. No wheezing. No rhonchi. No rales.   Chest:      Chest wall: Not tender to palpatation.   Cardiovascular:      Normal rate. Regular rhythm.      Murmurs: There is no murmur.      No gallop.  No rub.   Pulses:     Intact distal pulses.      Carotid: 2+ bilaterally.     Radial: 2+ bilaterally.     Dorsalis pedis: 2+ bilaterally.     Posterior tibial: 2+ bilaterally.  Edema:     Peripheral edema absent.   Abdominal:      General: Bowel sounds are normal. There is no distension or abdominal bruit.      Palpations: Abdomen is soft. There is no abdominal mass.      Tenderness: There is no abdominal tenderness.   Musculoskeletal:         General: No deformity.      Extremities: No clubbing present.     Cervical back: Neck supple. Skin:     General: Skin is warm and dry. There is no cyanosis.      Coloration: Skin is not pale.      Findings: No rash.   Neurological:      Mental Status: Alert and oriented to person, place, and time.      Cranial Nerves: No cranial nerve deficit.   Psychiatric:         Judgment: Judgment normal.       Lab Review:       Assessment:      Diagnosis Plan   1. Paroxysmal atrial fibrillation        2. PVC's (premature ventricular contractions)        3. " Primary hypertension          H/o Frequent PVCs in a bigeminal pattern - no PvCs today.  Continue metoprolol but increase it to 25 mg twice daily.  Fatigue, possibly due to post stroke and PVCs.   DORA on BiPAP  History of stroke November 2020  Obesity  Hypertension, controlled, continue valsartan and metoprolol  Hyperlipidemia, continue rosuvastatin.  PAF, on Xarelto 20 mg daily.     Plan:       See Cherry in 1 year, no medication changes, advised exercise and weight loss, no other testing at this time.

## 2023-09-26 ENCOUNTER — OFFICE VISIT (OUTPATIENT)
Dept: FAMILY MEDICINE CLINIC | Facility: CLINIC | Age: 68
End: 2023-09-26
Payer: MEDICARE

## 2023-09-26 VITALS
RESPIRATION RATE: 16 BRPM | HEART RATE: 77 BPM | HEIGHT: 72 IN | DIASTOLIC BLOOD PRESSURE: 74 MMHG | OXYGEN SATURATION: 94 % | WEIGHT: 283 LBS | SYSTOLIC BLOOD PRESSURE: 136 MMHG | BODY MASS INDEX: 38.33 KG/M2 | TEMPERATURE: 98.6 F

## 2023-09-26 DIAGNOSIS — H65.111 ACUTE MUCOID OTITIS MEDIA OF RIGHT EAR: Primary | ICD-10-CM

## 2023-09-26 DIAGNOSIS — R05.9 COUGH, UNSPECIFIED TYPE: ICD-10-CM

## 2023-09-26 DIAGNOSIS — J01.10 ACUTE FRONTAL SINUSITIS, RECURRENCE NOT SPECIFIED: ICD-10-CM

## 2023-09-26 PROBLEM — H25.9 AGE-RELATED CATARACT OF LEFT EYE: Status: ACTIVE | Noted: 2022-08-05

## 2023-09-26 LAB
EXPIRATION DATE: NORMAL
FLUAV AG UPPER RESP QL IA.RAPID: NOT DETECTED
FLUBV AG UPPER RESP QL IA.RAPID: NOT DETECTED
INTERNAL CONTROL: NORMAL
Lab: NORMAL
SARS-COV-2 AG UPPER RESP QL IA.RAPID: NOT DETECTED

## 2023-09-26 PROCEDURE — 3078F DIAST BP <80 MM HG: CPT | Performed by: NURSE PRACTITIONER

## 2023-09-26 PROCEDURE — 1160F RVW MEDS BY RX/DR IN RCRD: CPT | Performed by: NURSE PRACTITIONER

## 2023-09-26 PROCEDURE — 1159F MED LIST DOCD IN RCRD: CPT | Performed by: NURSE PRACTITIONER

## 2023-09-26 PROCEDURE — 87428 SARSCOV & INF VIR A&B AG IA: CPT | Performed by: NURSE PRACTITIONER

## 2023-09-26 PROCEDURE — 99213 OFFICE O/P EST LOW 20 MIN: CPT | Performed by: NURSE PRACTITIONER

## 2023-09-26 PROCEDURE — 3075F SYST BP GE 130 - 139MM HG: CPT | Performed by: NURSE PRACTITIONER

## 2023-09-26 RX ORDER — AMOXICILLIN AND CLAVULANATE POTASSIUM 875; 125 MG/1; MG/1
1 TABLET, FILM COATED ORAL 2 TIMES DAILY
Qty: 20 TABLET | Refills: 0 | Status: SHIPPED | OUTPATIENT
Start: 2023-09-26 | End: 2023-10-06

## 2023-09-26 RX ORDER — METHYLPREDNISOLONE 4 MG/1
TABLET ORAL
Qty: 1 EACH | Refills: 0 | Status: SHIPPED | OUTPATIENT
Start: 2023-09-26

## 2023-09-26 RX ORDER — BENZONATATE 100 MG/1
100 CAPSULE ORAL 3 TIMES DAILY PRN
Qty: 30 CAPSULE | Refills: 0 | Status: SHIPPED | OUTPATIENT
Start: 2023-09-26

## 2023-09-26 NOTE — PROGRESS NOTES
Date of Office Visit: 2021  Encounter Provider: Daria Daniels MD  Place of Service: [facility] CARDIOLOGY  Patient Name: Jose Ramon Murcia  :1955      Patient ID:  Jose Ramon Murcia is a 65 y.o. male is here for low heart rate.          History of Present Illness    He is here for generalized weakness.  He was in the emergency department 2021 and discharged on 2021 with weakness.  He was discharged at 6 AM His laboratory values showed negative troponin, glucose 144, otherwise normal CMP, normal lactate and procalcitonin, normal CBC.  His ECG showed normal sinus rhythm with frequent premature ventricular complexes in a bigeminal pattern.  He had normal TSH 2020.    He has a history of hypertension, GERD, renal calculi, struct of sleep apnea on BiPAP, history of stroke prior to .    He is  and has 2 biologic and 2 stepsons.  He is [employer] .  Is never smoked, he has 2 beers twice a week and 1 cup of coffee daily.  Uses no drugs.  His father had atrial fibrillation fibrillation.  His mother and father both had diabetes and hypertension.  His father had breast cancer.    2020, he suffered a right ventral catie stroke and went to [facility].  There he had an echocardiogram which showed no shunt, mild left ventricular hypertrophy, aorta measuring 3.8 cm and ejection fraction of 59%.  He had 24-hour Holter which showed sinus rhythm and PVCs.  The stroke resulted in some left foot weakness and balance issues as well as slurred speech which is gotten much better and he is doing occupational therapy.  They thought the stroke occurred because of hypertension.  He had a CT of the head neck which showed no significant carotid disease.  He has had fatigue which has been pretty significant since the stroke but yesterday was particularly bad.  He got home from occupational therapy and just was very weak and fatigued  and took 1/2-hour an hour nap which is not typical for him.  He then got up to eat dinner with his wife's parents and felt so fatigued he had to sit back down.  He then checked his pulse and it was in the 40s so he went to the emergency department.  His blood pressure was 120/70.  He has had no syncope.  He does not feel his heart racing skipping or pounding.  He has no exertional chest tightness or pressure and he has no orthopnea or PND.    Past Medical History:   Diagnosis Date   • Abnormal EKG     IN PAST   • Allergic    • Arthritis    • ED (erectile dysfunction)    • GERD (gastroesophageal reflux disease)    • Hypertension     IN PAST  NO MEDS   • Kidney stone    • Narcolepsy    • PONV (postoperative nausea and vomiting)     1969 AND 1998   • Sleep apnea          Past Surgical History:   Procedure Laterality Date   • ANAL FISTULOTOMY     • APPENDECTOMY     • BACK SURGERY      CERVICAL   • COLONOSCOPY     • KNEE ARTHROSCOPY Right 2/12/2019    Procedure: Knee Arthroscopy with PARTICIAL MEDIAL Menisectomy;  Surgeon: Dwaine Connolly MD;  [place] [facility] [place] [facility] [place]  Service: Orthopedics   • NECK SURGERY     • WISDOM TOOTH EXTRACTION         Current Outpatient Medications on File Prior to Visit   Medication Sig Dispense Refill   • amphetamine-dextroamphetamine (ADDERALL) 20 MG tablet Take 20 mg by mouth Daily. Take 2 tablets by mouth every morning and 1 tablet by mouth at noon     • aspirin 81 MG chewable tablet Chew 81 mg Daily.     • atorvastatin (LIPITOR) 80 MG tablet Take 1 tablet by mouth Daily. 90 tablet 1   • azelastine (ASTELIN) 0.1 % nasal spray SPRAY ONCE IN EACH NOSTRIL TWICE DAILY 30 mL 1   • brimonidine-timolol (COMBIGAN) 0.2-0.5 % ophthalmic solution 1 drop Every 12 (Twelve) Hours.     • fluticasone (FLONASE) 50 MCG/ACT nasal spray 2 sprays into the nostril(s) as directed by provider Every Night.     • Pitolisant HCl (Wakix) 17.8 MG tablet Take 17.8 mg by mouth. Take 2 tablets every morning.     •  valsartan (DIOVAN) 160 MG tablet Take 160 mg by mouth Daily.     • [DISCONTINUED] amoxicillin (AMOXIL) 500 MG capsule Take 1,000 mg by mouth 3 (Three) Times a Day.       Current Facility-Administered Medications on File Prior to Visit   Medication Dose Route Frequency Provider Last Rate Last Admin   • [DISCONTINUED] sodium chloride 0.9 % flush 10 mL  10 mL Intravenous PRN Trenton Lambert III, PA   10 mL at 02/24/21 6236       Social History     Socioeconomic History   • Marital status:      Spouse name: Not on file   • Number of children: Not on file   • Years of education: Not on file   • Highest education level: Not on file   Tobacco Use   • Smoking status: Never Smoker   • Smokeless tobacco: Never Used   Substance and Sexual Activity   • Alcohol use: Yes     Frequency: Never     Comment: social//Caffeine use: 1 cup daily    • Drug use: No   • Sexual activity: Defer           Review of Systems   Constitution: Negative.   HENT: Negative for congestion.    Eyes: Negative for vision loss in left eye and vision loss in right eye.   Respiratory: Negative.  Negative for cough, hemoptysis, shortness of breath, sleep disturbances due to breathing, snoring, sputum production and wheezing.    Endocrine: Negative.    Hematologic/Lymphatic: Negative.    Skin: Negative for poor wound healing and rash.   Musculoskeletal: Negative for falls, gout, muscle cramps and myalgias.   Gastrointestinal: Negative for abdominal pain, diarrhea, dysphagia, hematemesis, melena, nausea and vomiting.   Neurological: Negative for excessive daytime sleepiness, dizziness, headaches, light-headedness, loss of balance, seizures and vertigo.   Psychiatric/Behavioral: Negative for depression and substance abuse. The patient is not nervous/anxious.        Procedures    ECG 12 Lead    Date/Time: 2/25/2021 2:46 PM  Performed by: Daria Daniels MD  Authorized by: Daria Daniels MD   Comparison: compared with previous ECG  "  Comparison to previous ECG: Now pvcs   Rhythm: sinus rhythm    Clinical impression: non-specific ECG                Objective:      Vitals:    02/25/21 1423 02/25/21 1424   BP: 130/70 124/70   BP Location: Left arm Right arm   Pulse: 69    Weight: 127 kg (279 lb 6.4 oz)    Height: 182.9 cm (72\")      Body mass index is 37.89 kg/m².    Vitals signs reviewed.   Constitutional:       General: Not in acute distress.     Appearance: Well-developed. Not diaphoretic.   Eyes:      General: No scleral icterus.     Conjunctiva/sclera: Conjunctivae normal.   HENT:      Head: Normocephalic and atraumatic.   Neck:      Musculoskeletal: Neck supple.      Thyroid: No thyromegaly.      Vascular: No carotid bruit or JVD.      Lymphadenopathy: No cervical adenopathy.   Pulmonary:      Effort: Pulmonary effort is normal. No respiratory distress.      Breath sounds: Normal breath sounds. No wheezing. No rhonchi. No rales.   Chest:      Chest wall: Not tender to palpatation.   Cardiovascular:      Normal rate. Regular rhythm.      Murmurs: There is no murmur.      No gallop.   Pulses:     Intact distal pulses.   Edema:     Peripheral edema absent.   Abdominal:      General: Bowel sounds are normal. There is no distension or abdominal bruit.      Palpations: Abdomen is soft. There is no abdominal mass.      Tenderness: There is no abdominal tenderness.   Musculoskeletal:         General: No deformity.      Extremities: No clubbing present.  Skin:     General: Skin is warm and dry. There is no cyanosis.      Coloration: Skin is not pale.      Findings: No rash.   Neurological:      Mental Status: Alert and oriented to person, place, and time.      Cranial Nerves: No cranial nerve deficit.   Psychiatric:         Judgment: Judgment normal.         Lab Review:       Assessment:      Diagnosis Plan   1. Essential hypertension     2. Prediabetes     3. PVC's (premature ventricular contractions)  Holter Monitor - 72 Hour Up To 15 Days   4. " Palpitations  Holter Monitor - 72 Hour Up To 15 Days   5. H/O: CVA (cerebrovascular accident)  Holter Monitor - 72 Hour Up To 15 Days     1. Frequent PVCs in a bigeminal pattern.  This will cause his pulse to feel exactly half of what his heart rate is.  I think that is what was happening yesterday.  He does not have significant bradycardia here but will put a monitor on looking for the burden of PVCs as well as atrial fibrillation.  I am not sure why is having the PVCs but will treat these with Toprol-XL 25 mg nightly.  2. Fatigue, possibly due to post stroke and PVCs.   3. DORA on BiPAP  4. History of stroke November 2020  5. Obesity  6. Hypertension     Plan:       See back in 3 months with Orly, set up 2-week monitor for PVCs and looking for paroxysmal atrial fibrillation.   Hemostasis: Electrodesiccation

## 2023-09-26 NOTE — PROGRESS NOTES
Subjective     Jose Ramon Doll is a 68 y.o.. male.     URI   This is a new problem. Episode onset: 3 days. The problem has been unchanged. There has been no fever. Associated symptoms include congestion, coughing, headaches, rhinorrhea, sinus pain, sneezing and a sore throat (at beginning, none now). Pertinent negatives include no diarrhea, ear pain, nausea, plugged ear sensation, rash or vomiting. Treatments tried: mucinex, cough syrup. The treatment provided no relief.     The following portions of the patient's history were reviewed and updated as appropriate: allergies, current medications, past family history, past medical history, past social history, past surgical history and problem list.    Past Medical History:   Diagnosis Date    Abnormal EKG     IN PAST    Acute medial meniscus tear of right knee 1/28/2019    Added automatically from request for surgery [id number]  Formatting of this note might be different from the original. Added automatically from request for surgery [id number]  Formatting of this note might be different from the original. Added automatically from request for surgery [id number]    Allergic     Arthritis     ED (erectile dysfunction)     First degree AV block     GERD (gastroesophageal reflux disease)     Glaucoma     Hypertension     IN PAST  NO MEDS    Kidney stone     Narcolepsy     Paroxysmal atrial fibrillation     PONV (postoperative nausea and vomiting)     1969 AND 1998    PVCs (premature ventricular contractions)     Sleep apnea     Urinary tract infection        Past Surgical History:   Procedure Laterality Date    ANAL FISTULOTOMY      APPENDECTOMY      BACK SURGERY      CERVICAL    CATARACT EXTRACTION Bilateral     COLONOSCOPY      KNEE ARTHROSCOPY Right 02/12/2019    Procedure: Knee Arthroscopy with PARTICIAL MEDIAL Menisectomy;  Surgeon: Dwaine Connolly MD;  Location: [facility] OR [facility] [place];  Service: Orthopedics    NECK SURGERY      WISDOM TOOTH EXTRACTION         Review of  "Systems   Constitutional:  Negative for fever.   HENT:  Positive for congestion, postnasal drip, rhinorrhea, sinus pain, sneezing and sore throat (at beginning, none now). Negative for ear pain.    Eyes:         Pressure behind eyes   Respiratory:  Positive for cough and shortness of breath (with cough).    Gastrointestinal:  Negative for diarrhea, nausea and vomiting.   Musculoskeletal:  Positive for arthralgias.   Skin:  Negative for rash.   Neurological:  Positive for headaches.     Allergies   Allergen Reactions    Clarithromycin Other (See Comments)     Unable to eat     Sulfa Antibiotics Myalgia       Objective     Vitals:    09/26/23 0849   BP: 136/74   Pulse: 77   Resp: 16   Temp: 98.6 °F (37 °C)   SpO2: 94%   Weight: 128 kg (283 lb)   Height: 182.9 cm (72.01\")     Body mass index is 38.37 kg/m².    Physical Exam  Vitals reviewed.   Constitutional:       Appearance: He is well-developed.   HENT:      Head: Normocephalic and atraumatic.      Right Ear: A middle ear effusion (clear, beginning haziness) is present. Tympanic membrane is erythematous.      Left Ear: A middle ear effusion (clear fluid) is present. Tympanic membrane is not erythematous.      Nose: Congestion present.      Mouth/Throat:      Mouth: Mucous membranes are moist.      Pharynx: Oropharyngeal exudate (clear pnd) present. No pharyngeal swelling or posterior oropharyngeal erythema.   Eyes:      Conjunctiva/sclera: Conjunctivae normal.      Pupils: Pupils are equal, round, and reactive to light.   Cardiovascular:      Rate and Rhythm: Normal rate and regular rhythm.      Heart sounds: No murmur heard.  Pulmonary:      Effort: Pulmonary effort is normal. No accessory muscle usage or respiratory distress.      Breath sounds: Examination of the right-upper field reveals decreased breath sounds and rhonchi. Examination of the left-upper field reveals decreased breath sounds and rhonchi. Decreased breath sounds and rhonchi present. No wheezing or " rales.   Musculoskeletal:         General: Normal range of motion.   Lymphadenopathy:      Cervical: Cervical adenopathy (shotty) present.   Skin:     General: Skin is warm and dry.   Neurological:      Mental Status: He is alert and oriented to person, place, and time.         Current Outpatient Medications:     azelastine (ASTELIN) 0.1 % nasal spray, 2 sprays into the nostril(s) as directed by provider 2 (Two) Times a Day. Use in each nostril as directed, Disp: 90 mL, Rfl: 3    fluticasone (FLONASE) 50 MCG/ACT nasal spray, 2 sprays into the nostril(s) as directed by provider Daily., Disp: 18.2 mL, Rfl: 3    lisdexamfetamine (VYVANSE) 40 MG capsule, Take 1 capsule by mouth Daily, Disp: 30 capsule, Rfl: 1    metoprolol succinate XL (TOPROL-XL) 25 MG 24 hr tablet, Take 1 tablet by mouth 2 (Two) Times a Day., Disp: 180 tablet, Rfl: 3    rivaroxaban (Xarelto) 20 MG tablet, Take 1 tablet by mouth Daily With Dinner., Disp: 90 tablet, Rfl: 3    timolol (TIMOPTIC) 0.5 % ophthalmic solution, Apply  to eye(s) as directed by provider Every 12 (Twelve) Hours., Disp: , Rfl:     valsartan (DIOVAN) 160 MG tablet, Take 1 tablet by mouth 2 (Two) Times a Day., Disp: 180 tablet, Rfl: 2    amoxicillin-clavulanate (AUGMENTIN) 875-125 MG per tablet, Take 1 tablet by mouth 2 (Two) Times a Day for 10 days., Disp: 20 tablet, Rfl: 0    benzonatate (Tessalon Perles) 100 MG capsule, Take 1 capsule by mouth 3 (Three) Times a Day As Needed for Cough., Disp: 30 capsule, Rfl: 0    methylPREDNISolone (MEDROL) 4 MG dose pack, Take as directed on package instructions., Disp: 1 each, Rfl: 0    rosuvastatin (CRESTOR) 20 MG tablet, Take 1 tablet by mouth every night at bedtime for 90 days., Disp: 90 tablet, Rfl: 3    Recent Results (from the past 2016 hour(s))   POCT SARS-CoV-2 Antigen YAS    Collection Time: 09/26/23  9:08 AM    Specimen: Swab   Result Value Ref Range    SARS Antigen Not Detected Not Detected, Presumptive Negative    Influenza A  Antigen YAS Not Detected Not Detected    Influenza B Antigen YAS Not Detected Not Detected    Internal Control Passed Passed    Lot Number [id number]     Expiration Date 04-              Diagnoses and all orders for this visit:    1. Acute mucoid otitis media of right ear (Primary)  -     amoxicillin-clavulanate (AUGMENTIN) 875-125 MG per tablet; Take 1 tablet by mouth 2 (Two) Times a Day for 10 days.  Dispense: 20 tablet; Refill: 0    2. Acute frontal sinusitis, recurrence not specified  -     methylPREDNISolone (MEDROL) 4 MG dose pack; Take as directed on package instructions.  Dispense: 1 each; Refill: 0    3. Cough, unspecified type  -     POCT SARS-CoV-2 Antigen YAS  -     benzonatate (Tessalon Perles) 100 MG capsule; Take 1 capsule by mouth 3 (Three) Times a Day As Needed for Cough.  Dispense: 30 capsule; Refill: 0        Patient Instructions   Drink plenty of fluids-water preferably, eat a heart healthy diet, get plenty of sleep and do warm salt water gargles twice a day until feeling better.    Return if symptoms worsen or fail to improve, for Dr. Urena as needed/as recommended.

## 2023-10-02 ENCOUNTER — TELEPHONE (OUTPATIENT)
Dept: FAMILY MEDICINE CLINIC | Facility: CLINIC | Age: 68
End: 2023-10-02

## 2023-10-02 NOTE — TELEPHONE ENCOUNTER
Caller: Jose Ramon Doll    Relationship: Self    Best call back number:     What is the best time to reach you:     Who are you requesting to speak with (clinical staff, provider,  specific staff member):     Do you know the name of the person who called:     What was the call regarding: PATIENT IS CALLING IN STATING THAT HE WAS IN LAST WEEK AND PRESCRIBED TESSALON PERLES AND FEELS THEY ARE NOT HELPING.  HE WANTS TO BE CALLED TO DISCUSS ANOTHER MEDICATION THAT CAN BE CALLED IN TO REPLACE THIS.     Is it okay if the provider responds through MyChart:

## 2023-10-05 NOTE — TELEPHONE ENCOUNTER
Hub staff attempted to follow warm transfer process and was unsuccessful     Caller: Jose Ramon Doll    Relationship to patient: Self    Best call back number: [phone]*    Patient is needing: PATIENT CALLING BACK AND STATES THAT NO ONE HAS CALLED HIM BACK REGARDING HIS MESSAGE ON 10/2/23. THE PATIENT IS REQUESTING A CALL BACK.

## 2023-10-06 RX ORDER — GUAIFENESIN AND CODEINE PHOSPHATE 100; 10 MG/5ML; MG/5ML
5 SOLUTION ORAL 3 TIMES DAILY PRN
Qty: 180 ML | Refills: 0 | Status: SHIPPED | OUTPATIENT
Start: 2023-10-06

## 2023-10-13 DIAGNOSIS — I10 PRIMARY HYPERTENSION: Primary | ICD-10-CM

## 2023-10-13 DIAGNOSIS — E78.5 HYPERLIPIDEMIA, UNSPECIFIED HYPERLIPIDEMIA TYPE: ICD-10-CM

## 2023-10-13 DIAGNOSIS — R73.03 PREDIABETES: ICD-10-CM

## 2023-10-13 DIAGNOSIS — Z12.5 SCREENING PSA (PROSTATE SPECIFIC ANTIGEN): ICD-10-CM

## 2023-10-13 DIAGNOSIS — E55.9 VITAMIN D DEFICIENCY: ICD-10-CM

## 2023-10-14 LAB
25(OH)D3+25(OH)D2 SERPL-MCNC: 14.2 NG/ML (ref 30–100)
ALBUMIN SERPL-MCNC: 4.3 G/DL (ref 3.5–5.2)
ALBUMIN/GLOB SERPL: 1.9 G/DL
ALP SERPL-CCNC: 79 U/L (ref 39–117)
ALT SERPL-CCNC: 33 U/L (ref 1–41)
APPEARANCE UR: CLEAR
AST SERPL-CCNC: 20 U/L (ref 1–40)
BACTERIA #/AREA URNS HPF: ABNORMAL /HPF
BASOPHILS # BLD AUTO: 0.03 10*3/MM3 (ref 0–0.2)
BASOPHILS NFR BLD AUTO: 0.5 % (ref 0–1.5)
BILIRUB SERPL-MCNC: 0.5 MG/DL (ref 0–1.2)
BILIRUB UR QL STRIP: NEGATIVE
BUN SERPL-MCNC: 11 MG/DL (ref 8–23)
BUN/CREAT SERPL: 13.8 (ref 7–25)
CALCIUM SERPL-MCNC: 9.1 MG/DL (ref 8.6–10.5)
CASTS URNS MICRO: ABNORMAL
CHLORIDE SERPL-SCNC: 105 MMOL/L (ref 98–107)
CHOLEST SERPL-MCNC: 130 MG/DL (ref 0–200)
CHOLEST/HDLC SERPL: 2.71 {RATIO}
CO2 SERPL-SCNC: 28.2 MMOL/L (ref 22–29)
COLOR UR: YELLOW
CREAT SERPL-MCNC: 0.8 MG/DL (ref 0.76–1.27)
EGFRCR SERPLBLD CKD-EPI 2021: 97.6 ML/MIN/1.73
EOSINOPHIL # BLD AUTO: 0.34 10*3/MM3 (ref 0–0.4)
EOSINOPHIL NFR BLD AUTO: 5.5 % (ref 0.3–6.2)
EPI CELLS #/AREA URNS HPF: ABNORMAL /HPF
ERYTHROCYTE [DISTWIDTH] IN BLOOD BY AUTOMATED COUNT: 13 % (ref 12.3–15.4)
GLOBULIN SER CALC-MCNC: 2.3 GM/DL
GLUCOSE SERPL-MCNC: 125 MG/DL (ref 65–99)
GLUCOSE UR QL STRIP: NEGATIVE
HBA1C MFR BLD: 6.7 % (ref 4.8–5.6)
HCT VFR BLD AUTO: 46.7 % (ref 37.5–51)
HDLC SERPL-MCNC: 48 MG/DL (ref 40–60)
HGB BLD-MCNC: 15.9 G/DL (ref 13–17.7)
HGB UR QL STRIP: NEGATIVE
IMM GRANULOCYTES # BLD AUTO: 0.01 10*3/MM3 (ref 0–0.05)
IMM GRANULOCYTES NFR BLD AUTO: 0.2 % (ref 0–0.5)
KETONES UR QL STRIP: NEGATIVE
LDLC SERPL CALC-MCNC: 62 MG/DL (ref 0–100)
LEUKOCYTE ESTERASE UR QL STRIP: NEGATIVE
LYMPHOCYTES # BLD AUTO: 1.12 10*3/MM3 (ref 0.7–3.1)
LYMPHOCYTES NFR BLD AUTO: 18.1 % (ref 19.6–45.3)
MCH RBC QN AUTO: 29.8 PG (ref 26.6–33)
MCHC RBC AUTO-ENTMCNC: 34 G/DL (ref 31.5–35.7)
MCV RBC AUTO: 87.6 FL (ref 79–97)
MONOCYTES # BLD AUTO: 0.68 10*3/MM3 (ref 0.1–0.9)
MONOCYTES NFR BLD AUTO: 11 % (ref 5–12)
NEUTROPHILS # BLD AUTO: 4.02 10*3/MM3 (ref 1.7–7)
NEUTROPHILS NFR BLD AUTO: 64.7 % (ref 42.7–76)
NITRITE UR QL STRIP: NEGATIVE
NRBC BLD AUTO-RTO: 0 /100 WBC (ref 0–0.2)
PH UR STRIP: 6 [PH] (ref 5–8)
PLATELET # BLD AUTO: 142 10*3/MM3 (ref 140–450)
POTASSIUM SERPL-SCNC: 4.5 MMOL/L (ref 3.5–5.2)
PROT SERPL-MCNC: 6.6 G/DL (ref 6–8.5)
PROT UR QL STRIP: NEGATIVE
PSA SERPL-MCNC: 0.53 NG/ML (ref 0–4)
RBC # BLD AUTO: 5.33 10*6/MM3 (ref 4.14–5.8)
RBC #/AREA URNS HPF: ABNORMAL /HPF
SODIUM SERPL-SCNC: 141 MMOL/L (ref 136–145)
SP GR UR STRIP: 1.02 (ref 1–1.03)
TRIGL SERPL-MCNC: 108 MG/DL (ref 0–150)
UROBILINOGEN UR STRIP-MCNC: NORMAL MG/DL
VLDLC SERPL CALC-MCNC: 20 MG/DL (ref 5–40)
WBC # BLD AUTO: 6.2 10*3/MM3 (ref 3.4–10.8)
WBC #/AREA URNS HPF: ABNORMAL /HPF

## 2023-10-18 ENCOUNTER — OFFICE VISIT (OUTPATIENT)
Dept: FAMILY MEDICINE CLINIC | Facility: CLINIC | Age: 68
End: 2023-10-18
Payer: MEDICARE

## 2023-10-18 VITALS
HEIGHT: 72 IN | RESPIRATION RATE: 16 BRPM | HEART RATE: 78 BPM | WEIGHT: 278.4 LBS | SYSTOLIC BLOOD PRESSURE: 136 MMHG | OXYGEN SATURATION: 95 % | DIASTOLIC BLOOD PRESSURE: 78 MMHG | TEMPERATURE: 98 F | BODY MASS INDEX: 37.71 KG/M2

## 2023-10-18 DIAGNOSIS — N64.9 LESION OF RIGHT NIPPLE: Primary | ICD-10-CM

## 2023-10-18 DIAGNOSIS — R73.03 PREDIABETES: ICD-10-CM

## 2023-10-18 DIAGNOSIS — Z00.00 MEDICARE ANNUAL WELLNESS VISIT, SUBSEQUENT: ICD-10-CM

## 2023-10-18 DIAGNOSIS — I48.0 PAROXYSMAL ATRIAL FIBRILLATION: ICD-10-CM

## 2023-10-18 DIAGNOSIS — G47.419 NARCOLEPSY WITHOUT CATAPLEXY: ICD-10-CM

## 2023-10-18 RX ORDER — FLUOCINONIDE 0.5 MG/G
CREAM TOPICAL EVERY 12 HOURS SCHEDULED
Status: CANCELLED | OUTPATIENT
Start: 2023-10-18

## 2023-10-18 RX ORDER — BETAMETHASONE DIPROPIONATE 0.5 MG/G
LOTION TOPICAL 2 TIMES DAILY
Qty: 60 ML | Refills: 1 | Status: SHIPPED | OUTPATIENT
Start: 2023-10-18

## 2023-10-18 NOTE — PROGRESS NOTES
The ABCs of the Annual Wellness Visit  Subsequent Medicare Wellness Visit    Subjective    Jose Ramon Doll is a 68 y.o. male who presents for a Subsequent Medicare Wellness Visit.    The following portions of the patient's history were reviewed and   updated as appropriate: allergies, current medications, past family history, past medical history, past social history, past surgical history, and problem list.    Compared to one year ago, the patient feels his physical   health is better.    Compared to one year ago, the patient feels his mental   health is better.    Recent Hospitalizations:  He was not admitted to the hospital during the last year.       Current Medical Providers:  Patient Care Team:  Vasyl Urena MD as PCP - General (Internal Medicine)  Ray Pearson MD as Consulting Physician (Pulmonary Disease)  Daria Daniels MD as Cardiologist (Cardiology)    Outpatient Medications Prior to Visit   Medication Sig Dispense Refill    azelastine (ASTELIN) 0.1 % nasal spray 2 sprays into the nostril(s) as directed by provider 2 (Two) Times a Day. Use in each nostril as directed 90 mL 3    fluticasone (FLONASE) 50 MCG/ACT nasal spray 2 sprays into the nostril(s) as directed by provider Daily. 18.2 mL 3    lisdexamfetamine (VYVANSE) 40 MG capsule Take 1 capsule by mouth Daily 30 capsule 1    metoprolol succinate XL (TOPROL-XL) 25 MG 24 hr tablet Take 1 tablet by mouth 2 (Two) Times a Day. 180 tablet 3    rivaroxaban (Xarelto) 20 MG tablet Take 1 tablet by mouth Daily With Dinner. 90 tablet 3    timolol (TIMOPTIC) 0.5 % ophthalmic solution Apply  to eye(s) as directed by provider Every 12 (Twelve) Hours.      valsartan (DIOVAN) 160 MG tablet Take 1 tablet by mouth 2 (Two) Times a Day. 180 tablet 2    rosuvastatin (CRESTOR) 20 MG tablet Take 1 tablet by mouth every night at bedtime for 90 days. 90 tablet 3    benzonatate (Tessalon Perles) 100 MG capsule Take 1 capsule by mouth 3 (Three) Times  a Day As Needed for Cough. (Patient not taking: Reported on 10/18/2023) 30 capsule 0    guaiFENesin-codeine (GUAIFENESIN AC) 100-10 MG/5ML liquid Take 5 mL by mouth 3 (Three) Times a Day As Needed for Cough. (Patient not taking: Reported on 10/18/2023) 180 mL 0    methylPREDNISolone (MEDROL) 4 MG dose pack Take as directed on package instructions. (Patient not taking: Reported on 10/18/2023) 1 each 0     No facility-administered medications prior to visit.       No opioid medication identified on active medication list. I have reviewed chart for other potential  high risk medication/s and harmful drug interactions in the elderly.        Aspirin is not on active medication list.  Aspirin use is not indicated based on review of current medical condition/s. Risk of harm outweighs potential benefits.  .    Patient Active Problem List   Diagnosis    Cervical spinal stenosis    ED (erectile dysfunction) of non-organic origin    Hypertension    Lumbar radiculopathy    Neoplasm of skin    Obstructive sleep apnea    Neck pain    Benign non-nodular prostatic hyperplasia with lower urinary tract symptoms    Shoulder pain, acute    Urinary frequency    Chronic pain of right knee    Rotator cuff tendonitis    Prediabetes    Other fatigue    Paroxysmal atrial fibrillation    PVC's (premature ventricular contractions)    H/O TIA (transient ischemic attack) and stroke    CVA (cerebral vascular accident)    Excessive daytime sleepiness    Impacted cerumen of both ears    Narcolepsy without cataplexy    Acute medial meniscus tear of right knee    First degree AV block    Medicare annual wellness visit, subsequent    Age-related cataract of left eye     Advance Care Planning   Advance Care Planning     Advance Directive is not on file.  ACP discussion was held with the patient during this visit. Patient does not have an advance directive, declines further assistance.     Objective    Vitals:    10/18/23 1506   BP: 136/78   BP  "Location: Right arm   Patient Position: Sitting   Cuff Size: Adult   Pulse: 78   Resp: 16   Temp: 98 °F (36.7 °C)   TempSrc: Oral   SpO2: 95%   Weight: 126 kg (278 lb 6.4 oz)   Height: 182.9 cm (72.01\")     Estimated body mass index is 37.75 kg/m² as calculated from the following:    Height as of this encounter: 182.9 cm (72.01\").    Weight as of this encounter: 126 kg (278 lb 6.4 oz).           Does the patient have evidence of cognitive impairment? No    Lab Results   Component Value Date    CHLPL 130 10/13/2023    TRIG 108 10/13/2023    HDL 48 10/13/2023    LDL 62 10/13/2023    VLDL 20 10/13/2023    HGBA1C 6.70 (H) 10/13/2023        HEALTH RISK ASSESSMENT    Smoking Status:  Social History     Tobacco Use   Smoking Status Never    Passive exposure: Never   Smokeless Tobacco Former    Types: Chew    Quit date: 2020   Tobacco Comments    Caffeine use: daily     Alcohol Consumption:  Social History     Substance and Sexual Activity   Alcohol Use Yes    Alcohol/week: 0.0 - 2.0 standard drinks of alcohol     Fall Risk Screen:    STEADI Fall Risk Assessment was completed, and patient is at MODERATE risk for falls. Assessment completed on:10/18/2023    Depression Screening:      10/18/2023     3:00 PM   PHQ-2/PHQ-9 Depression Screening   Little Interest or Pleasure in Doing Things 0-->not at all   Feeling Down, Depressed or Hopeless 0-->not at all   PHQ-9: Brief Depression Severity Measure Score 0       Health Habits and Functional and Cognitive Screening:      10/18/2023     3:00 PM   Functional & Cognitive Status   Do you have difficulty preparing food and eating? No   Do you have difficulty bathing yourself, getting dressed or grooming yourself? No   Do you have difficulty using the toilet? No   Do you have difficulty moving around from place to place? No   Do you have trouble with steps or getting out of a bed or a chair? No   Current Diet Limited Junk Food   Dental Exam Up to date   Eye Exam Up to date   Exercise " (times per week) 3 times per week   Current Exercises Include Bicycling Outdoors   Do you need help using the phone?  No   Are you deaf or do you have serious difficulty hearing?  No   Do you need help to go to places out of walking distance? No   Do you need help shopping? No   Do you need help preparing meals?  No   Do you need help with housework?  No   Do you need help with laundry? No   Do you need help taking your medications? No   Do you need help managing money? No   Do you ever drive or ride in a car without wearing a seat belt? No   Have you felt unusual stress, anger or loneliness in the last month? No   Who do you live with? Spouse   If you need help, do you have trouble finding someone available to you? Yes   Have you been bothered in the last four weeks by sexual problems? Yes   Do you have difficulty concentrating, remembering or making decisions? No       Age-appropriate Screening Schedule:  Refer to the list below for future screening recommendations based on patient's age, sex and/or medical conditions. Orders for these recommended tests are listed in the plan section. The patient has been provided with a written plan.    Health Maintenance   Topic Date Due    ANNUAL WELLNESS VISIT  Never done    COLORECTAL CANCER SCREENING  08/01/2023    BMI FOLLOWUP  02/27/2024    LIPID PANEL  10/13/2024    TDAP/TD VACCINES (2 - Td or Tdap) 09/01/2025    COVID-19 Vaccine  Completed    INFLUENZA VACCINE  Completed    Pneumococcal Vaccine 65+  Completed    HEPATITIS C SCREENING  Addressed    ZOSTER VACCINE  Addressed                  CMS Preventative Services Quick Reference  Risk Factors Identified During Encounter  None Identified  The above risks/problems have been discussed with the patient.  Pertinent information has been shared with the patient in the After Visit Summary.  An After Visit Summary and PPPS were made available to the patient.    Follow Up:   Next Medicare Wellness visit to be scheduled in 1  "year.       Additional E&M Note during same encounter follows:  Patient has multiple medical problems which are significant and separately identifiable that require additional work above and beyond the Medicare Wellness Visit.      Chief Complaint  Medicare Wellness-subsequent    Subjective        This patient has developed a lesion on his right nipple that he actually was referred to see dermatology about years ago but it is gotten bigger.    He has developed some psoriasis on his scalp and he would like a medication to try to address this issue.      Jose Ramon PATTON Lavon is also being seen today for skin lesion on his right nipple, psoriatic lesion on his scalp, hypertension, paroxysmal atrial fibrillation, hyperglycemia.    Review of Systems   Constitutional: Negative.    HENT: Negative.     Respiratory: Negative.     Cardiovascular: Negative.    Gastrointestinal: Negative.    Musculoskeletal: Negative.    Psychiatric/Behavioral: Negative.         Objective   Vital Signs:  /78 (BP Location: Right arm, Patient Position: Sitting, Cuff Size: Adult)   Pulse 78   Temp 98 °F (36.7 °C) (Oral)   Resp 16   Ht 182.9 cm (72.01\")   Wt 126 kg (278 lb 6.4 oz)   SpO2 95%   BMI 37.75 kg/m²     Physical Exam  Vitals and nursing note reviewed.   Constitutional:       Appearance: Normal appearance. He is obese.      Comments: Pleasant, neatly groomed, no distress.   Cardiovascular:      Rate and Rhythm: Regular rhythm.      Heart sounds: Normal heart sounds. No murmur heard.     No gallop.   Pulmonary:      Effort: No respiratory distress.      Breath sounds: Normal breath sounds. No wheezing or rales.   Skin:     Comments: He has a verrucous dark lesion on his right nipple.  It is painless.  It is not friable.  There is no induration.    He had a scaly lesion on his scalp right at his hairline.  It looks psoriatic.   Neurological:      Mental Status: He is alert and oriented to person, place, and time.   Psychiatric: "         Mood and Affect: Mood normal.         Behavior: Behavior normal.         Thought Content: Thought content normal.                         Assessment and Plan   Diagnoses and all orders for this visit:    1. Lesion of right nipple (Primary)  -     Ambulatory Referral to Dermatology    2. Paroxysmal atrial fibrillation    3. Prediabetes    4. Medicare annual wellness visit, subsequent    5. Narcolepsy without cataplexy    Other orders  -     betamethasone dipropionate 0.05 % lotion; Apply  topically to the appropriate area as directed 2 (Two) Times a Day. Apply small amount to scalp once daily.  Dispense: 60 mL; Refill: 1    I have referred him to Dr. Giorgio Block regarding lesion on his nipple.  I just like Dr. Block to take a look at it and see what he thinks.    I sent out a prescription for betamethasone lotion to use on his scaly scalp to see whether there is improvement there for I think he is correct that he has psoriasis there.    He has paroxysmal atrial fibrillation and is heart rate today is normal.    His blood pressure is a bit elevated.  He is currently taking metoprolol 50 mg daily.  I asked him to increase to metoprolol XL 75 mg daily.  I would like to have him back in a month or so to recheck his blood pressure.    He has narcolepsy without cataplexy and this seems to be well controlled with Vyvanse.             Follow Up   No follow-ups on file.  Patient was given instructions and counseling regarding his condition or for health maintenance advice. Please see specific information pulled into the AVS if appropriate.

## 2023-10-26 ENCOUNTER — TELEPHONE (OUTPATIENT)
Dept: FAMILY MEDICINE CLINIC | Facility: CLINIC | Age: 68
End: 2023-10-26

## 2023-10-26 NOTE — TELEPHONE ENCOUNTER
Caller: Jose Ramon Doll    Relationship: Self    Best call back number: [phone]    What medication are you requesting: COUGH SYRUP WITH CODEINE AND DECONGESTANT     What are your current symptoms: COUGH, PRESSURE BEHIND EYES    How long have you been experiencing symptoms: PATIENT WAS SICK AND WAS GIVEN AN ANTIBIOTIC, BUT HIS SYMPTOMS CLEARED UP AND THEY STARTED AGAIN 10/22    Have you had these symptoms before:    [x] Yes  [] No    Have you been treated for these symptoms before:   [x] Yes  [] No    If a prescription is needed, what is your preferred pharmacy and phone number:  [id number] [address] AT [facility] - [phone] [facility] [phone]  [phone]     Additional notes: PLEASE ADVISE PATIENT.

## 2023-10-27 ENCOUNTER — TELEMEDICINE (OUTPATIENT)
Dept: FAMILY MEDICINE CLINIC | Facility: CLINIC | Age: 68
End: 2023-10-27
Payer: MEDICARE

## 2023-10-27 DIAGNOSIS — J06.9 URTI (ACUTE UPPER RESPIRATORY INFECTION): Primary | ICD-10-CM

## 2023-10-27 RX ORDER — GUAIFENESIN AND CODEINE PHOSPHATE 100; 10 MG/5ML; MG/5ML
5 SOLUTION ORAL 3 TIMES DAILY PRN
Qty: 237 ML | Refills: 0 | Status: SHIPPED | OUTPATIENT
Start: 2023-10-27

## 2023-10-27 NOTE — PROGRESS NOTES
"Chief Complaint  Nasal Congestion (Coughing up phlegm ), Headache, and Allergies    Subjective        Jose Ramon Doll presents to [facility]  History of Present Illness  This is a video visit.  This is a 68-year-old male who is complaining of cough, sneezing, nasal congestion and sinus pressure behind his eyes for the past 4 to 5 days.  He was recently seen in the office a month ago with similar presentation and was given antibiotics, cough syrup and Tessalon Perles.  COVID and flu PCR test done at that time was negative.  Symptoms relieved after taking cough syrup however since Monday this week symptoms seems to have returned, now associated with chills and body aches.  No fever, headache or sore throat.  He has been taking OTC zyrtec and is concerned about blood pressure elevation.  BP today at home, taking before this video visit was 137/82 mmHg.      Objective   Vital Signs:  There were no vitals taken for this visit.  Estimated body mass index is 37.75 kg/m² as calculated from the following:    Height as of 10/18/23: 182.9 cm (72.01\").    Weight as of 10/18/23: 126 kg (278 lb 6.4 oz).               Physical Exam   Physical exam not done as this was a video visit      Result Review :                   Assessment and Plan   Diagnoses and all orders for this visit:    1. URTI (acute upper respiratory infection) (Primary)  Comments:  Likely viral in origin. Encouraged him to take Tylenol as needed for pain relief & zyrtec for nasal congestion.  Refilled guaifenesin-codeine cough syrup PRN  Orders:  -     guaiFENesin-codeine (GUAIFENESIN AC) 100-10 MG/5ML liquid; Take 5 mL by mouth 3 (Three) Times a Day As Needed for Cough.  Dispense: 237 mL; Refill: 0    Advised him to continue to take his BP meds and keep monitoring his blood pressure at home.  We will continue to follow his BP during his office visit.         Follow Up   No follow-ups on file.  Patient was given instructions " and counseling regarding his condition or for health maintenance advice. Please see specific information pulled into the AVS if appropriate.

## 2023-11-06 RX ORDER — FLUTICASONE PROPIONATE 50 MCG
2 SPRAY, SUSPENSION (ML) NASAL DAILY
Qty: 18.2 ML | Refills: 3 | Status: SHIPPED | OUTPATIENT
Start: 2023-11-06

## 2023-11-06 RX ORDER — AZELASTINE 1 MG/ML
2 SPRAY, METERED NASAL 2 TIMES DAILY
Qty: 90 ML | Refills: 3 | Status: SHIPPED | OUTPATIENT
Start: 2023-11-06

## 2023-11-06 NOTE — TELEPHONE ENCOUNTER
Rx Refill Note  Requested Prescriptions     Pending Prescriptions Disp Refills    azelastine (ASTELIN) 0.1 % nasal spray 90 mL 3     Si sprays into the nostril(s) as directed by provider 2 (Two) Times a Day. Use in each nostril as directed    fluticasone (FLONASE) 50 MCG/ACT nasal spray 18.2 mL 3     Si sprays into the nostril(s) as directed by provider Daily.      Last office visit with prescribing clinician: 10/18/2023   Last telemedicine visit with prescribing clinician: 10/27/2023   Next office visit with prescribing clinician: 2024                         Would you like a call back once the refill request has been completed: [] Yes [] No    If the office needs to give you a call back, can they leave a voicemail: [] Yes [] No    Paul Montiel MA  23, 13:32 EST

## 2023-11-08 RX ORDER — VALSARTAN 160 MG/1
160 TABLET ORAL 2 TIMES DAILY
Qty: 180 TABLET | Refills: 2 | Status: SHIPPED | OUTPATIENT
Start: 2023-11-08

## 2023-11-14 ENCOUNTER — APPOINTMENT (OUTPATIENT)
Dept: CT IMAGING | Facility: HOSPITAL | Age: 68
End: 2023-11-14
Payer: MEDICARE

## 2023-11-14 ENCOUNTER — HOSPITAL ENCOUNTER (EMERGENCY)
Facility: HOSPITAL | Age: 68
Discharge: HOME | End: 2023-11-14
Attending: EMERGENCY MEDICINE | Admitting: EMERGENCY MEDICINE
Payer: MEDICARE

## 2023-11-14 VITALS
BODY MASS INDEX: 37.93 KG/M2 | DIASTOLIC BLOOD PRESSURE: 66 MMHG | OXYGEN SATURATION: 97 % | WEIGHT: 280 LBS | HEIGHT: 72 IN | TEMPERATURE: 97.7 F | SYSTOLIC BLOOD PRESSURE: 124 MMHG | HEART RATE: 67 BPM | RESPIRATION RATE: 16 BRPM

## 2023-11-14 DIAGNOSIS — T50.905A ADVERSE EFFECT OF DRUG, INITIAL ENCOUNTER: Primary | ICD-10-CM

## 2023-11-14 LAB
ALBUMIN SERPL-MCNC: 4.1 G/DL (ref 3.5–5.2)
ALBUMIN/GLOB SERPL: 1.7 G/DL
ALP SERPL-CCNC: 79 U/L (ref 39–117)
ALT SERPL W P-5'-P-CCNC: 41 U/L (ref 1–41)
AMPHET+METHAMPHET UR QL: POSITIVE
ANION GAP SERPL CALCULATED.3IONS-SCNC: 7 MMOL/L (ref 5–15)
AST SERPL-CCNC: 24 U/L (ref 1–40)
BARBITURATES UR QL SCN: NEGATIVE
BASOPHILS # BLD AUTO: 0.03 10*3/MM3 (ref 0–0.2)
BASOPHILS NFR BLD AUTO: 0.3 % (ref 0–1.5)
BENZODIAZ UR QL SCN: NEGATIVE
BILIRUB SERPL-MCNC: 0.6 MG/DL (ref 0–1.2)
BUN SERPL-MCNC: 11 MG/DL (ref 8–23)
BUN/CREAT SERPL: 13.3 (ref 7–25)
CALCIUM SPEC-SCNC: 8.9 MG/DL (ref 8.6–10.5)
CANNABINOIDS SERPL QL: POSITIVE
CHLORIDE SERPL-SCNC: 102 MMOL/L (ref 98–107)
CO2 SERPL-SCNC: 28 MMOL/L (ref 22–29)
COCAINE UR QL: NEGATIVE
CREAT SERPL-MCNC: 0.83 MG/DL (ref 0.76–1.27)
DEPRECATED RDW RBC AUTO: 43.5 FL (ref 37–54)
EGFRCR SERPLBLD CKD-EPI 2021: 96.5 ML/MIN/1.73
EOSINOPHIL # BLD AUTO: 0.19 10*3/MM3 (ref 0–0.4)
EOSINOPHIL NFR BLD AUTO: 2.1 % (ref 0.3–6.2)
ERYTHROCYTE [DISTWIDTH] IN BLOOD BY AUTOMATED COUNT: 13.9 % (ref 12.3–15.4)
ETHANOL BLD-MCNC: <10 MG/DL (ref 0–10)
ETHANOL UR QL: <0.01 %
FENTANYL UR-MCNC: NEGATIVE NG/ML
GLOBULIN UR ELPH-MCNC: 2.4 GM/DL
GLUCOSE SERPL-MCNC: 159 MG/DL (ref 65–99)
HCT VFR BLD AUTO: 44.5 % (ref 37.5–51)
HGB BLD-MCNC: 15.1 G/DL (ref 13–17.7)
IMM GRANULOCYTES # BLD AUTO: 0.01 10*3/MM3 (ref 0–0.05)
IMM GRANULOCYTES NFR BLD AUTO: 0.1 % (ref 0–0.5)
LYMPHOCYTES # BLD AUTO: 0.91 10*3/MM3 (ref 0.7–3.1)
LYMPHOCYTES NFR BLD AUTO: 10 % (ref 19.6–45.3)
MCH RBC QN AUTO: 30 PG (ref 26.6–33)
MCHC RBC AUTO-ENTMCNC: 33.9 G/DL (ref 31.5–35.7)
MCV RBC AUTO: 88.3 FL (ref 79–97)
METHADONE UR QL SCN: NEGATIVE
MONOCYTES # BLD AUTO: 0.38 10*3/MM3 (ref 0.1–0.9)
MONOCYTES NFR BLD AUTO: 4.2 % (ref 5–12)
NEUTROPHILS NFR BLD AUTO: 7.54 10*3/MM3 (ref 1.7–7)
NEUTROPHILS NFR BLD AUTO: 83.3 % (ref 42.7–76)
NRBC BLD AUTO-RTO: 0 /100 WBC (ref 0–0.2)
OPIATES UR QL: NEGATIVE
OXYCODONE UR QL SCN: NEGATIVE
PLATELET # BLD AUTO: 164 10*3/MM3 (ref 140–450)
PMV BLD AUTO: 10.5 FL (ref 6–12)
POTASSIUM SERPL-SCNC: 4 MMOL/L (ref 3.5–5.2)
PROT SERPL-MCNC: 6.5 G/DL (ref 6–8.5)
RBC # BLD AUTO: 5.04 10*6/MM3 (ref 4.14–5.8)
SODIUM SERPL-SCNC: 137 MMOL/L (ref 136–145)
WBC NRBC COR # BLD: 9.06 10*3/MM3 (ref 3.4–10.8)

## 2023-11-14 PROCEDURE — 80307 DRUG TEST PRSMV CHEM ANLYZR: CPT | Performed by: EMERGENCY MEDICINE

## 2023-11-14 PROCEDURE — 99284 EMERGENCY DEPT VISIT MOD MDM: CPT

## 2023-11-14 PROCEDURE — 85025 COMPLETE CBC W/AUTO DIFF WBC: CPT | Performed by: EMERGENCY MEDICINE

## 2023-11-14 PROCEDURE — 70450 CT HEAD/BRAIN W/O DYE: CPT

## 2023-11-14 PROCEDURE — 93010 ELECTROCARDIOGRAM REPORT: CPT | Performed by: INTERNAL MEDICINE

## 2023-11-14 PROCEDURE — 93005 ELECTROCARDIOGRAM TRACING: CPT | Performed by: EMERGENCY MEDICINE

## 2023-11-14 PROCEDURE — 25810000003 SODIUM CHLORIDE 0.9 % SOLUTION: Performed by: EMERGENCY MEDICINE

## 2023-11-14 PROCEDURE — 80053 COMPREHEN METABOLIC PANEL: CPT | Performed by: EMERGENCY MEDICINE

## 2023-11-14 PROCEDURE — 82077 ASSAY SPEC XCP UR&BREATH IA: CPT | Performed by: EMERGENCY MEDICINE

## 2023-11-14 RX ORDER — SODIUM CHLORIDE 0.9 % (FLUSH) 0.9 %
10 SYRINGE (ML) INJECTION AS NEEDED
Status: DISCONTINUED | OUTPATIENT
Start: 2023-11-14 | End: 2023-11-15 | Stop reason: HOSPADM

## 2023-11-14 RX ADMIN — SODIUM CHLORIDE 500 ML: 9 INJECTION, SOLUTION INTRAVENOUS at 20:39

## 2023-11-15 LAB
QT INTERVAL: 424 MS
QTC INTERVAL: 438 MS

## 2023-11-15 NOTE — ED PROVIDER NOTES
" EMERGENCY DEPARTMENT ENCOUNTER    Room Number:  38/38  Date seen:  11/14/2023  PCP: Vasyl Urena MD  Historian: Patient and EMS      HPI:  Chief Complaint: Confusion  Context: Jose Ramon Reardon is a 68 y.o. male who presents to the ED via EMS from home after feeling \"zoned out\" after taking 30 mg of THC Gummies from a friend.  He called EMS because he was afraid that the THC may interact with his medications he is currently on.  The patient's wife states that he called her and stated his body felt funny when she went home to check on him and he was having some confusion.  Patient does have a history of A-fib and a stroke and is on Xarelto and thus they called EMS to bring him to the emergency room.  Currently the wife states the patient is improved and the patient is without complaints.      PAST MEDICAL HISTORY  Active Ambulatory Problems     Diagnosis Date Noted    Cervical spinal stenosis 02/24/2016    ED (erectile dysfunction) of non-organic origin 02/24/2016    Hypertension 02/24/2016    Lumbar radiculopathy 02/24/2016    Neoplasm of skin 02/24/2016    Obstructive sleep apnea 02/24/2016    Neck pain 03/04/2016    Benign non-nodular prostatic hyperplasia with lower urinary tract symptoms 03/04/2016    Shoulder pain, acute 03/04/2016    Urinary frequency 04/15/2016    Chronic pain of right knee 02/01/2017    Rotator cuff tendonitis 03/24/2017    Prediabetes 06/29/2019    Other fatigue 03/22/2021    Paroxysmal atrial fibrillation 05/27/2021    PVC's (premature ventricular contractions) 05/27/2021    H/O TIA (transient ischemic attack) and stroke 11/13/2020    CVA (cerebral vascular accident) 11/13/2020    Excessive daytime sleepiness 10/27/2020    Impacted cerumen of both ears 08/02/2014    Narcolepsy without cataplexy 01/14/2019    Acute medial meniscus tear of right knee 01/28/2019    First degree AV block     Medicare annual wellness visit, subsequent 08/16/2022    Age-related cataract of left eye " 08/05/2022     Resolved Ambulatory Problems     Diagnosis Date Noted    Allergic rhinitis 02/24/2016    Burning or prickling sensation 02/24/2016    Hyperlipidemia 04/15/2016    Acute medial meniscus tear of right knee 01/28/2019    Class 2 severe obesity due to excess calories with serious comorbidity in adult 01/03/2020     Past Medical History:   Diagnosis Date    Abnormal EKG     Allergic     Arthritis     ED (erectile dysfunction)     GERD (gastroesophageal reflux disease)     Glaucoma     Kidney stone     Narcolepsy     PONV (postoperative nausea and vomiting)     PVCs (premature ventricular contractions)     Sleep apnea     Urinary tract infection          REVIEW OF SYSTEMS  All systems reviewed and negative except for those discussed in HPI.       PAST SURGICAL HISTORY  Past Surgical History:   Procedure Laterality Date    ANAL FISTULOTOMY      APPENDECTOMY      BACK SURGERY      CERVICAL    CATARACT EXTRACTION Bilateral     COLONOSCOPY      KNEE ARTHROSCOPY Right 02/12/2019    Procedure: Knee Arthroscopy with PARTICIAL MEDIAL Menisectomy;  Surgeon: Dwaine Connolly MD;  Location: [facility] [place];  Service: Orthopedics    NECK SURGERY      WISDOM TOOTH EXTRACTION           FAMILY HISTORY  Family History   Problem Relation Age of Onset    Diabetes Mother     Thyroid disease Mother     Hypertension Mother     Diabetes Father     Heart disease Father     Hypertension Father     Atrial fibrillation Father     Cancer Father     Stroke Maternal Grandfather     Stroke Paternal Grandfather     Malig Hyperthermia Neg Hx          SOCIAL HISTORY  Social History     Socioeconomic History    Marital status:    Tobacco Use    Smoking status: Never     Passive exposure: Never    Smokeless tobacco: Former     Types: Chew     Quit date: 2020    Tobacco comments:     Caffeine use: daily   Vaping Use    Vaping Use: Never used   Substance and Sexual Activity    Alcohol use: Yes     Alcohol/week: 0.0 - 2.0 standard drinks  of alcohol    Drug use: Never    Sexual activity: Yes     Partners: Female     Birth control/protection: Hysterectomy         ALLERGIES  Clarithromycin and Sulfa antibiotics      PHYSICAL EXAM  ED Triage Vitals [11/14/23 2007]   Temp Heart Rate Resp BP SpO2   97.7 °F (36.5 °C) 68 16 132/72 98 %      Temp src Heart Rate Source Patient Position BP Location FiO2 (%)   Oral Monitor Lying Right arm --       Physical Exam      GENERAL: 68-year-old male in no acute distress  HENT: NCAT: nares patent: Neck supple  EYES: no scleral icterus  CV: regular rhythm, normal rate  RESPIRATORY: normal effort  ABDOMEN: soft, NTND: Bowel sounds positive  MUSCULOSKELETAL: no deformity  NEURO: alert and oriented x3 with a normal neuro exam but somewhat slow with speech  PSYCH:  calm, cooperative  SKIN: warm, dry    Vital signs and nursing notes reviewed.      LAB RESULTS  Recent Results (from the past 24 hour(s))   Comprehensive Metabolic Panel    Collection Time: 11/14/23  8:38 PM    Specimen: Blood   Result Value Ref Range    Glucose 159 (H) 65 - 99 mg/dL    BUN 11 8 - 23 mg/dL    Creatinine 0.83 0.76 - 1.27 mg/dL    Sodium 137 136 - 145 mmol/L    Potassium 4.0 3.5 - 5.2 mmol/L    Chloride 102 98 - 107 mmol/L    CO2 28.0 22.0 - 29.0 mmol/L    Calcium 8.9 8.6 - 10.5 mg/dL    Total Protein 6.5 6.0 - 8.5 g/dL    Albumin 4.1 3.5 - 5.2 g/dL    ALT (SGPT) 41 1 - 41 U/L    AST (SGOT) 24 1 - 40 U/L    Alkaline Phosphatase 79 39 - 117 U/L    Total Bilirubin 0.6 0.0 - 1.2 mg/dL    Globulin 2.4 gm/dL    A/G Ratio 1.7 g/dL    BUN/Creatinine Ratio 13.3 7.0 - 25.0    Anion Gap 7.0 5.0 - 15.0 mmol/L    eGFR 96.5 >60.0 mL/min/1.73   Ethanol    Collection Time: 11/14/23  8:38 PM    Specimen: Blood   Result Value Ref Range    Ethanol <10 0 - 10 mg/dL    Ethanol % <0.010 %   CBC Auto Differential    Collection Time: 11/14/23  8:38 PM    Specimen: Blood   Result Value Ref Range    WBC 9.06 3.40 - 10.80 10*3/mm3    RBC 5.04 4.14 - 5.80 10*6/mm3     Hemoglobin 15.1 13.0 - 17.7 g/dL    Hematocrit 44.5 37.5 - 51.0 %    MCV 88.3 79.0 - 97.0 fL    MCH 30.0 26.6 - 33.0 pg    MCHC 33.9 31.5 - 35.7 g/dL    RDW 13.9 12.3 - 15.4 %    RDW-SD 43.5 37.0 - 54.0 fl    MPV 10.5 6.0 - 12.0 fL    Platelets 164 140 - 450 10*3/mm3    Neutrophil % 83.3 (H) 42.7 - 76.0 %    Lymphocyte % 10.0 (L) 19.6 - 45.3 %    Monocyte % 4.2 (L) 5.0 - 12.0 %    Eosinophil % 2.1 0.3 - 6.2 %    Basophil % 0.3 0.0 - 1.5 %    Immature Grans % 0.1 0.0 - 0.5 %    Neutrophils, Absolute 7.54 (H) 1.70 - 7.00 10*3/mm3    Lymphocytes, Absolute 0.91 0.70 - 3.10 10*3/mm3    Monocytes, Absolute 0.38 0.10 - 0.90 10*3/mm3    Eosinophils, Absolute 0.19 0.00 - 0.40 10*3/mm3    Basophils, Absolute 0.03 0.00 - 0.20 10*3/mm3    Immature Grans, Absolute 0.01 0.00 - 0.05 10*3/mm3    nRBC 0.0 0.0 - 0.2 /100 WBC   ECG 12 Lead Stroke Evaluation    Collection Time: 11/14/23  8:42 PM   Result Value Ref Range    QT Interval 424 ms    QTC Interval 438 ms   Urine Drug Screen - Urine, Clean Catch    Collection Time: 11/14/23  9:21 PM    Specimen: Urine, Clean Catch   Result Value Ref Range    Amphet/Methamphet, Screen Positive (A) Negative    Barbiturates Screen, Urine Negative Negative    Benzodiazepine Screen, Urine Negative Negative    Cocaine Screen, Urine Negative Negative    Opiate Screen Negative Negative    THC, Screen, Urine Positive (A) Negative    Methadone Screen, Urine Negative Negative    Oxycodone Screen, Urine Negative Negative    Fentanyl, Urine Negative Negative       Ordered the above labs and reviewed the results.        RADIOLOGY  CT Head Without Contrast    Result Date: 11/14/2023  CT OF THE BRAIN WITHOUT CONTRAST 11/14/2023  HISTORY: Dizziness. Confusion.  Axial images were obtained through the brain without intravenous contrast.  There is mild diffuse atrophy. Minimal decreased attenuation of the periventricular white matter is seen consistent with small vessel white matter ischemic disease. The brain  parenchyma and ventricular system otherwise appear within normal limits. No mass lesions, midline shift, intracranial hemorrhage or evidence of infarction is demonstrated. A probable mucus retention cyst is seen in the posterior right maxillary sinus.      1. No acute process identified.  Radiation dose reduction techniques were utilized, including automated exposure control and exposure modulation based on body size.   This report was finalized on 11/14/2023 8:54 PM by Dr. Marvin Metcalf M.D on Workstation: [id number]       Ordered the above noted radiological studies. Reviewed by me in PACS.            PROCEDURES  Procedures          MEDICATIONS GIVEN IN ER  Medications   sodium chloride 0.9 % flush 10 mL (has no administration in time range)   sodium chloride 0.9 % bolus 500 mL (0 mL Intravenous Stopped 11/14/23 2205)             MEDICAL DECISION MAKING, PROGRESS, and CONSULTS    All labs have been independently reviewed by me.  All radiology studies have been reviewed by me and I have also reviewed the radiology report.   EKG's independently viewed and interpreted by me.  Discussion below represents my analysis of pertinent findings related to patient's condition, differential diagnosis, treatment plan and final disposition.      Additional sources:  - Discussed/ obtained information from independent historians: The patient's wife is here who states he seems more confused than baseline      - Chronic or social conditions impacting care: Patient lives at home with his wife    - Shared decision making: After shared decision-making discussion we agree he is stable for discharge and outpatient follow-up      Orders placed during this visit:  Orders Placed This Encounter   Procedures    CT Head Without Contrast    Comprehensive Metabolic Panel    Ethanol    Urine Drug Screen - Urine, Clean Catch    CBC Auto Differential    Monitor Blood Pressure    Pulse Oximetry, Continuous    ECG 12 Lead Stroke Evaluation     Insert Peripheral IV    CBC & Differential         Differential diagnosis:  My differential diagnosis includes but is not limited to stroke, encephalopathy, toxic / metabolic, hypoglycemia, toxin-induced, psychogenic, medication-induced, or infectious.      Independent interpretation of labs, radiology studies, and discussions with consultants:  ED Course as of 11/14/23 2219 Tue Nov 14, 2023 2031 NIHSS:  0-->Alert: keenly responsive  0-->Answers both questions correctly  0-->Performs both tasks correctly  0=normal  0=No visual loss  0=Normal symmetric movement  0-->No drift: limb holds 90 (or 45) degrees for full 10 secs  0-->No drift: limb holds 90 (or 45) degrees for full 10 secs  0-->No drift: limb holds 90 (or 45) degrees for full 10 secs  0-->No drift: limb holds 90 (or 45) degrees for full 10 secs  0=Absent  0=Normal; no sensory loss  0=No aphasia, normal  0=Normal  0=No abnormality  Total score: 0 [GP]   2031 The patient presents with likely marijuana induced confusion but with his history of A-fib and stroke I will obtain further labs and imaging for evaluation.  We will place the patient on the monitor, give him IV fluids and watch him for now [GP]   2104 EKG    EKG time: 2059  Rhythm/Rate: Normal sinus rhythm at 64  No Acute Ischemia  Non-Specific ST-T changes    Changed compared to prior on 2/25/2021 when the patient was having ventricular bigeminy    Interpreted Contemporaneously by me.  Independently viewed by me     [GP]   2114 My independent interpretation of the patient's head CT is no large stroke, hemorrhage or mass [GP]   2207 The patient's EtOH is negative.  His UDS is positive for amphetamines and the patient is on Vyvanse.  Is also positive for THC. [GP]   2215 Currently the patient is alert and orient x3 and states he feels much better.  I advised him not to take Gummies anymore and to follow-up with his PCP for any problems.  The patient understands and agrees with the plan. [GP]      ED  Course User Index  [GP] Mor Franks MD               DIAGNOSIS  Final diagnoses:   Adverse effect of drug, initial encounter         DISPOSITION  Discharged            Latest Documented Vital Signs:  As of 22:19 EST  BP- 132/72 HR- 68 Temp- 97.7 °F (36.5 °C) (Oral) O2 sat- 98%--      --------------------  Please note that portions of this were completed with a voice recognition program.       Note Disclaimer: At [facility], we believe that sharing information builds trust and better relationships. You are receiving this note because you are receiving care at [facility] or recently visited. It is possible you will see health information before a provider has talked with you about it. This kind of information can be easy to misunderstand. To help you fully understand what it means for your health, we urge you to discuss this note with your provider.             Mor Franks MD  11/14/23 6840

## 2023-12-26 ENCOUNTER — TELEPHONE (OUTPATIENT)
Dept: FAMILY MEDICINE CLINIC | Facility: CLINIC | Age: 68
End: 2023-12-26
Payer: MEDICARE

## 2023-12-26 RX ORDER — METOPROLOL SUCCINATE 25 MG/1
25 TABLET, EXTENDED RELEASE ORAL 2 TIMES DAILY
Qty: 180 TABLET | Refills: 1 | Status: SHIPPED | OUTPATIENT
Start: 2023-12-26

## 2023-12-26 NOTE — TELEPHONE ENCOUNTER
Caller: Jose Ramon Reardon    Relationship: Self    Best call back number: [phone]     Which medication are you concerned about: metoprolol succinate XL (TOPROL-XL) 25 MG 24 hr tablet     Who prescribed you this medication: JASON IVEY    What are your concerns: PATIENT STATED THIS PRESCRIPTION MUST BE CHANGED TO THREE TIMES DAILY.    PATIENT STATED HE IS OUT OF THIS MEDICATION AS HIS PRESCRIPTION WAS NOT UPDATED TO REFLECT THIS CHANGE    PLEASE SEND UPDATED PRESCRIPTION TO PHARMACY ASAP.

## 2023-12-26 NOTE — TELEPHONE ENCOUNTER
Spoke with pt and he was to only take TID while on a specific allergy medication according to patient. Pt verbally understood he is to be on BID, as reflected in his chart and the increase was only while on the allergy medication, as stated by the pt that PCP informed him to do.

## 2024-01-08 RX ORDER — ROSUVASTATIN CALCIUM 20 MG/1
20 TABLET, COATED ORAL
Qty: 90 TABLET | Refills: 1 | Status: SHIPPED | OUTPATIENT
Start: 2024-01-08 | End: 2024-04-07

## 2024-01-08 RX ORDER — ROSUVASTATIN CALCIUM 20 MG/1
20 TABLET, COATED ORAL
Qty: 90 TABLET | Refills: 3 | OUTPATIENT
Start: 2024-01-08

## 2024-01-08 RX ORDER — METOPROLOL SUCCINATE 25 MG/1
25 TABLET, EXTENDED RELEASE ORAL 2 TIMES DAILY
Qty: 180 TABLET | Refills: 1 | Status: SHIPPED | OUTPATIENT
Start: 2024-01-08

## 2024-01-23 DIAGNOSIS — E55.9 VITAMIN D DEFICIENCY: ICD-10-CM

## 2024-01-23 DIAGNOSIS — E78.5 HYPERLIPIDEMIA, UNSPECIFIED HYPERLIPIDEMIA TYPE: ICD-10-CM

## 2024-01-23 DIAGNOSIS — R73.03 PREDIABETES: Primary | ICD-10-CM

## 2024-01-23 DIAGNOSIS — I10 PRIMARY HYPERTENSION: ICD-10-CM

## 2024-01-26 LAB
25(OH)D3+25(OH)D2 SERPL-MCNC: 12.5 NG/ML (ref 30–100)
ALBUMIN SERPL-MCNC: 4.3 G/DL (ref 3.5–5.2)
ALBUMIN/GLOB SERPL: 1.8 G/DL
ALP SERPL-CCNC: 86 U/L (ref 39–117)
ALT SERPL-CCNC: 31 U/L (ref 1–41)
APPEARANCE UR: CLEAR
AST SERPL-CCNC: 17 U/L (ref 1–40)
BACTERIA #/AREA URNS HPF: NORMAL /HPF
BASOPHILS # BLD AUTO: 0.04 10*3/MM3 (ref 0–0.2)
BASOPHILS NFR BLD AUTO: 0.6 % (ref 0–1.5)
BILIRUB SERPL-MCNC: 0.6 MG/DL (ref 0–1.2)
BILIRUB UR QL STRIP: NEGATIVE
BUN SERPL-MCNC: 10 MG/DL (ref 8–23)
BUN/CREAT SERPL: 11.4 (ref 7–25)
CALCIUM SERPL-MCNC: 9 MG/DL (ref 8.6–10.5)
CASTS URNS MICRO: NORMAL
CHLORIDE SERPL-SCNC: 104 MMOL/L (ref 98–107)
CHOLEST SERPL-MCNC: 121 MG/DL (ref 0–200)
CHOLEST/HDLC SERPL: 2.69 {RATIO}
CO2 SERPL-SCNC: 27.6 MMOL/L (ref 22–29)
COLOR UR: ABNORMAL
CREAT SERPL-MCNC: 0.88 MG/DL (ref 0.76–1.27)
EGFRCR SERPLBLD CKD-EPI 2021: 94.8 ML/MIN/1.73
EOSINOPHIL # BLD AUTO: 0.24 10*3/MM3 (ref 0–0.4)
EOSINOPHIL NFR BLD AUTO: 3.4 % (ref 0.3–6.2)
EPI CELLS #/AREA URNS HPF: NORMAL /HPF
ERYTHROCYTE [DISTWIDTH] IN BLOOD BY AUTOMATED COUNT: 14.3 % (ref 12.3–15.4)
GLOBULIN SER CALC-MCNC: 2.4 GM/DL
GLUCOSE SERPL-MCNC: 114 MG/DL (ref 65–99)
GLUCOSE UR QL STRIP: NEGATIVE
HBA1C MFR BLD: 6.5 % (ref 4.8–5.6)
HCT VFR BLD AUTO: 48.6 % (ref 37.5–51)
HDLC SERPL-MCNC: 45 MG/DL (ref 40–60)
HGB BLD-MCNC: 16.2 G/DL (ref 13–17.7)
HGB UR QL STRIP: NEGATIVE
IMM GRANULOCYTES # BLD AUTO: 0.01 10*3/MM3 (ref 0–0.05)
IMM GRANULOCYTES NFR BLD AUTO: 0.1 % (ref 0–0.5)
KETONES UR QL STRIP: NEGATIVE
LDLC SERPL CALC-MCNC: 55 MG/DL (ref 0–100)
LEUKOCYTE ESTERASE UR QL STRIP: NEGATIVE
LYMPHOCYTES # BLD AUTO: 1.64 10*3/MM3 (ref 0.7–3.1)
LYMPHOCYTES NFR BLD AUTO: 23.6 % (ref 19.6–45.3)
MCH RBC QN AUTO: 29.9 PG (ref 26.6–33)
MCHC RBC AUTO-ENTMCNC: 33.3 G/DL (ref 31.5–35.7)
MCV RBC AUTO: 89.7 FL (ref 79–97)
MONOCYTES # BLD AUTO: 0.63 10*3/MM3 (ref 0.1–0.9)
MONOCYTES NFR BLD AUTO: 9.1 % (ref 5–12)
NEUTROPHILS # BLD AUTO: 4.4 10*3/MM3 (ref 1.7–7)
NEUTROPHILS NFR BLD AUTO: 63.2 % (ref 42.7–76)
NITRITE UR QL STRIP: NEGATIVE
NRBC BLD AUTO-RTO: 0 /100 WBC (ref 0–0.2)
PH UR STRIP: 6 [PH] (ref 5–8)
PLATELET # BLD AUTO: 174 10*3/MM3 (ref 140–450)
POTASSIUM SERPL-SCNC: 4.2 MMOL/L (ref 3.5–5.2)
PROT SERPL-MCNC: 6.7 G/DL (ref 6–8.5)
PROT UR QL STRIP: NEGATIVE
RBC # BLD AUTO: 5.42 10*6/MM3 (ref 4.14–5.8)
RBC #/AREA URNS HPF: NORMAL /HPF
SODIUM SERPL-SCNC: 141 MMOL/L (ref 136–145)
SP GR UR STRIP: 1.02 (ref 1–1.03)
TRIGL SERPL-MCNC: 118 MG/DL (ref 0–150)
UROBILINOGEN UR STRIP-MCNC: ABNORMAL MG/DL
VLDLC SERPL CALC-MCNC: 21 MG/DL (ref 5–40)
WBC # BLD AUTO: 6.96 10*3/MM3 (ref 3.4–10.8)
WBC #/AREA URNS HPF: NORMAL /HPF

## 2024-03-07 ENCOUNTER — OFFICE VISIT (OUTPATIENT)
Dept: FAMILY MEDICINE CLINIC | Facility: CLINIC | Age: 69
End: 2024-03-07
Payer: MEDICARE

## 2024-03-07 VITALS
TEMPERATURE: 96.4 F | DIASTOLIC BLOOD PRESSURE: 64 MMHG | WEIGHT: 276.6 LBS | RESPIRATION RATE: 16 BRPM | SYSTOLIC BLOOD PRESSURE: 122 MMHG | BODY MASS INDEX: 37.46 KG/M2 | HEART RATE: 81 BPM | HEIGHT: 72 IN | OXYGEN SATURATION: 96 %

## 2024-03-07 DIAGNOSIS — I10 PRIMARY HYPERTENSION: ICD-10-CM

## 2024-03-07 DIAGNOSIS — Z29.11 NEED FOR RSV IMMUNIZATION: ICD-10-CM

## 2024-03-07 DIAGNOSIS — L21.0 DANDRUFF IN ADULT: ICD-10-CM

## 2024-03-07 DIAGNOSIS — Z12.11 COLON CANCER SCREENING: Primary | ICD-10-CM

## 2024-03-07 DIAGNOSIS — G47.33 OBSTRUCTIVE SLEEP APNEA: ICD-10-CM

## 2024-03-07 DIAGNOSIS — G47.419 NARCOLEPSY WITHOUT CATAPLEXY: ICD-10-CM

## 2024-03-07 RX ORDER — DEXTROAMPHETAMINE SACCHARATE, AMPHETAMINE ASPARTATE, DEXTROAMPHETAMINE SULFATE AND AMPHETAMINE SULFATE 5; 5; 5; 5 MG/1; MG/1; MG/1; MG/1
20 TABLET ORAL 3 TIMES DAILY
COMMUNITY

## 2024-03-07 RX ORDER — RESPIRATORY SYNCYTIAL VISUS VACCINE RECOMBINANT, ADJUVANTED 120MCG/0.5
0.5 KIT INTRAMUSCULAR ONCE
COMMUNITY
End: 2024-03-07 | Stop reason: SDUPTHER

## 2024-03-08 PROBLEM — L21.0 DANDRUFF IN ADULT: Status: ACTIVE | Noted: 2024-03-08

## 2024-03-08 RX ORDER — RESPIRATORY SYNCYTIAL VISUS VACCINE RECOMBINANT, ADJUVANTED 120MCG/0.5
0.5 KIT INTRAMUSCULAR ONCE
Qty: 0.5 ML | Refills: 0 | Status: SHIPPED | OUTPATIENT
Start: 2024-03-08 | End: 2024-03-08

## 2024-03-08 NOTE — PROGRESS NOTES
Subjective   Jose Ramon Reardon is a 68 y.o. male. Patient is here today for   Chief Complaint   Patient presents with    Hyperlipidemia     Discuss getting cologard and rsv     Hypertension    Rash     On scalp in beard dandruff psoaris           Vitals:    03/07/24 1352   BP: 122/64   Pulse: 81   Resp: 16   Temp: 96.4 °F (35.8 °C)   SpO2: 96%     Body mass index is 37.51 kg/m².      Past Medical History:   Diagnosis Date    Abnormal EKG     IN PAST    Acute medial meniscus tear of right knee 1/28/2019    Added automatically from request for surgery [id number]  Formatting of this note might be different from the original. Added automatically from request for surgery [id number]  Formatting of this note might be different from the original. Added automatically from request for surgery [id number]    Allergic     Arthritis     ED (erectile dysfunction)     First degree AV block     GERD (gastroesophageal reflux disease)     Glaucoma     Hypertension     IN PAST  NO MEDS    Kidney stone     Narcolepsy     Paroxysmal atrial fibrillation     PONV (postoperative nausea and vomiting)     1969 AND 1998    PVCs (premature ventricular contractions)     Sleep apnea     Urinary tract infection       Allergies   Allergen Reactions    Clarithromycin Other (See Comments)     Unable to eat     Sulfa Antibiotics Myalgia      Social History     Socioeconomic History    Marital status:    Tobacco Use    Smoking status: Never     Passive exposure: Never    Smokeless tobacco: Former     Types: Chew     Quit date: 2020    Tobacco comments:     Caffeine use: daily   Vaping Use    Vaping status: Never Used   Substance and Sexual Activity    Alcohol use: Yes     Alcohol/week: 0.0 - 2.0 standard drinks of alcohol    Drug use: Never    Sexual activity: Yes     Partners: Female     Birth control/protection: Hysterectomy        Current Outpatient Medications:     amphetamine-dextroamphetamine (ADDERALL) 20 MG tablet, Take 1 tablet by mouth 3  (Three) Times a Day. 2 tabs in am and 1 at lunch, Disp: , Rfl:     azelastine (ASTELIN) 0.1 % nasal spray, 2 sprays into the nostril(s) as directed by provider 2 (Two) Times a Day. Use in each nostril as directed, Disp: 90 mL, Rfl: 3    betamethasone dipropionate 0.05 % lotion, Apply  topically to the appropriate area as directed 2 (Two) Times a Day. Apply small amount to scalp once daily., Disp: 60 mL, Rfl: 1    fluticasone (FLONASE) 50 MCG/ACT nasal spray, 2 sprays into the nostril(s) as directed by provider Daily., Disp: 18.2 mL, Rfl: 3    lisdexamfetamine (VYVANSE) 40 MG capsule, Take 1 capsule by mouth Daily, Disp: 30 capsule, Rfl: 1    metoprolol succinate XL (TOPROL-XL) 25 MG 24 hr tablet, TAKE 1 TABLET BY MOUTH TWICE DAILY, Disp: 180 tablet, Rfl: 1    rivaroxaban (Xarelto) 20 MG tablet, Take 1 tablet by mouth Daily With Dinner., Disp: 90 tablet, Rfl: 3    rosuvastatin (CRESTOR) 20 MG tablet, Take 1 tablet by mouth every night at bedtime for 90 days., Disp: 90 tablet, Rfl: 1    timolol (TIMOPTIC) 0.5 % ophthalmic solution, Apply  to eye(s) as directed by provider Every 12 (Twelve) Hours., Disp: , Rfl:     valsartan (DIOVAN) 160 MG tablet, TAKE 1 TABLET BY MOUTH TWICE DAILY, Disp: 180 tablet, Rfl: 2    RSVPreF3 Vac Recomb Adjuvanted (Arexvy) 120 MCG/0.5ML reconstituted suspension injection, Inject 0.5 mL into the appropriate muscle as directed by prescriber 1 (One) Time., Disp: , Rfl:      Objective     History of Present Illness  I have given him betamethasone lotion to use in his hairline on his scalp and his beard for rash that he had and this worked initially but no longer works and his rash is getting worse.    He is here to follow-up on hypertension, hypercholesterolemia.  He is due for colon cancer screening and RSV vaccine.  Hyperlipidemia    Hypertension    Rash         Review of Systems   Constitutional: Negative.    Eyes: Negative.    Respiratory: Negative.     Cardiovascular: Negative.     Gastrointestinal: Negative.    Genitourinary: Negative.    Skin:  Positive for rash.        He is got a rash that is hairline in his scalp and in his beard.   Hematological: Negative.    Psychiatric/Behavioral: Negative.         Physical Exam  Vitals and nursing note reviewed.   Constitutional:       Appearance: Normal appearance. He is obese.      Comments: Pleasant, neatly groomed, no distress.   Cardiovascular:      Rate and Rhythm: Regular rhythm.      Heart sounds: Normal heart sounds. No murmur heard.     No gallop.   Pulmonary:      Effort: No respiratory distress.      Breath sounds: Normal breath sounds. No wheezing or rales.   Skin:     Comments: He has an erythematous rash with some small silver flakes scattered throughout his hairline in his beard.   Neurological:      Mental Status: He is alert and oriented to person, place, and time.   Psychiatric:         Mood and Affect: Mood normal.         Behavior: Behavior normal.         Thought Content: Thought content normal.           Problems Addressed this Visit          Cardiac and Vasculature    Hypertension       Skin    Dandruff in adult       Sleep    Obstructive sleep apnea    Narcolepsy without cataplexy     Other Visit Diagnoses       Colon cancer screening    -  Primary    Need for RSV immunization              Diagnoses         Codes Comments    Colon cancer screening    -  Primary ICD-10-CM: Z12.11  ICD-9-CM: V76.51     Need for RSV immunization     ICD-10-CM: Z29.11  ICD-9-CM: V04.82     Obstructive sleep apnea     ICD-10-CM: G47.33  ICD-9-CM: 327.23     Narcolepsy without cataplexy     ICD-10-CM: G47.419  ICD-9-CM: 347.00     Primary hypertension     ICD-10-CM: I10  ICD-9-CM: 401.9     Dandruff in adult     ICD-10-CM: L21.0  ICD-9-CM: 690.18               PLAN  He is use betamethasone lotion to no avail.  I have referred him to Dr. Giorgio Block regarding his opinion about management of his scalp and beard lesions.    I put an order in for  RSV immunization.    He has obstructive sleep apnea and narcolepsy.  He is compliant with use of CPAP and Adderall.    His hypertension is well-controlled.    I asked him to follow-up in 4 to 6 months.  No follow-ups on file.

## 2024-03-13 RX ORDER — FLUTICASONE PROPIONATE 50 MCG
SPRAY, SUSPENSION (ML) NASAL
Qty: 16 G | Refills: 2 | Status: SHIPPED | OUTPATIENT
Start: 2024-03-13

## 2024-04-04 RX ORDER — METOPROLOL SUCCINATE 25 MG/1
25 TABLET, EXTENDED RELEASE ORAL 2 TIMES DAILY
Qty: 180 TABLET | Refills: 1 | Status: SHIPPED | OUTPATIENT
Start: 2024-04-04

## 2024-04-04 RX ORDER — ROSUVASTATIN CALCIUM 20 MG/1
20 TABLET, COATED ORAL
Qty: 90 TABLET | Refills: 1 | Status: SHIPPED | OUTPATIENT
Start: 2024-04-04

## 2024-04-15 RX ORDER — FLUTICASONE PROPIONATE 50 MCG
SPRAY, SUSPENSION (ML) NASAL
Qty: 48 G | Refills: 2 | Status: SHIPPED | OUTPATIENT
Start: 2024-04-15

## 2024-04-29 ENCOUNTER — OFFICE VISIT (OUTPATIENT)
Dept: SLEEP MEDICINE | Facility: HOSPITAL | Age: 69
End: 2024-04-29
Payer: MEDICARE

## 2024-04-29 VITALS
WEIGHT: 278 LBS | OXYGEN SATURATION: 97 % | BODY MASS INDEX: 37.65 KG/M2 | HEIGHT: 72 IN | DIASTOLIC BLOOD PRESSURE: 84 MMHG | HEART RATE: 65 BPM | SYSTOLIC BLOOD PRESSURE: 153 MMHG

## 2024-04-29 DIAGNOSIS — I48.0 PAROXYSMAL ATRIAL FIBRILLATION: ICD-10-CM

## 2024-04-29 DIAGNOSIS — R40.0 SOMNOLENCE: ICD-10-CM

## 2024-04-29 DIAGNOSIS — E66.09 CLASS 2 OBESITY DUE TO EXCESS CALORIES WITH BODY MASS INDEX (BMI) OF 37.0 TO 37.9 IN ADULT, UNSPECIFIED WHETHER SERIOUS COMORBIDITY PRESENT: ICD-10-CM

## 2024-04-29 DIAGNOSIS — G47.33 OBSTRUCTIVE SLEEP APNEA, ADULT: Primary | ICD-10-CM

## 2024-04-29 DIAGNOSIS — G47.419 NARCOLEPSY WITHOUT CATAPLEXY: ICD-10-CM

## 2024-04-29 PROCEDURE — 3077F SYST BP >= 140 MM HG: CPT | Performed by: PSYCHIATRY & NEUROLOGY

## 2024-04-29 PROCEDURE — 3079F DIAST BP 80-89 MM HG: CPT | Performed by: PSYCHIATRY & NEUROLOGY

## 2024-04-29 PROCEDURE — G0463 HOSPITAL OUTPT CLINIC VISIT: HCPCS

## 2024-04-29 PROCEDURE — 99205 OFFICE O/P NEW HI 60 MIN: CPT | Performed by: PSYCHIATRY & NEUROLOGY

## 2024-04-29 RX ORDER — DEXTROAMPHETAMINE SACCHARATE, AMPHETAMINE ASPARTATE, DEXTROAMPHETAMINE SULFATE AND AMPHETAMINE SULFATE 5; 5; 5; 5 MG/1; MG/1; MG/1; MG/1
20 TABLET ORAL 3 TIMES DAILY
Qty: 270 TABLET | Refills: 0 | Status: SHIPPED | OUTPATIENT
Start: 2024-04-29 | End: 2024-07-28

## 2024-04-29 NOTE — PROGRESS NOTES
Reason for Consultation: Sleep apnea and narcolepsy        Patient Care Team:  Vasyl Urena MD as PCP - General (Internal Medicine)  Ray Pearson MD as Consulting Physician (Pulmonary Disease)  Daria Daniels MD as Cardiologist (Cardiology)  Claudia Mandel MD, MPH as Consulting Physician (Sleep Medicine)      History of present illness:    Thank you for asking me to see your patient.  The patient is a 69 y.o. male presents for evaluation of narcolepsy but unfortunately I do not have any studies confirm diagnosis of narcolepsy.  These were done some years ago.  He has been on stimulant medications primarily Adderall and Vyvanse.  That he was on another medicine but he does not know what that was.  In 2007 he lost a substantial amount of weight and was going to get off his CPAP but he is now on BiPAP.  His machine is a V auto with max IPAP 25 min EPAP of 9 and pressure support of 5.  His residual AHI is 0.9.  It looks as though his median IPAP is 14.1 and median EPAP is 9.1.  He said his machine is about a year old.      [place]: 7    Data Reviewed: Reviewed his medical chart and sleep questionnaire.  We have sleep study that is illegible for the most part.  It concludes that he has HIV 18.4 and I think this is Dr. Pearson who signed it.  Study appears to be from May 2006  Below is note from primary care provider.  Mr. Doll comes in for follow up of DORA and narcolepsy with cataplexy, previously followed by Dr. Velazquez and before that Dr. Pearson. He was initially diagnosed with severe DORA. He also has history of Afib and CVA. He tells me that he also has napping studies but it was under research and isn't documented in Epic. He is managed on Adderall 20 mg tabs, two in the morning and one at noon, and he finds it helpful. He was previously on Wakix but cannot get it because of a previous bill. He works part time from home. He sleeps from 1a till 8a most days. He falls asleep fine at night  as long as he has been awake about 16-17 hours. His download shows great compliance, 100% of the nights over 4 hours. He averages 7 hours nightly with auto BiPAP. Residual index 0.9. Mask and pressure are good. ESS 10. His machine is a ResMed.    The following portions of the patient's history were reviewed and updated as appropriate: allergies, current medications, past medical/surgical history, social history, relevant family history and problem list.    Review of Systems:    Review of Systems   HENT: Negative for congestion.   Eyes: Negative for pain.   Genitourinary: Negative for frequency.     Allergies as of 4/29/2024 Review status set to Review Complete by Deena Ramirez APRN on 4/29/2024   Severity Noted Reaction Type Reactions   Clarithromycin Low 12/14/2015 Other (See Comments)   <paragraph>Unable to eat </paragraph>   Sulfa Antibiotics Low 12/14/2015 Other (See Comments)   Other reaction(s): Muscle Pain/Myalgia  Other reaction(s): Muscle Pain/Myalgia       Current Outpatient Medications:    amphetamine-dextroamphetamine (ADDERALL) 20 MG tablet, Take 1 tablet by mouth as directed 2 tabs AM, 1 tab noon. Max Daily Amount: 60 mg, Disp: 90 tablet, Rfl: 0   azelastine (ASTELIN) 0.1 % nasal spray, nightly . , Disp: , Rfl:    Fluticasone Propionate (FLONASE ALLERGY RELIEF NA), Instill into nose nightly . , Disp: , Rfl:    metoprolol, TOPROL-XL, 25 MG 24 hr tablet, Take 25 mg by mouth 2 (two) times daily . , Disp: , Rfl:    rosuvastatin (CRESTOR) 20 MG tablet, Take 1 tablet by mouth nightly., Disp: 30 tablet, Rfl: 5   timolol (TIMOPTIC) 0.5 % ophthalmic solution, Apply to eye., Disp: , Rfl:    valsartan (DIOVAN) 160 MG tablet, Take 160 mg by mouth nightly . , Disp: , Rfl:    XARELTO 20 MG TABS, Take 1 tablet by mouth daily with dinner., Disp: , Rfl:     Objective:    /70  Pulse 64  Temp 97.6 °F (36.4 °C) (Temporal)  Resp 16  Ht 6' (182.9 cm)  Wt 127.3 kg (280 lb 10.3 oz)  SpO2 97%  BMI 38.06  kg/m²     Physical Exam  Constitutional:   Appearance: He is well-developed.   Comments: Obese.   HENT:   Head: Normocephalic and atraumatic.   Eyes:   Conjunctiva/sclera: Conjunctivae normal.   Pulmonary:   Effort: Pulmonary effort is normal.   Abdominal:   Palpations: Abdomen is soft.   Musculoskeletal:   General: Normal range of motion.   Cervical back: Normal range of motion.   Skin:  General: Skin is warm and dry.   Neurological:   Mental Status: He is alert.   Psychiatric:   Behavior: Behavior normal.     Assessment & Plan:     Jose Ramon was seen today for sleep apnea.    Diagnoses and all orders for this visit:    Obstructive sleep apnea  - Polysomnography 4 or more parameters with CPAP; Future    Excessive daytime sleepiness  - Multiple sleep latency test with CPAP; Future  - Toxicology Screen, Urine; Future    DORA is WELL CONTROLLED with PAP therapy. Compliance is adequate and the patient was advised to continue using faithfully. We again discussed the importance of avoiding sedatives, not driving if drowsy, notifying all providers of the diagnosis, obtaining routine supplies and weight control.    Regarding his diagnosis of narcolepsy with cataplexy, the urine drug screen from November positive for THC. He tells me that he had one gummy from a friend that made him hallucinate and he went to [facility] ED. Records confirm this. Will get MSLT for proper documentation. Pt willing to be off Adderall for at least two weeks prior to testing. Will repeat a urine drug screen today. Pt has list of [facility] labs.    Return in about 3 months (around 7/29/2024) for DORA, EDS, testing results with a physician. He will return sooner if problems arise.    Deena Ramirez, JENNIFER  4/29/2024     PMH:  Past Medical History:   Diagnosis Date    Abnormal EKG     IN PAST    Acute medial meniscus tear of right knee 1/28/2019    Added automatically from request for surgery [id number]  Formatting of this note might be different from the  original. Added automatically from request for surgery [id number]  Formatting of this note might be different from the original. Added automatically from request for surgery [id number]    Allergic     Arthritis     ED (erectile dysfunction)     First degree AV block     GERD (gastroesophageal reflux disease)     Glaucoma     Hypertension     IN PAST  NO MEDS    Kidney stone     Narcolepsy     Paroxysmal atrial fibrillation     PONV (postoperative nausea and vomiting)     1969 AND 1998    PVCs (premature ventricular contractions)     Sleep apnea     Urinary tract infection           Allergies:  Clarithromycin and Sulfa antibiotics     Medication Review:   Current Outpatient Medications on File Prior to Visit   Medication Sig Dispense Refill    azelastine (ASTELIN) 0.1 % nasal spray 2 sprays into the nostril(s) as directed by provider 2 (Two) Times a Day. Use in each nostril as directed 90 mL 3    betamethasone dipropionate 0.05 % lotion Apply  topically to the appropriate area as directed 2 (Two) Times a Day. Apply small amount to scalp once daily. 60 mL 1    fluticasone (FLONASE) 50 MCG/ACT nasal spray USE 2 SPRAYS IN EACH NOSTRIL DAILY AS INSTRUCTED BY PROVIDER 48 g 2    lisdexamfetamine (VYVANSE) 40 MG capsule Take 1 capsule by mouth Daily 30 capsule 1    metoprolol succinate XL (TOPROL-XL) 25 MG 24 hr tablet TAKE 1 TABLET BY MOUTH TWICE DAILY 180 tablet 1    rivaroxaban (Xarelto) 20 MG tablet Take 1 tablet by mouth Daily With Dinner. 90 tablet 3    rosuvastatin (CRESTOR) 20 MG tablet TAKE 1 TABLET BY MOUTH EVERY NIGHT AT BEDTIME 90 tablet 1    timolol (TIMOPTIC) 0.5 % ophthalmic solution Apply  to eye(s) as directed by provider Every 12 (Twelve) Hours.      valsartan (DIOVAN) 160 MG tablet TAKE 1 TABLET BY MOUTH TWICE DAILY 180 tablet 2    [DISCONTINUED] amphetamine-dextroamphetamine (ADDERALL) 20 MG tablet Take 1 tablet by mouth 3 (Three) Times a Day. 2 tabs in am and 1 at lunch       No current facility-administered  "medications on file prior to visit.         Vital Signs:    Vitals:    04/29/24 1500   BP: 153/84   Pulse: 65   SpO2: 97%   Weight: 126 kg (278 lb)   Height: 182.9 cm (72\")        Body mass index is 37.7 kg/m².  Neck Circumference: 20 inches      Physical Exam:    Constitutional:  Well developed 69 y.o. male that appears in no apparent distress.  Awake & oriented times 3.  Normal mood with normal recent and remote memory and normal judgement.  Eyes:  Conjunctivae normal.  Oropharynx: Moist mucous membranes without exudate and Mallampati 4  Neck: Trachea midline  Respiratory: Effort is not labored  Cardiovascular: Radial pulse regular  Musculoskeletal: Gait appears normal, no digital clubbing evident, no pre-tibial edema        Impression:   Encounter Diagnoses   Name Primary?    Obstructive sleep apnea, adult Yes    Class 2 obesity due to excess calories with body mass index (BMI) of 37.0 to 37.9 in adult, unspecified whether serious comorbidity present     Paroxysmal atrial fibrillation     Narcolepsy without cataplexy     Somnolence      Patient's BMI is Body mass index is 37.7 kg/m².    Provide history of hypersomnia that he says is narcolepsy and I checked the PD MD and he has had stimulants written by number of different physicians.  He understands he is going through a drug study for me here today and that if we cannot find his prior diagnostic MSLT that we will have to do another 1.  Try to obtain that for me.    Plan:  Explained to him that I have concerns about him being on stimulant medicine without diagnosis of narcolepsy or idiopathic hypersomnia on an MSLT that I can visualize.  I told him that I would be willing to write his medicine for 3 months as long as we do a drug test today.  If I see him back in 3 months and we do not have a diagnostic study then I will be compelled to repeat 1.  Spent approximately 60 minutes with this patient today.  This was coordinating care and reviewing records and trying " to get records.    The patient should practice good sleep hygiene measures.      weight loss might be beneficial in this patient who has a Body mass index is 37.7 kg/m².      Pathophysiology of DORA described to the patient.  Cardiovascular complications of untreated DORA also reviewed.      The patient was cautioned about the dangers of drowsy driving.    Napoleon Mandel MD  Sleep Medicine  04/29/24  15:56 EDT

## 2024-04-30 ENCOUNTER — LAB (OUTPATIENT)
Dept: LAB | Facility: HOSPITAL | Age: 69
End: 2024-04-30
Payer: MEDICARE

## 2024-04-30 DIAGNOSIS — R40.0 SOMNOLENCE: ICD-10-CM

## 2024-04-30 DIAGNOSIS — G47.419 NARCOLEPSY WITHOUT CATAPLEXY: ICD-10-CM

## 2024-04-30 LAB
AMPHET+METHAMPHET UR QL: POSITIVE
BARBITURATES UR QL SCN: NEGATIVE
BENZODIAZ UR QL SCN: NEGATIVE
CANNABINOIDS SERPL QL: NEGATIVE
COCAINE UR QL: NEGATIVE
FENTANYL UR-MCNC: NEGATIVE NG/ML
METHADONE UR QL SCN: NEGATIVE
OPIATES UR QL: NEGATIVE
OXYCODONE UR QL SCN: NEGATIVE

## 2024-04-30 PROCEDURE — 80307 DRUG TEST PRSMV CHEM ANLYZR: CPT

## 2024-05-30 DIAGNOSIS — R19.5 POSITIVE COLORECTAL CANCER SCREENING USING COLOGUARD TEST: Primary | ICD-10-CM

## 2024-06-24 ENCOUNTER — OFFICE VISIT (OUTPATIENT)
Dept: SURGERY | Facility: CLINIC | Age: 69
End: 2024-06-24
Payer: MEDICARE

## 2024-06-24 VITALS
BODY MASS INDEX: 37.65 KG/M2 | WEIGHT: 278 LBS | SYSTOLIC BLOOD PRESSURE: 130 MMHG | HEIGHT: 72 IN | DIASTOLIC BLOOD PRESSURE: 76 MMHG

## 2024-06-24 DIAGNOSIS — R19.5 POSITIVE COLORECTAL CANCER SCREENING USING DNA-BASED STOOL TEST: Primary | ICD-10-CM

## 2024-06-24 PROCEDURE — 99203 OFFICE O/P NEW LOW 30 MIN: CPT | Performed by: PHYSICIAN ASSISTANT

## 2024-06-24 PROCEDURE — 3078F DIAST BP <80 MM HG: CPT | Performed by: PHYSICIAN ASSISTANT

## 2024-06-24 PROCEDURE — 3075F SYST BP GE 130 - 139MM HG: CPT | Performed by: PHYSICIAN ASSISTANT

## 2024-06-24 PROCEDURE — 1159F MED LIST DOCD IN RCRD: CPT | Performed by: PHYSICIAN ASSISTANT

## 2024-06-24 PROCEDURE — 1160F RVW MEDS BY RX/DR IN RCRD: CPT | Performed by: PHYSICIAN ASSISTANT

## 2024-06-24 RX ORDER — UREA 20 %
CREAM (GRAM) TOPICAL
COMMUNITY
Start: 2024-06-18

## 2024-06-24 RX ORDER — CICLOPIROX 1 G/100ML
SHAMPOO TOPICAL
COMMUNITY
Start: 2024-06-13

## 2024-06-24 RX ORDER — DESONIDE 0.5 MG/G
OINTMENT TOPICAL
COMMUNITY
Start: 2024-06-13

## 2024-06-24 NOTE — H&P (VIEW-ONLY)
ASSESSMENT/PLAN:    This is a 69-year-old gentleman presenting to the office today with a positive Cologuard history.  He is asymptomatic.  With this recent positive test I am recommending proceeding with a colonoscopy for further diagnostic purposes.  Risks and rationale associated with it were discussed with him with risks including but not limited to bleeding, infection, bowel perforation and the need for additional procedures.  He will need to hold his Xarelto for 2 days prior to the procedure.  Any additional follow-up will be discussed with him once the results have been reviewed.  All questions were answered and he was willing to proceed with all recommendations.    CC:    Positive Cologuard test    HPI:    This is a 69-year-old gentleman presenting to the office today at the request of Dr. Vasyl Urena for a recent positive Cologuard test.  He is asymptomatic, denying abdominal pain, distention, changes to their bowel habits, no dark tarry stools, no bright red blood with her bowel movements, no unexpected weight loss or any other complaints.    ENDOSCOPY:   Colonoscopy: 2014 normal    LABS:    Cologuard: Positive    SOCIAL HISTORY:   Denies tobacco use  Occasional alcohol use    FAMILY HISTORY:    Colorectal cancer: None    PREVIOUS ABDOMINAL SURGERY    Appendectomy    OTHER SURGERY  Past Surgical History:   Procedure Laterality Date    ANAL FISTULOTOMY      APPENDECTOMY      BACK SURGERY      CERVICAL    CATARACT EXTRACTION Bilateral     COLONOSCOPY  2014    EYE SURGERY      Cataracts removed from both eyes    KNEE ARTHROSCOPY Right 02/12/2019    Procedure: Knee Arthroscopy with PARTICIAL MEDIAL Menisectomy;  Surgeon: Dwaine Connolly MD;  Location[facility];  Service: Orthopedics    NECK SURGERY      WISDOM TOOTH EXTRACTION         PAST MEDICAL HISTORY:    Past Medical History:   Diagnosis Date    Abnormal EKG     IN PAST    Acute medial meniscus tear of right knee 01/28/2019    Added automatically  from request for surgery [id number]  Formatting of this note might be different from the original. Added automatically from request for surgery [id number]  Formatting of this note might be different from the original. Added automatically from request for surgery [id number]    Allergic     Arthritis     ED (erectile dysfunction)     First degree AV block     GERD (gastroesophageal reflux disease)     Glaucoma     Hypertension     IN PAST  NO MEDS    Kidney stone     Narcolepsy     Paroxysmal atrial fibrillation     PONV (postoperative nausea and vomiting)     1969 AND 1998    PVCs (premature ventricular contractions)     Sleep apnea     Stroke     TIA (transient ischemic attack)     Urinary tract infection        MEDICATIONS:     Current Outpatient Medications:     amphetamine-dextroamphetamine (ADDERALL) 20 MG tablet, Take 1 tablet by mouth 3 (Three) Times a Day for 90 days. 2 tabs in am and 1 at lunch, Disp: 270 tablet, Rfl: 0    azelastine (ASTELIN) 0.1 % nasal spray, 2 sprays into the nostril(s) as directed by provider 2 (Two) Times a Day. Use in each nostril as directed, Disp: 90 mL, Rfl: 3    betamethasone dipropionate 0.05 % lotion, Apply  topically to the appropriate area as directed 2 (Two) Times a Day. Apply small amount to scalp once daily., Disp: 60 mL, Rfl: 1    ciclopirox (LOPROX) 1 % shampoo, USE AS A shampoo three times PER WEEK., Disp: , Rfl:     desonide (DESOWEN) 0.05 % ointment, Apply sparingly two times PER DAY TO THE affected AREAS ON THE FACE AND SCALP., Disp: , Rfl:     fluticasone (FLONASE) 50 MCG/ACT nasal spray, USE 2 SPRAYS IN EACH NOSTRIL DAILY AS INSTRUCTED BY PROVIDER, Disp: 48 g, Rfl: 2    metoprolol succinate XL (TOPROL-XL) 25 MG 24 hr tablet, TAKE 1 TABLET BY MOUTH TWICE DAILY, Disp: 180 tablet, Rfl: 1    rivaroxaban (Xarelto) 20 MG tablet, Take 1 tablet by mouth Daily With Dinner., Disp: 90 tablet, Rfl: 3    rosuvastatin (CRESTOR) 20 MG tablet, TAKE 1 TABLET BY MOUTH EVERY NIGHT AT BEDTIME,  "Disp: 90 tablet, Rfl: 1    timolol (TIMOPTIC) 0.5 % ophthalmic solution, Apply  to eye(s) as directed by provider Every 12 (Twelve) Hours., Disp: , Rfl:     Urea 20 Intensive Hydrating 20 % cream, , Disp: , Rfl:     valsartan (DIOVAN) 160 MG tablet, TAKE 1 TABLET BY MOUTH TWICE DAILY, Disp: 180 tablet, Rfl: 2    ALLERGIES:   Allergies   Allergen Reactions    Clarithromycin Other (See Comments)     Unable to eat     Sulfa Antibiotics Myalgia       PHYSICAL EXAM:   Constitutional: Well-developed well-nourished, no acute distress  Vital signs:   Height 72\"  Weight 278  BMI 37.7  Neck: Supple, no palpable mass, trachea midline  Respiratory: Clear to auscultation, normal inspiratory effort  Cardiovascular: Regular rate, no murmur, no carotid bruit  Gastrointestinal: Soft, nontender  Psychiatric: Alert and oriented ×3, normal affect   Class 2 Severe Obesity (BMI >=35 and <=39.9). Obesity-related health conditions include the following: hypertension. Obesity is unchanged. BMI is is above average; BMI management plan is completed. We discussed portion control and increasing exercise.        Mal Zamora PA-C    [facility] - General Surgery  [address], [address]  [place] [address]    [address], [address]  [place] [address]    Office: [phone]  Fax: [phone]     "

## 2024-06-24 NOTE — PROGRESS NOTES
ASSESSMENT/PLAN:    This is a 69-year-old gentleman presenting to the office today with a positive Cologuard history.  He is asymptomatic.  With this recent positive test I am recommending proceeding with a colonoscopy for further diagnostic purposes.  Risks and rationale associated with it were discussed with him with risks including but not limited to bleeding, infection, bowel perforation and the need for additional procedures.  He will need to hold his Xarelto for 2 days prior to the procedure.  Any additional follow-up will be discussed with him once the results have been reviewed.  All questions were answered and he was willing to proceed with all recommendations.    CC:    Positive Cologuard test    HPI:    This is a 69-year-old gentleman presenting to the office today at the request of Dr. Vasyl Urena for a recent positive Cologuard test.  He is asymptomatic, denying abdominal pain, distention, changes to their bowel habits, no dark tarry stools, no bright red blood with her bowel movements, no unexpected weight loss or any other complaints.    ENDOSCOPY:   Colonoscopy: 2014 normal    LABS:    Cologuard: Positive    SOCIAL HISTORY:   Denies tobacco use  Occasional alcohol use    FAMILY HISTORY:    Colorectal cancer: None    PREVIOUS ABDOMINAL SURGERY    Appendectomy    OTHER SURGERY  Past Surgical History:   Procedure Laterality Date    ANAL FISTULOTOMY      APPENDECTOMY      BACK SURGERY      CERVICAL    CATARACT EXTRACTION Bilateral     COLONOSCOPY  2014    EYE SURGERY      Cataracts removed from both eyes    KNEE ARTHROSCOPY Right 02/12/2019    Procedure: Knee Arthroscopy with PARTICIAL MEDIAL Menisectomy;  Surgeon: Dwaine Connolly MD;  Location: [facility];  Service: Orthopedics    NECK SURGERY      WISDOM TOOTH EXTRACTION         PAST MEDICAL HISTORY:    Past Medical History:   Diagnosis Date    Abnormal EKG     IN PAST    Acute medial meniscus tear of right knee 01/28/2019    Added automatically  from request for surgery [id number]  Formatting of this note might be different from the original. Added automatically from request for surgery [id number]  Formatting of this note might be different from the original. Added automatically from request for surgery [id number]    Allergic     Arthritis     ED (erectile dysfunction)     First degree AV block     GERD (gastroesophageal reflux disease)     Glaucoma     Hypertension     IN PAST  NO MEDS    Kidney stone     Narcolepsy     Paroxysmal atrial fibrillation     PONV (postoperative nausea and vomiting)     1969 AND 1998    PVCs (premature ventricular contractions)     Sleep apnea     Stroke     TIA (transient ischemic attack)     Urinary tract infection        MEDICATIONS:     Current Outpatient Medications:     amphetamine-dextroamphetamine (ADDERALL) 20 MG tablet, Take 1 tablet by mouth 3 (Three) Times a Day for 90 days. 2 tabs in am and 1 at lunch, Disp: 270 tablet, Rfl: 0    azelastine (ASTELIN) 0.1 % nasal spray, 2 sprays into the nostril(s) as directed by provider 2 (Two) Times a Day. Use in each nostril as directed, Disp: 90 mL, Rfl: 3    betamethasone dipropionate 0.05 % lotion, Apply  topically to the appropriate area as directed 2 (Two) Times a Day. Apply small amount to scalp once daily., Disp: 60 mL, Rfl: 1    ciclopirox (LOPROX) 1 % shampoo, USE AS A shampoo three times PER WEEK., Disp: , Rfl:     desonide (DESOWEN) 0.05 % ointment, Apply sparingly two times PER DAY TO THE affected AREAS ON THE FACE AND SCALP., Disp: , Rfl:     fluticasone (FLONASE) 50 MCG/ACT nasal spray, USE 2 SPRAYS IN EACH NOSTRIL DAILY AS INSTRUCTED BY PROVIDER, Disp: 48 g, Rfl: 2    metoprolol succinate XL (TOPROL-XL) 25 MG 24 hr tablet, TAKE 1 TABLET BY MOUTH TWICE DAILY, Disp: 180 tablet, Rfl: 1    rivaroxaban (Xarelto) 20 MG tablet, Take 1 tablet by mouth Daily With Dinner., Disp: 90 tablet, Rfl: 3    rosuvastatin (CRESTOR) 20 MG tablet, TAKE 1 TABLET BY MOUTH EVERY NIGHT AT BEDTIME,  "Disp: 90 tablet, Rfl: 1    timolol (TIMOPTIC) 0.5 % ophthalmic solution, Apply  to eye(s) as directed by provider Every 12 (Twelve) Hours., Disp: , Rfl:     Urea 20 Intensive Hydrating 20 % cream, , Disp: , Rfl:     valsartan (DIOVAN) 160 MG tablet, TAKE 1 TABLET BY MOUTH TWICE DAILY, Disp: 180 tablet, Rfl: 2    ALLERGIES:   Allergies   Allergen Reactions    Clarithromycin Other (See Comments)     Unable to eat     Sulfa Antibiotics Myalgia       PHYSICAL EXAM:   Constitutional: Well-developed well-nourished, no acute distress  Vital signs:   Height 72\"  Weight 278  BMI 37.7  Neck: Supple, no palpable mass, trachea midline  Respiratory: Clear to auscultation, normal inspiratory effort  Cardiovascular: Regular rate, no murmur, no carotid bruit  Gastrointestinal: Soft, nontender  Psychiatric: Alert and oriented ×3, normal affect   Class 2 Severe Obesity (BMI >=35 and <=39.9). Obesity-related health conditions include the following: hypertension. Obesity is unchanged. BMI is is above average; BMI management plan is completed. We discussed portion control and increasing exercise.        Mal Zamora PA-C    [facility] - General Surgery  [address], [address]  [place] [address]    [address], [address]  [place] [address]    Office: [phone]  Fax: [phone]     "

## 2024-07-11 ENCOUNTER — LAB (OUTPATIENT)
Dept: FAMILY MEDICINE CLINIC | Facility: CLINIC | Age: 69
End: 2024-07-11
Payer: MEDICARE

## 2024-07-11 DIAGNOSIS — E78.5 HYPERLIPIDEMIA, UNSPECIFIED HYPERLIPIDEMIA TYPE: ICD-10-CM

## 2024-07-11 DIAGNOSIS — E55.9 VITAMIN D DEFICIENCY: ICD-10-CM

## 2024-07-11 DIAGNOSIS — R73.03 PREDIABETES: Primary | ICD-10-CM

## 2024-07-12 LAB
25(OH)D3+25(OH)D2 SERPL-MCNC: 21.1 NG/ML (ref 30–100)
ALBUMIN SERPL-MCNC: 4.4 G/DL (ref 3.5–5.2)
ALBUMIN/GLOB SERPL: 2 G/DL
ALP SERPL-CCNC: 88 U/L (ref 39–117)
ALT SERPL-CCNC: 36 U/L (ref 1–41)
APPEARANCE UR: CLEAR
AST SERPL-CCNC: 19 U/L (ref 1–40)
BACTERIA #/AREA URNS HPF: NORMAL /HPF
BASOPHILS # BLD AUTO: 0.04 10*3/MM3 (ref 0–0.2)
BASOPHILS NFR BLD AUTO: 0.6 % (ref 0–1.5)
BILIRUB SERPL-MCNC: 0.7 MG/DL (ref 0–1.2)
BILIRUB UR QL STRIP: NEGATIVE
BUN SERPL-MCNC: 8 MG/DL (ref 8–23)
BUN/CREAT SERPL: 9.4 (ref 7–25)
CALCIUM SERPL-MCNC: 9.5 MG/DL (ref 8.6–10.5)
CASTS URNS MICRO: NORMAL
CHLORIDE SERPL-SCNC: 101 MMOL/L (ref 98–107)
CHOLEST SERPL-MCNC: 125 MG/DL (ref 0–200)
CO2 SERPL-SCNC: 27.1 MMOL/L (ref 22–29)
COLOR UR: ABNORMAL
CREAT SERPL-MCNC: 0.85 MG/DL (ref 0.76–1.27)
EGFRCR SERPLBLD CKD-EPI 2021: 95.2 ML/MIN/1.73
EOSINOPHIL # BLD AUTO: 0.15 10*3/MM3 (ref 0–0.4)
EOSINOPHIL NFR BLD AUTO: 2.4 % (ref 0.3–6.2)
EPI CELLS #/AREA URNS HPF: NORMAL /HPF
ERYTHROCYTE [DISTWIDTH] IN BLOOD BY AUTOMATED COUNT: 13.4 % (ref 12.3–15.4)
GLOBULIN SER CALC-MCNC: 2.2 GM/DL
GLUCOSE SERPL-MCNC: 119 MG/DL (ref 65–99)
GLUCOSE UR QL STRIP: NEGATIVE
HBA1C MFR BLD: 7 % (ref 4.8–5.6)
HCT VFR BLD AUTO: 48.7 % (ref 37.5–51)
HDLC SERPL-MCNC: 47 MG/DL (ref 40–60)
HGB BLD-MCNC: 16.3 G/DL (ref 13–17.7)
HGB UR QL STRIP: NEGATIVE
IMM GRANULOCYTES # BLD AUTO: 0.01 10*3/MM3 (ref 0–0.05)
IMM GRANULOCYTES NFR BLD AUTO: 0.2 % (ref 0–0.5)
KETONES UR QL STRIP: ABNORMAL
LDLC SERPL CALC-MCNC: 55 MG/DL (ref 0–100)
LDLC/HDLC SERPL: 1.11 {RATIO}
LEUKOCYTE ESTERASE UR QL STRIP: ABNORMAL
LYMPHOCYTES # BLD AUTO: 1.47 10*3/MM3 (ref 0.7–3.1)
LYMPHOCYTES NFR BLD AUTO: 23.3 % (ref 19.6–45.3)
MCH RBC QN AUTO: 29.6 PG (ref 26.6–33)
MCHC RBC AUTO-ENTMCNC: 33.5 G/DL (ref 31.5–35.7)
MCV RBC AUTO: 88.4 FL (ref 79–97)
MONOCYTES # BLD AUTO: 0.45 10*3/MM3 (ref 0.1–0.9)
MONOCYTES NFR BLD AUTO: 7.1 % (ref 5–12)
NEUTROPHILS # BLD AUTO: 4.19 10*3/MM3 (ref 1.7–7)
NEUTROPHILS NFR BLD AUTO: 66.4 % (ref 42.7–76)
NITRITE UR QL STRIP: NEGATIVE
NRBC BLD AUTO-RTO: 0 /100 WBC (ref 0–0.2)
PH UR STRIP: 6.5 [PH] (ref 5–8)
PLATELET # BLD AUTO: 167 10*3/MM3 (ref 140–450)
POTASSIUM SERPL-SCNC: 4.9 MMOL/L (ref 3.5–5.2)
PROT SERPL-MCNC: 6.6 G/DL (ref 6–8.5)
PROT UR QL STRIP: NEGATIVE
RBC # BLD AUTO: 5.51 10*6/MM3 (ref 4.14–5.8)
RBC #/AREA URNS HPF: NORMAL /HPF
SODIUM SERPL-SCNC: 139 MMOL/L (ref 136–145)
SP GR UR STRIP: 1.02 (ref 1–1.03)
TRIGL SERPL-MCNC: 130 MG/DL (ref 0–150)
UROBILINOGEN UR STRIP-MCNC: ABNORMAL MG/DL
VLDLC SERPL CALC-MCNC: 23 MG/DL (ref 5–40)
WBC # BLD AUTO: 6.31 10*3/MM3 (ref 3.4–10.8)
WBC #/AREA URNS HPF: NORMAL /HPF

## 2024-07-18 ENCOUNTER — OFFICE VISIT (OUTPATIENT)
Dept: FAMILY MEDICINE CLINIC | Facility: CLINIC | Age: 69
End: 2024-07-18
Payer: MEDICARE

## 2024-07-18 VITALS — HEIGHT: 72 IN | BODY MASS INDEX: 37.7 KG/M2

## 2024-07-18 DIAGNOSIS — I48.0 PAROXYSMAL ATRIAL FIBRILLATION: ICD-10-CM

## 2024-07-18 DIAGNOSIS — E11.43 TYPE 2 DIABETES MELLITUS WITH DIABETIC AUTONOMIC NEUROPATHY, WITHOUT LONG-TERM CURRENT USE OF INSULIN: ICD-10-CM

## 2024-07-18 DIAGNOSIS — I10 PRIMARY HYPERTENSION: Primary | ICD-10-CM

## 2024-07-18 PROBLEM — R73.03 PREDIABETES: Status: RESOLVED | Noted: 2019-06-29 | Resolved: 2024-07-18

## 2024-07-18 PROCEDURE — 1126F AMNT PAIN NOTED NONE PRSNT: CPT | Performed by: INTERNAL MEDICINE

## 2024-07-18 PROCEDURE — 99214 OFFICE O/P EST MOD 30 MIN: CPT | Performed by: INTERNAL MEDICINE

## 2024-07-18 NOTE — PROGRESS NOTES
Subjective   Jose Ramon Reardon is a 69 y.o. male. Patient is here today for   Chief Complaint   Patient presents with    Hypertension        History of Present Illness  History of Present Illness  The patient presents for evaluation of high blood pressure and lab work.    The patient underwent a colonoscopy on Monday due to a positive Cologuard result. He has been experiencing mild fatigue and has reduced his Adderall dosage from 2 tablets in the morning and 1 at lunch to manage narcolepsy. This adjustment has resulted in improved sleep quality, however, the fatigue persists. He has transitioned to a new sleep specialist, Dr. Napoleon Soto. He has been self-administering Zyrtec at bedtime, which he suspects may be elevating his blood pressure. However, his blood pressure readings at home are within normal limits. Approximately 2 months ago, he began taking Humira, which has alleviated his generalized body pain, particularly in his feet. He has also taken glucosamine in the past. He experienced a stroke in 2021, which was subsequently diagnosed with arrhythmia two months later. He occasionally experiences word slurring when fatigued. He does not utilize a cane for mobility. He underwent a meniscus repair two years ago.      There were no vitals filed for this visit.  Body mass index is 37.7 kg/m².    Past Medical History:   Diagnosis Date    Abnormal EKG     IN PAST    Acute medial meniscus tear of right knee 01/28/2019    Added automatically from request for surgery [id number]  Formatting of this note might be different from the original. Added automatically from request for surgery [id number]  Formatting of this note might be different from the original. Added automatically from request for surgery [id number]    Allergic     Arthritis     ED (erectile dysfunction)     First degree AV block     GERD (gastroesophageal reflux disease)     Glaucoma     Hypertension     IN PAST  NO MEDS    Kidney stone     Narcolepsy      Paroxysmal atrial fibrillation     PONV (postoperative nausea and vomiting)     1969 AND 1998    PVCs (premature ventricular contractions)     Sleep apnea     Stroke     TIA (transient ischemic attack)     Urinary tract infection       Allergies   Allergen Reactions    Clarithromycin Other (See Comments)     Unable to eat     Sulfa Antibiotics Myalgia      Social History     Socioeconomic History    Marital status:    Tobacco Use    Smoking status: Never     Passive exposure: Never    Smokeless tobacco: Former     Types: Chew     Quit date: 2020    Tobacco comments:     Caffeine use: daily   Vaping Use    Vaping status: Never Used   Substance and Sexual Activity    Alcohol use: Yes     Alcohol/week: 0.0 - 2.0 standard drinks of alcohol    Drug use: Never    Sexual activity: Yes     Partners: Female     Birth control/protection: None, Hysterectomy        Current Outpatient Medications:     amphetamine-dextroamphetamine (ADDERALL) 20 MG tablet, Take 1 tablet by mouth 3 (Three) Times a Day for 90 days. 2 tabs in am and 1 at lunch, Disp: 270 tablet, Rfl: 0    azelastine (ASTELIN) 0.1 % nasal spray, 2 sprays into the nostril(s) as directed by provider 2 (Two) Times a Day. Use in each nostril as directed, Disp: 90 mL, Rfl: 3    betamethasone dipropionate 0.05 % lotion, Apply  topically to the appropriate area as directed 2 (Two) Times a Day. Apply small amount to scalp once daily., Disp: 60 mL, Rfl: 1    ciclopirox (LOPROX) 1 % shampoo, USE AS A shampoo three times PER WEEK., Disp: , Rfl:     desonide (DESOWEN) 0.05 % ointment, Apply sparingly two times PER DAY TO THE affected AREAS ON THE FACE AND SCALP., Disp: , Rfl:     fluticasone (FLONASE) 50 MCG/ACT nasal spray, USE 2 SPRAYS IN EACH NOSTRIL DAILY AS INSTRUCTED BY PROVIDER, Disp: 48 g, Rfl: 2    metoprolol succinate XL (TOPROL-XL) 25 MG 24 hr tablet, TAKE 1 TABLET BY MOUTH TWICE DAILY, Disp: 180 tablet, Rfl: 1    rivaroxaban (Xarelto) 20 MG tablet, Take 1  tablet by mouth Daily With Dinner., Disp: 90 tablet, Rfl: 3    rosuvastatin (CRESTOR) 20 MG tablet, TAKE 1 TABLET BY MOUTH EVERY NIGHT AT BEDTIME, Disp: 90 tablet, Rfl: 1    timolol (TIMOPTIC) 0.5 % ophthalmic solution, Apply  to eye(s) as directed by provider Every 12 (Twelve) Hours., Disp: , Rfl:     Urea 20 Intensive Hydrating 20 % cream, , Disp: , Rfl:     valsartan (DIOVAN) 160 MG tablet, TAKE 1 TABLET BY MOUTH TWICE DAILY, Disp: 180 tablet, Rfl: 2     Objective     Review of Systems   Constitutional: Negative.    HENT: Negative.     Respiratory: Negative.     Cardiovascular: Negative.    Musculoskeletal: Negative.    Psychiatric/Behavioral: Negative.         Physical Exam  Vitals and nursing note reviewed.   Cardiovascular:      Rate and Rhythm: Regular rhythm.      Heart sounds: Normal heart sounds.   Neurological:      Mental Status: He is oriented to person, place, and time.   Psychiatric:         Mood and Affect: Mood normal.         Behavior: Behavior normal.         Thought Content: Thought content normal.       Physical Exam  No carotid bruits in the neck.  Lungs are normal.  Heart is normal. No murmurs.    Results  Laboratory Studies  Hemoglobin A1c was 7%. LDL cholesterol was 55. HDL cholesterol was more than 40. Vitamin D level was 21. Urine test showed a couple of white blood cells and ketones.    Testing  Cologuard test was positive.    Assessment & Plan    Problems Addressed this Visit          Cardiac and Vasculature    Hypertension - Primary    Paroxysmal atrial fibrillation       Endocrine and Metabolic    Type 2 diabetes mellitus with diabetic autonomic neuropathy, without long-term current use of insulin     Diagnoses         Codes Comments    Primary hypertension    -  Primary ICD-10-CM: I10  ICD-9-CM: 401.9     Paroxysmal atrial fibrillation     ICD-10-CM: I48.0  ICD-9-CM: 427.31     Type 2 diabetes mellitus with diabetic autonomic neuropathy, without long-term current use of insulin      ICD-10-CM: E11.43  ICD-9-CM: 250.60, 337.1           Assessment & Plan  1. Health maintenance.  The patient is scheduled for a colonoscopy.    His hypertension is well-controlled.    He now has type 2 diabetes unequivocally with a hemoglobin A1c of 7%.  His fasting glucose was 119.    Continues to make a difference his type 2 diabetes with exercise and dietary management.  He intends to make some needed changes to his diet.  He is familiar with the sort of diet he should be following.  He declined referral to see a dietitian for the time being.    I asked him to follow-up in about 3 months.  Fasting labs prior to that visit should include: Lipid profile, comprehensive metabolic panel, hemoglobin A1c and a urine microalbumin.    Follow-up  A follow-up appointment is scheduled for 3 months from now.      No follow-ups on file.    Patient or patient representative verbalized consent for the use of Ambient Listening during the visit with  Vasyl Urena MD for chart documentation. 7/18/2024  15:02 EDT  Answers submitted by the patient for this visit:  Primary Reason for Visit (Submitted on 7/17/2024)  What is the primary reason for your visit?: Other  Other (Submitted on 7/17/2024)  Please describe your symptoms.: Annual checkup  Have you had these symptoms before?: No  How long have you been having these symptoms?: Greater than 2 weeks  Please list any medications you are currently taking for this condition.: N/A  Please describe any probable cause for these symptoms. : N/A

## 2024-07-22 RX ORDER — CETIRIZINE HYDROCHLORIDE 10 MG/1
10 TABLET ORAL DAILY
COMMUNITY

## 2024-07-23 ENCOUNTER — ANESTHESIA (OUTPATIENT)
Dept: GASTROENTEROLOGY | Facility: HOSPITAL | Age: 69
End: 2024-07-23
Payer: MEDICARE

## 2024-07-23 ENCOUNTER — ANESTHESIA EVENT (OUTPATIENT)
Dept: GASTROENTEROLOGY | Facility: HOSPITAL | Age: 69
End: 2024-07-23
Payer: MEDICARE

## 2024-07-23 ENCOUNTER — HOSPITAL ENCOUNTER (OUTPATIENT)
Facility: HOSPITAL | Age: 69
Setting detail: HOSPITAL OUTPATIENT SURGERY
Discharge: HOME | End: 2024-07-23
Attending: SURGERY | Admitting: SURGERY
Payer: MEDICARE

## 2024-07-23 VITALS
SYSTOLIC BLOOD PRESSURE: 142 MMHG | RESPIRATION RATE: 16 BRPM | DIASTOLIC BLOOD PRESSURE: 91 MMHG | HEIGHT: 72 IN | HEART RATE: 57 BPM | BODY MASS INDEX: 36.84 KG/M2 | OXYGEN SATURATION: 92 % | WEIGHT: 272 LBS

## 2024-07-23 DIAGNOSIS — R19.5 POSITIVE COLORECTAL CANCER SCREENING USING DNA-BASED STOOL TEST: ICD-10-CM

## 2024-07-23 PROCEDURE — 25810000003 LACTATED RINGERS PER 1000 ML

## 2024-07-23 PROCEDURE — 25010000002 PROPOFOL 1000 MG/100ML EMULSION

## 2024-07-23 PROCEDURE — 88305 TISSUE EXAM BY PATHOLOGIST: CPT | Performed by: SURGERY

## 2024-07-23 PROCEDURE — 25810000003 LACTATED RINGERS PER 1000 ML: Performed by: SURGERY

## 2024-07-23 PROCEDURE — 45385 COLONOSCOPY W/LESION REMOVAL: CPT | Performed by: SURGERY

## 2024-07-23 PROCEDURE — 25010000002 PROPOFOL 200 MG/20ML EMULSION

## 2024-07-23 RX ORDER — PROPOFOL 10 MG/ML
INJECTION, EMULSION INTRAVENOUS AS NEEDED
Status: DISCONTINUED | OUTPATIENT
Start: 2024-07-23 | End: 2024-07-23 | Stop reason: SURG

## 2024-07-23 RX ORDER — PROPOFOL 10 MG/ML
INJECTION, EMULSION INTRAVENOUS CONTINUOUS PRN
Status: DISCONTINUED | OUTPATIENT
Start: 2024-07-23 | End: 2024-07-23 | Stop reason: SURG

## 2024-07-23 RX ORDER — SODIUM CHLORIDE, SODIUM LACTATE, POTASSIUM CHLORIDE, CALCIUM CHLORIDE 600; 310; 30; 20 MG/100ML; MG/100ML; MG/100ML; MG/100ML
30 INJECTION, SOLUTION INTRAVENOUS CONTINUOUS PRN
Status: DISCONTINUED | OUTPATIENT
Start: 2024-07-23 | End: 2024-07-23 | Stop reason: HOSPADM

## 2024-07-23 RX ORDER — LIDOCAINE HYDROCHLORIDE 20 MG/ML
INJECTION, SOLUTION INFILTRATION; PERINEURAL AS NEEDED
Status: DISCONTINUED | OUTPATIENT
Start: 2024-07-23 | End: 2024-07-23 | Stop reason: SURG

## 2024-07-23 RX ORDER — SODIUM CHLORIDE, SODIUM LACTATE, POTASSIUM CHLORIDE, CALCIUM CHLORIDE 600; 310; 30; 20 MG/100ML; MG/100ML; MG/100ML; MG/100ML
INJECTION, SOLUTION INTRAVENOUS CONTINUOUS PRN
Status: DISCONTINUED | OUTPATIENT
Start: 2024-07-23 | End: 2024-07-23 | Stop reason: SURG

## 2024-07-23 RX ADMIN — SODIUM CHLORIDE, POTASSIUM CHLORIDE, SODIUM LACTATE AND CALCIUM CHLORIDE: 600; 310; 30; 20 INJECTION, SOLUTION INTRAVENOUS at 13:33

## 2024-07-23 RX ADMIN — LIDOCAINE HYDROCHLORIDE 60 MG: 20 INJECTION, SOLUTION INFILTRATION; PERINEURAL at 13:38

## 2024-07-23 RX ADMIN — SODIUM CHLORIDE, POTASSIUM CHLORIDE, SODIUM LACTATE AND CALCIUM CHLORIDE 30 ML/HR: 600; 310; 30; 20 INJECTION, SOLUTION INTRAVENOUS at 13:20

## 2024-07-23 RX ADMIN — PROPOFOL INJECTABLE EMULSION 100 MG: 10 INJECTION, EMULSION INTRAVENOUS at 13:38

## 2024-07-23 RX ADMIN — PROPOFOL 160 MCG/KG/MIN: 10 INJECTION, EMULSION INTRAVENOUS at 13:38

## 2024-07-23 NOTE — OP NOTE
Colonoscopy Procedure Note  Jose Ramon Reardon  1955  Date of Procedure: 07/23/24    Pre-operative Diagnosis:    Positive colorectal cancer screening with Cologuard    Post-operative Diagnosis:  Transverse colon polyp x 2, transverse, descending, sigmoid diverticulosis    Procedure: Colonoscopy to terminal ileum with hot snare biopsy of transverse colon polyp x 2    Findings/Treatments:   6 mm sessile transverse colon polyp-resection and retrieval complete  8 millimeter pedunculated transverse colon polyp-resection and retrieval complete       Recommendations:   Follow-up results of pathology.  Recommend high-fiber diet for diverticulosis.  The office will call within the next 3-10 days with a final recommendation.    Surgeon: Dawit Sarmiento MD    Anesthetic: MAC per Dwaine Alvarado MD      Procedure Details:    MAC anesthesia was induced.  A digital rectal exam was performed along with visual inspection of the anus. The 180 Colonoscope was inserted into the rectum and advanced to the cecum, with relative ease.  Cecum was identified by the appendiceal orifice and the ileocecal valve and photographed for documentation.  Distal terminal ileum was briefly intubated and was normal in appearance on brief examination.     A careful inspection was made as the scope was withdrawn, including a retroflexed view of the rectum.  In the transverse colon there was a 6 mm sessile polyp which was removed in its entirety with the hot snare.  Settings on the cautery were 0 and 11.  Retrieval was completed via suction through the scope.  Also in the transverse colon but centimeters distal there was an 8 mm pedunculated polyp that was removed in its entirety with a hot snare.  The same settings were used and retrieval was completed via suction through the scope.  There were multiple small and medium size diverticula present throughout the transverse, descending, sigmoid colon. Retroflexion in the rectum revealed no  abnormalities. Prep quality was excellent.      Dawit Sarmienot M.D.  General, Endoscopic, and Robotic Surgery  [facility]    [address], [address]  [place], [place], [address]  P: [phone]  F: [phone]

## 2024-07-23 NOTE — DISCHARGE INSTRUCTIONS
For the next 24 hours patient needs to be with a responsible adult.    For 24 hours DO NOT drive, operate machinery, appliances, drink alcohol, make important decisions or sign legal documents.    Start with a light or bland diet if you are feeling sick to your stomach otherwise advance to regular diet as tolerated.    Follow recommendations on procedure report if provided by your doctor.    Call Dr Sarmiento for problems [phone]    Problems may include but not limited to: large amounts of bleeding, trouble breathing, repeated vomiting, severe unrelieved pain, fever or chills.      Okay to resume Xarelto in the evening on 7/24/2024.

## 2024-07-26 LAB
LAB AP CASE REPORT: NORMAL
PATH REPORT.FINAL DX SPEC: NORMAL
PATH REPORT.GROSS SPEC: NORMAL

## 2024-07-29 ENCOUNTER — OFFICE VISIT (OUTPATIENT)
Dept: SLEEP MEDICINE | Facility: HOSPITAL | Age: 69
End: 2024-07-29
Payer: MEDICARE

## 2024-07-29 VITALS — BODY MASS INDEX: 37.65 KG/M2 | OXYGEN SATURATION: 94 % | WEIGHT: 278 LBS | HEIGHT: 72 IN | HEART RATE: 71 BPM

## 2024-07-29 DIAGNOSIS — E66.09 CLASS 2 OBESITY DUE TO EXCESS CALORIES WITHOUT SERIOUS COMORBIDITY WITH BODY MASS INDEX (BMI) OF 37.0 TO 37.9 IN ADULT: ICD-10-CM

## 2024-07-29 DIAGNOSIS — G47.33 OBSTRUCTIVE SLEEP APNEA: ICD-10-CM

## 2024-07-29 DIAGNOSIS — G47.419 NARCOLEPSY WITHOUT CATAPLEXY: Primary | ICD-10-CM

## 2024-07-29 PROBLEM — E66.812 CLASS 2 OBESITY DUE TO EXCESS CALORIES WITHOUT SERIOUS COMORBIDITY WITH BODY MASS INDEX (BMI) OF 37.0 TO 37.9 IN ADULT: Status: ACTIVE | Noted: 2024-07-29

## 2024-07-29 PROCEDURE — 1160F RVW MEDS BY RX/DR IN RCRD: CPT | Performed by: PSYCHIATRY & NEUROLOGY

## 2024-07-29 PROCEDURE — 99214 OFFICE O/P EST MOD 30 MIN: CPT | Performed by: PSYCHIATRY & NEUROLOGY

## 2024-07-29 PROCEDURE — G0463 HOSPITAL OUTPT CLINIC VISIT: HCPCS

## 2024-07-29 PROCEDURE — 1159F MED LIST DOCD IN RCRD: CPT | Performed by: PSYCHIATRY & NEUROLOGY

## 2024-07-29 NOTE — PROGRESS NOTES
"Follow Up Sleep Disorders Center Note     Chief Complaint: Hypersomnia and sleep apnea.    Primary Care Physician: Vasyl Urena MD    Interval History:   The patient is a 69 y.o. male  who who was diagnosed with a sleep study as having narcolepsy without cataplexy at an outside facility, returns for follow-up on his BiPAP therapy.  He is on auto the BiPAP via auto.  He says he is doing well has no complaints.  He did a drug test after last visit which did show amphetamines in his urine which is appropriate since he is prescribed that for his narcolepsy.  He previously had some difficulty getting to sleep but he cut his afternoon dose of RERA in half and now he does not have any difficulty initiating sleep.    Downloaded PAP Data Evaluated For Therapeutic Response and Compliance:  DME is [facility].  downloads between 4/27 and 7/25/2024.  Average usage is 100% of the last 90 days and for an average of 6 hours 48 minutes.  Average AHI is 0.7.  PAP pressure is median IPAP of 14 mean EPAP of 9.0    Review of Systems:    A complete review of systems was done and all were negative with the exception of 8    Social History:    Social History     Socioeconomic History    Marital status:    Tobacco Use    Smoking status: Never     Passive exposure: Never    Smokeless tobacco: Former     Types: Chew     Quit date: 2020    Tobacco comments:     Caffeine use: daily   Vaping Use    Vaping status: Never Used   Substance and Sexual Activity    Alcohol use: Yes     Alcohol/week: 0.0 - 2.0 standard drinks of alcohol    Drug use: Never    Sexual activity: Yes     Partners: Female     Birth control/protection: None, Hysterectomy       Allergies:  Clarithromycin and Sulfa antibiotics     Medication Review:  Reviewed.      Vital Signs:    Vitals:    07/29/24 1100   Pulse: 71   SpO2: 94%   Weight: 126 kg (278 lb)   Height: 182.9 cm (72.01\")     Body mass index is 37.7 kg/m².  .BMIFOLLOWUP    Physical Exam:    Constitutional: "  Well developed 69 y.o. male that appears in no apparent distress.  Awake & oriented times 3.  Normal mood with normal recent and remote memory and normal judgement.  Eyes:  Conjunctivae normal.      Self-administered [place] Sleepiness Scale test results: 8  0-5 Lower normal daytime sleepiness  6-10 Higher normal daytime sleepiness  11-12 Mild, 13-15 Moderate, & 16-24 Severe excessive daytime sleepiness       I have reviewed the above results and compared them with the patient's last downloads and reviewed with the patient.    Impression:     Encounter Diagnoses   Name Primary?    Narcolepsy without cataplexy Yes    Obstructive sleep apnea     Class 2 obesity due to excess calories without serious comorbidity with body mass index (BMI) of 37.0 to 37.9 in adult          Plan:  Good sleep hygiene measures should be maintained.  Weight loss would be beneficial in this patient who obesity class II by Body mass index is 37.7 kg/m².      After evaluating the patient and assessing results available, the patient is benefiting from the treatment being provided.     The patient will continue auto.  Potential side effects of PAP therapy reviewed and addressed as needed.  After clinical evaluation and review of downloads, I recommend no changes to the patient's pressures.      I answered all of the patient's questions.  The patient will call the [facility] Disorder Center for any problems and will follow 6 months.      Napoleon Mandel MD  Sleep Medicine  07/29/24  12:00 EDT

## 2024-07-31 ENCOUNTER — TELEPHONE (OUTPATIENT)
Dept: ORTHOPEDIC SURGERY | Facility: CLINIC | Age: 69
End: 2024-07-31

## 2024-07-31 NOTE — TELEPHONE ENCOUNTER
Called patient [phone] explained the Complex Care Management program to the patient . She accepted the program.     Schedule her with Provider Cece Bridges on July 18 at 1100am . Provided office number    Caller: Jose Ramon Reardon    Relationship to patient: Self    Best call back number: [phone]    Patient is needing: PATIENT WOULD LIKE TO KNOW WHO DR. BURTON WOULD SUGGEST FOR HIP PAIN - PLEASE REACH OUT AND ADVISE

## 2024-08-15 RX ORDER — AZELASTINE 1 MG/ML
2 SPRAY, METERED NASAL 2 TIMES DAILY
Qty: 90 ML | Refills: 0 | Status: SHIPPED | OUTPATIENT
Start: 2024-08-15

## 2024-08-15 RX ORDER — VALSARTAN 160 MG/1
160 TABLET ORAL 2 TIMES DAILY
Qty: 180 TABLET | Refills: 2 | Status: SHIPPED | OUTPATIENT
Start: 2024-08-15

## 2024-08-20 RX ORDER — CETIRIZINE HYDROCHLORIDE 10 MG/1
10 TABLET ORAL DAILY
Qty: 30 TABLET | Refills: 1 | Status: SHIPPED | OUTPATIENT
Start: 2024-08-20

## 2024-08-20 NOTE — TELEPHONE ENCOUNTER
Caller: Jose Ramon Reardon    Relationship: Self    Best call back number: [phone]     Requested Prescriptions:   Requested Prescriptions     Pending Prescriptions Disp Refills    cetirizine (zyrTEC) 10 MG tablet       Sig: Take 1 tablet by mouth Daily.        Pharm[facility] where request [facility] be sent[id number] [address] [facility] [phone] [facility] [phone] FX     Last office visit with prescribing clinician: 7/18/2024   Last telemedicine visit with prescribing clinician: Visit date not found   Next office visit with prescribing clinician: 11/21/2024     Additional details provided by patient: LESS THAN 3 DAYS LEFT    Does the patient have less than a 3 day supply:  [x] Yes  [] No    Would you like a call back once the refill request has been completed: [] Yes [x] No    If the office needs to give you a call back, can they leave a voicemail: [] Yes [x] No    Rico Barcenas Rep   08/20/24 11:27 EDT

## 2024-08-26 ENCOUNTER — OFFICE VISIT (OUTPATIENT)
Dept: ORTHOPEDIC SURGERY | Facility: CLINIC | Age: 69
End: 2024-08-26
Payer: MEDICARE

## 2024-08-26 VITALS — BODY MASS INDEX: 36.34 KG/M2 | HEIGHT: 72 IN | TEMPERATURE: 98.6 F | WEIGHT: 268.3 LBS

## 2024-08-26 DIAGNOSIS — M25.561 RIGHT KNEE PAIN, UNSPECIFIED CHRONICITY: Primary | ICD-10-CM

## 2024-08-26 DIAGNOSIS — E66.09 CLASS 2 OBESITY DUE TO EXCESS CALORIES WITH BODY MASS INDEX (BMI) OF 37.0 TO 37.9 IN ADULT, UNSPECIFIED WHETHER SERIOUS COMORBIDITY PRESENT: ICD-10-CM

## 2024-08-26 DIAGNOSIS — G47.33 OBSTRUCTIVE SLEEP APNEA, ADULT: ICD-10-CM

## 2024-08-26 DIAGNOSIS — M17.10 ARTHRITIS OF KNEE: ICD-10-CM

## 2024-08-26 DIAGNOSIS — I48.0 PAROXYSMAL ATRIAL FIBRILLATION: ICD-10-CM

## 2024-08-26 DIAGNOSIS — G47.419 NARCOLEPSY WITHOUT CATAPLEXY: Primary | ICD-10-CM

## 2024-08-26 PROCEDURE — 1160F RVW MEDS BY RX/DR IN RCRD: CPT | Performed by: ORTHOPAEDIC SURGERY

## 2024-08-26 PROCEDURE — 99213 OFFICE O/P EST LOW 20 MIN: CPT | Performed by: ORTHOPAEDIC SURGERY

## 2024-08-26 PROCEDURE — 1159F MED LIST DOCD IN RCRD: CPT | Performed by: ORTHOPAEDIC SURGERY

## 2024-08-26 PROCEDURE — 73562 X-RAY EXAM OF KNEE 3: CPT | Performed by: ORTHOPAEDIC SURGERY

## 2024-08-26 RX ORDER — DEXTROAMPHETAMINE SACCHARATE, AMPHETAMINE ASPARTATE, DEXTROAMPHETAMINE SULFATE AND AMPHETAMINE SULFATE 5; 5; 5; 5 MG/1; MG/1; MG/1; MG/1
20 TABLET ORAL DAILY
Qty: 90 TABLET | Refills: 0 | Status: SHIPPED | OUTPATIENT
Start: 2024-08-26 | End: 2024-08-27 | Stop reason: SDUPTHER

## 2024-08-26 RX ORDER — DEXTROAMPHETAMINE SACCHARATE, AMPHETAMINE ASPARTATE, DEXTROAMPHETAMINE SULFATE AND AMPHETAMINE SULFATE 5; 5; 5; 5 MG/1; MG/1; MG/1; MG/1
20 TABLET ORAL 3 TIMES DAILY
Qty: 270 TABLET | Refills: 0 | OUTPATIENT
Start: 2024-08-26 | End: 2024-11-24

## 2024-08-26 NOTE — PROGRESS NOTES
"Chief Complaint:  Worsening right knee pain    HPI:  Mr. Reardon is seen today for his right knee.  I last saw him for this issue several years ago.  He says the knee pain seems to be slowly progressing.  His primary concern today and coming in was he wanted to make sure that the problem did not get \"out of hand\".  Pain is mild and aching.  Most of the pain seems to be medial.  No mechanical symptoms.  He says that he is still able to be active and do most things that he wants to do on a daily basis.    Exam:  Right knee is examined.  Skin is benign.  No atrophy, swellings, or masses.  Focal tenderness along the medial joint line.  There is a trace effusion.  Knee motion: 0-120.  No instability.  Good strength with hip flexion, knee extension, ankle and great toe plantar flexion and dorsiflexion.  Sensation is intact distally.  Brisk capillary refill in the toes.  Palpable pedal pulses.  Good skin turgor.     Imaging:  Bilateral standing AP views, bilateral merchants views and a lateral view of the right knee are ordered by myself and reviewed to evaluate the patient's complaint.  No comparison films are immediately available.  The x-rays show significant degenerative arthritis including joint space narrowing, osteophyte formation, and subchondral sclerosis.  The majority of the degenerative changes appear to involve the medial compartment.     Assessment:  Worsening right knee osteoarthritis    Plan: I showed him the x-rays.  We discussed the findings in detail.  I offered him an injection.  He says he does not feel like the pain is bad enough to justify an injection.  We talked about other conservative treatment measures as well.  For now, he feels like he can live with it as is.  He will call me if he changes his mind.    Dwaine Connolly MD  08/26/2024    "

## 2024-08-27 DIAGNOSIS — G47.419 NARCOLEPSY WITHOUT CATAPLEXY: ICD-10-CM

## 2024-08-27 RX ORDER — DEXTROAMPHETAMINE SACCHARATE, AMPHETAMINE ASPARTATE, DEXTROAMPHETAMINE SULFATE AND AMPHETAMINE SULFATE 5; 5; 5; 5 MG/1; MG/1; MG/1; MG/1
20 TABLET ORAL DAILY
Qty: 90 TABLET | Refills: 0 | Status: SHIPPED | OUTPATIENT
Start: 2024-08-27 | End: 2024-11-25

## 2024-10-03 RX ORDER — ROSUVASTATIN CALCIUM 20 MG/1
20 TABLET, COATED ORAL
Qty: 90 TABLET | Refills: 1 | Status: SHIPPED | OUTPATIENT
Start: 2024-10-03

## 2024-10-08 DIAGNOSIS — G47.419 NARCOLEPSY WITHOUT CATAPLEXY: ICD-10-CM

## 2024-10-08 RX ORDER — DEXTROAMPHETAMINE SACCHARATE, AMPHETAMINE ASPARTATE, DEXTROAMPHETAMINE SULFATE AND AMPHETAMINE SULFATE 5; 5; 5; 5 MG/1; MG/1; MG/1; MG/1
20 TABLET ORAL DAILY
Qty: 90 TABLET | Refills: 0 | Status: SHIPPED | OUTPATIENT
Start: 2024-10-08 | End: 2024-10-11

## 2024-10-11 ENCOUNTER — DOCUMENTATION (OUTPATIENT)
Dept: SLEEP MEDICINE | Facility: HOSPITAL | Age: 69
End: 2024-10-11
Payer: MEDICARE

## 2024-10-11 DIAGNOSIS — G47.419 NARCOLEPSY WITHOUT CATAPLEXY: Primary | ICD-10-CM

## 2024-10-11 DIAGNOSIS — G47.419 NARCOLEPSY WITHOUT CATAPLEXY: ICD-10-CM

## 2024-10-11 RX ORDER — DEXTROAMPHETAMINE SACCHARATE, AMPHETAMINE ASPARTATE, DEXTROAMPHETAMINE SULFATE AND AMPHETAMINE SULFATE 5; 5; 5; 5 MG/1; MG/1; MG/1; MG/1
TABLET ORAL
Qty: 225 TABLET | Refills: 0 | Status: SHIPPED | OUTPATIENT
Start: 2024-10-11 | End: 2024-10-11 | Stop reason: SDUPTHER

## 2024-10-11 RX ORDER — DEXTROAMPHETAMINE SACCHARATE, AMPHETAMINE ASPARTATE, DEXTROAMPHETAMINE SULFATE AND AMPHETAMINE SULFATE 5; 5; 5; 5 MG/1; MG/1; MG/1; MG/1
TABLET ORAL
Qty: 225 TABLET | Refills: 0 | Status: SHIPPED | OUTPATIENT
Start: 2024-10-11

## 2024-10-28 ENCOUNTER — OFFICE VISIT (OUTPATIENT)
Dept: CARDIOLOGY | Facility: CLINIC | Age: 69
End: 2024-10-28
Payer: MEDICARE

## 2024-10-28 VITALS
HEIGHT: 72 IN | DIASTOLIC BLOOD PRESSURE: 74 MMHG | SYSTOLIC BLOOD PRESSURE: 120 MMHG | BODY MASS INDEX: 37.19 KG/M2 | HEART RATE: 67 BPM | WEIGHT: 274.6 LBS

## 2024-10-28 DIAGNOSIS — E11.43 TYPE 2 DIABETES MELLITUS WITH DIABETIC AUTONOMIC NEUROPATHY, WITHOUT LONG-TERM CURRENT USE OF INSULIN: ICD-10-CM

## 2024-10-28 DIAGNOSIS — I10 PRIMARY HYPERTENSION: ICD-10-CM

## 2024-10-28 DIAGNOSIS — I48.0 PAROXYSMAL ATRIAL FIBRILLATION: Primary | ICD-10-CM

## 2024-10-28 DIAGNOSIS — Z86.73 HISTORY OF CVA (CEREBROVASCULAR ACCIDENT): ICD-10-CM

## 2024-10-28 DIAGNOSIS — I49.3 PVC'S (PREMATURE VENTRICULAR CONTRACTIONS): ICD-10-CM

## 2024-10-28 DIAGNOSIS — G47.33 OBSTRUCTIVE SLEEP APNEA: ICD-10-CM

## 2024-10-28 PROCEDURE — 93000 ELECTROCARDIOGRAM COMPLETE: CPT | Performed by: NURSE PRACTITIONER

## 2024-10-28 PROCEDURE — 99214 OFFICE O/P EST MOD 30 MIN: CPT | Performed by: NURSE PRACTITIONER

## 2024-10-28 PROCEDURE — 3078F DIAST BP <80 MM HG: CPT | Performed by: NURSE PRACTITIONER

## 2024-10-28 PROCEDURE — 3074F SYST BP LT 130 MM HG: CPT | Performed by: NURSE PRACTITIONER

## 2024-10-28 RX ORDER — SILDENAFIL CITRATE 20 MG/1
TABLET ORAL
COMMUNITY
Start: 2024-09-26

## 2024-10-28 NOTE — PROGRESS NOTES
Date of Office Visit: 10/28/2024  Encounter Provider: JENNIFER Corbin  Place of Service: [facility] CARDIOLOGY  Patient Name: Jose Ramon Reardon  :1955    No chief complaint on file.  : PAF   hypertension    HPI: Jose Ramon Reardon is a 69 y.o. male who is a patient of  Dr. Daniels.  Is new to me today and presents for a 1 year office follow-up.  He has a  history of PVCs with bigeminy, paroxysmal atrial fibrillation, hypertension, obstructive sleep apnea on BiPAP, GERD and a history of stroke in the past.  He has some mild chronic exertional dyspnea.    In 2020, he suffered right ventral pontine stroke.  Cardiogram showed EF 59%.  24-hour Holter showed sinus rhythm with PVCs.  Residual left foot weakness and balance issues as well as slurred speech which has improved over the years.  It was thought to be secondary to hypertension.    In 2021, 2-week Holter monitor showed paroxysmal atrial fibrillation.  He was started on Eliquis at that time.    Echocardiogram 2023 showed LVEF 64% with mild concentric left ventricular hypertrophy, no valvular disease.  1 day Zio patch 2023 showed sinus rhythm with rare PACs and rare PVCs.  One episode of VT at 110 bpm without symptoms noted.    Taiwo Hall presents today with no complaints of chest pain, shortness of air or lightheadedness.  He has no palpitations.  Blood pressure is well-controlled.  He monitors at home.  EKG is sinus rhythm.  He is on statin and Xarelto.  No bleeding issues on Xarelto.  Patient plans to start increasing his exercise activity.  He and his PCP are monitoring HbA1c closely and will develop a plan if HbA1c remains elevated on next lab.  Lipid panel shows LDL and triglycerides in target range.    Previous testing and notes have been reviewed by me.   Past Medical History:   Diagnosis Date    Abnormal EKG     IN PAST    Acute medial meniscus tear of right knee 2019     Added automatically from request for surgery [id number]  Formatting of this note might be different from the original. Added automatically from request for surgery [id number]  Formatting of this note might be different from the original. Added automatically from request for surgery [id number]    Allergic     Arthritis     Cervical disc disorder     ED (erectile dysfunction)     First degree AV block     GERD (gastroesophageal reflux disease)     Glaucoma     Hypertension     IN PAST  NO MEDS    Kidney stone     Knee swelling     Narcolepsy     Paroxysmal atrial fibrillation     Periarthritis of shoulder     PONV (postoperative nausea and vomiting)     1969 AND 1998    PVCs (premature ventricular contractions)     Rotator cuff syndrome     Sleep apnea     Stroke     TIA (transient ischemic attack)     Urinary tract infection        Past Surgical History:   Procedure Laterality Date    ANAL FISTULOTOMY      APPENDECTOMY      BACK SURGERY      CERVICAL    CATARACT EXTRACTION Bilateral     COLONOSCOPY  2014    COLONOSCOPY N/A 07/23/2024    Procedure: COLONOSCOPY INTO CECUM AND TI WITH POLYPECTOMIES (HOT SNARE);  Surgeon: Dawit Sarmiento MD;  Location: [facility] ENDOSCOPY;  Service: General;  Laterality: N/A;  PRE: POSITIVE COLOGUARD  POST: POLYPS, DIVERTICULOSIS    EYE SURGERY      Cataracts removed from both eyes    KNEE ARTHROSCOPY Right 02/12/2019    Procedure: Knee Arthroscopy with PARTICIAL MEDIAL Menisectomy;  Surgeon: Dwaine Connolly MD;  Location: [facility] OR [facility];  Service: Orthopedics    KNEE SURGERY      NECK SURGERY      WISDOM TOOTH EXTRACTION         Social History     Socioeconomic History    Marital status:    Tobacco Use    Smoking status: Never     Passive exposure: Never    Smokeless tobacco: Former     Types: Chew     Quit date: 2020    Tobacco comments:     Caffeine use: daily   Vaping Use    Vaping status: Never Used   Substance and Sexual Activity    Alcohol use: Yes     Alcohol/week: 0.0 - 2.0  standard drinks of alcohol    Drug use: Never    Sexual activity: Yes     Partners: Female     Birth control/protection: None, Hysterectomy       Family History   Problem Relation Age of Onset    Diabetes Mother     Thyroid disease Mother     Hypertension Mother     Diabetes Father     Heart disease Father     Hypertension Father     Atrial fibrillation Father     Cancer Father     Broken bones Father     Stroke Maternal Grandfather     Stroke Paternal Grandfather     Broken bones Brother     Malig Hyperthermia Neg Hx        Review of Systems   Constitutional: Negative.   HENT: Negative.     Eyes: Negative.    Cardiovascular: Negative.    Respiratory: Negative.     Endocrine: Negative.    Hematologic/Lymphatic:        Xarelto 20mg   Skin: Negative.    Musculoskeletal:  Positive for joint pain.   Gastrointestinal: Negative.    Genitourinary: Negative.    Neurological: Negative.    Psychiatric/Behavioral: Negative.     Allergic/Immunologic: Negative.        Allergies   Allergen Reactions    Clarithromycin Other (See Comments)     Unable to eat     Sulfa Antibiotics Myalgia         Current Outpatient Medications:     amphetamine-dextroamphetamine (ADDERALL) 20 MG tablet, 2 tablets in the morning and 1/2 tablet later, Disp: 225 tablet, Rfl: 0    azelastine (ASTELIN) 0.1 % nasal spray, 2 sprays into the nostril(s) as directed by provider 2 (Two) Times a Day. Use in each nostril as directed, Disp: 90 mL, Rfl: 0    betamethasone dipropionate 0.05 % lotion, Apply  topically to the appropriate area as directed 2 (Two) Times a Day. Apply small amount to scalp once daily., Disp: 60 mL, Rfl: 1    cetirizine (zyrTEC) 10 MG tablet, Take 1 tablet by mouth Daily., Disp: 30 tablet, Rfl: 1    ciclopirox (LOPROX) 1 % shampoo, USE AS A shampoo three times PER WEEK., Disp: , Rfl:     desonide (DESOWEN) 0.05 % ointment, Apply sparingly two times PER DAY TO THE affected AREAS ON THE FACE AND SCALP., Disp: , Rfl:     fluticasone (FLONASE)  "50 MCG/ACT nasal spray, USE 2 SPRAYS IN EACH NOSTRIL DAILY AS INSTRUCTED BY PROVIDER, Disp: 48 g, Rfl: 2    metoprolol succinate XL (TOPROL-XL) 25 MG 24 hr tablet, TAKE 1 TABLET BY MOUTH TWICE DAILY, Disp: 180 tablet, Rfl: 1    rivaroxaban (Xarelto) 20 MG tablet, Take 1 tablet by mouth Daily With Dinner. (Patient taking differently: Take 1 tablet by mouth Daily With Dinner. LD 7/20), Disp: 90 tablet, Rfl: 3    rosuvastatin (CRESTOR) 20 MG tablet, TAKE 1 TABLET BY MOUTH EVERY NIGHT AT BEDTIME, Disp: 90 tablet, Rfl: 1    sildenafil (REVATIO) 20 MG tablet, TAKE 1 TO 5 TABLETS BY MOUTH ONCE DAILY 30 TO 60 MINUTES BEFORE SEXUAL ACTIVITY. START WITH 1 TABLET AND INCREASE AS NEEDED., Disp: , Rfl:     timolol (TIMOPTIC) 0.5 % ophthalmic solution, Apply  to eye(s) as directed by provider Every 12 (Twelve) Hours., Disp: , Rfl:     Urea 20 Intensive Hydrating 20 % cream, , Disp: , Rfl:     valsartan (DIOVAN) 160 MG tablet, Take 1 tablet by mouth 2 (Two) Times a Day., Disp: 180 tablet, Rfl: 2      Objective:     Vitals:    10/28/24 0922   Weight: 125 kg (274 lb 9.6 oz)   Height: 182.9 cm (72\")     Body mass index is 37.24 kg/m².    11 day ZIo 06/012/2023:    A relatively benign monitor study.    Single episode of nonsustained ventricular tachycardia, 6 beats, 110 bpm, 6/17/2023, 4: 34 PM    TTE 06/08/2023:    Left ventricular systolic function is normal. Calculated left ventricular EF = 61.4%    Left ventricular wall thickness is consistent with mild concentric hypertrophy.    Left ventricular diastolic function was normal.    No significant valvular stenosis or regurgitation present.    14 Day Holter 04/15/2021:  An abnormal monitor study.  Evidence of atrial fibrillation was noted. The longest run of atrial fibrillation was 52 seconds at 135 bpm on 4/1/2021 at 10:54 PM. Atrial fibrillation occurred less than 1% the time, heart rate ranging 108 to 152 bpm..  Premature ventricular contractions occured occasionally. Ventricular " 15-Mar-2021 couplets, bigeminy and trigeminy. PVCs occurred 10% of the time..  There were three episodes of ventricular tachycardia. 4 beats of VT occurred at 120 bpm on 4/2/2021 at 3:52 AM, no symptoms.    48 Hr Holter 02/25/2021:  An abnormal monitor study.  Premature ventricular contractions occured frequently. Ventricular couplets, bigeminy and trigeminy. Occurring 13% of the time.  There was one episodes of supraventricular tachycardia. 7 beats of SVT on 2/27/2021 at 1:44 AM, 118 bpm, no symptoms     PHYSICAL EXAM:      Constitutional:       Appearance: Healthy appearance. Not in distress.   Neck:      Vascular: No JVR. JVD normal.   Pulmonary:      Effort: Pulmonary effort is normal.      Breath sounds: Normal breath sounds. No wheezing. No rhonchi. No rales.   Chest:      Chest wall: Not tender to palpatation.   Cardiovascular:      PMI at left midclavicular line. Normal rate. Regular rhythm. Normal S1. Normal S2.       Murmurs: There is no murmur.      No gallop.  No click. No rub.   Pulses:     Intact distal pulses.   Edema:     Peripheral edema absent.   Abdominal:      General: Bowel sounds are normal.      Palpations: Abdomen is soft.      Tenderness: There is no abdominal tenderness.   Musculoskeletal: Normal range of motion.         General: No tenderness. Skin:     General: Skin is warm and dry.   Neurological:      General: No focal deficit present.      Mental Status: Alert and oriented to person, place and time.           ECG 12 Lead    Date/Time: 10/28/2024 10:10 AM  Performed by: Adriana Saucedo APRN    Authorized by: Adriana Saucedo APRN  Comparison: compared with previous ECG from 11/14/2023  Similar to previous ECG  Rhythm: sinus rhythm  Rate: normal  BPM: 67  Conduction: 1st degree AV block  T inversion: III and V1            Assessment:       Diagnosis Plan   1. Paroxysmal atrial fibrillation        2. Primary hypertension        3. PVC's (premature ventricular contractions)        4. Type 2  diabetes mellitus with diabetic autonomic neuropathy, without long-term current use of insulin        5. Obstructive sleep apnea        6. History of CVA (cerebrovascular accident)          No orders of the defined types were placed in this encounter.         Plan:       1.  History of CVA (2020): on statin and Xarelto  2.  Paroxysmal atrial fibrillation: Most recent ZIO in 2023 showed no atrial fibrillation. On anticoagulation with Xarelto.  CHADSVASC score 4.  No bleeding issues.  SR on EKG today.   3.  Hypertension: well controlled. Monitors at home.   4.  Hyperlipidemia: Lipid panel in target range.  Monitored by PCP.  On statin.   5.  Obstructive sleep apnea: Compliant with BiPAP  6.  Diabetes type 2: HbA1c 7.0. Followed by PCP.     Mr. Reardon will follow-up with Dr. Daniels in 1 year.  He will call sooner for any questions or concerns         Your medication list            Accurate as of October 28, 2024  9:39 AM. If you have any questions, ask your nurse or doctor.                CHANGE how you take these medications        Instructions Last Dose Given Next Dose Due   rivaroxaban 20 MG tablet  Commonly known as: Xarelto  What changed: additional instructions      Take 1 tablet by mouth Daily With Dinner.              CONTINUE taking these medications        Instructions Last Dose Given Next Dose Due   amphetamine-dextroamphetamine 20 MG tablet  Commonly known as: ADDERALL      2 tablets in the morning and 1/2 tablet later       azelastine 0.1 % nasal spray  Commonly known as: ASTELIN      2 sprays into the nostril(s) as directed by provider 2 (Two) Times a Day. Use in each nostril as directed       betamethasone dipropionate 0.05 % lotion      Apply  topically to the appropriate area as directed 2 (Two) Times a Day. Apply small amount to scalp once daily.       cetirizine 10 MG tablet  Commonly known as: zyrTEC      Take 1 tablet by mouth Daily.       ciclopirox 1 % shampoo  Commonly known as:  LOPROX      USE AS A shampoo three times PER WEEK.       desonide 0.05 % ointment  Commonly known as: DESOWEN      Apply sparingly two times PER DAY TO THE affected AREAS ON THE FACE AND SCALP.       fluticasone 50 MCG/ACT nasal spray  Commonly known as: FLONASE      USE 2 SPRAYS IN EACH NOSTRIL DAILY AS INSTRUCTED BY PROVIDER       metoprolol succinate XL 25 MG 24 hr tablet  Commonly known as: TOPROL-XL      TAKE 1 TABLET BY MOUTH TWICE DAILY       rosuvastatin 20 MG tablet  Commonly known as: CRESTOR      TAKE 1 TABLET BY MOUTH EVERY NIGHT AT BEDTIME       sildenafil 20 MG tablet  Commonly known as: REVATIO      TAKE 1 TO 5 TABLETS BY MOUTH ONCE DAILY 30 TO 60 MINUTES BEFORE SEXUAL ACTIVITY. START WITH 1 TABLET AND INCREASE AS NEEDED.       timolol 0.5 % ophthalmic solution  Commonly known as: TIMOPTIC      Apply  to eye(s) as directed by provider Every 12 (Twelve) Hours.       Urea 20 Intensive Hydrating 20 % cream  Generic drug: urea           valsartan 160 MG tablet  Commonly known as: DIOVAN      Take 1 tablet by mouth 2 (Two) Times a Day.                  As always, it has been a pleasure to participate in your patient's care.      Sincerely,       JENNIFER Mathew

## 2024-11-14 ENCOUNTER — LAB (OUTPATIENT)
Dept: FAMILY MEDICINE CLINIC | Facility: CLINIC | Age: 69
End: 2024-11-14
Payer: MEDICARE

## 2024-11-14 DIAGNOSIS — E78.5 HYPERLIPIDEMIA, UNSPECIFIED HYPERLIPIDEMIA TYPE: ICD-10-CM

## 2024-11-14 DIAGNOSIS — E11.43 TYPE 2 DIABETES MELLITUS WITH DIABETIC AUTONOMIC NEUROPATHY, WITHOUT LONG-TERM CURRENT USE OF INSULIN: Primary | ICD-10-CM

## 2024-11-14 DIAGNOSIS — Z12.5 PROSTATE CANCER SCREENING: ICD-10-CM

## 2024-11-14 DIAGNOSIS — Z51.81 ENCOUNTER FOR THERAPEUTIC DRUG MONITORING: ICD-10-CM

## 2024-11-15 LAB
ALBUMIN SERPL-MCNC: 4.2 G/DL (ref 3.9–4.9)
ALP SERPL-CCNC: 87 IU/L (ref 44–121)
ALT SERPL-CCNC: 23 IU/L (ref 0–44)
AST SERPL-CCNC: 21 IU/L (ref 0–40)
BASOPHILS # BLD AUTO: 0 X10E3/UL (ref 0–0.2)
BASOPHILS NFR BLD AUTO: 1 %
BILIRUB SERPL-MCNC: 0.7 MG/DL (ref 0–1.2)
BUN SERPL-MCNC: 9 MG/DL (ref 8–27)
BUN/CREAT SERPL: 10 (ref 10–24)
CALCIUM SERPL-MCNC: 9.1 MG/DL (ref 8.6–10.2)
CHLORIDE SERPL-SCNC: 102 MMOL/L (ref 96–106)
CHOLEST SERPL-MCNC: 113 MG/DL (ref 100–199)
CO2 SERPL-SCNC: 26 MMOL/L (ref 20–29)
CREAT SERPL-MCNC: 0.89 MG/DL (ref 0.76–1.27)
EGFRCR SERPLBLD CKD-EPI 2021: 94 ML/MIN/1.73
EOSINOPHIL # BLD AUTO: 0.2 X10E3/UL (ref 0–0.4)
EOSINOPHIL NFR BLD AUTO: 3 %
ERYTHROCYTE [DISTWIDTH] IN BLOOD BY AUTOMATED COUNT: 13.4 % (ref 11.6–15.4)
GLOBULIN SER CALC-MCNC: 2.2 G/DL (ref 1.5–4.5)
GLUCOSE SERPL-MCNC: 115 MG/DL (ref 70–99)
HBA1C MFR BLD: 6.2 % (ref 4.8–5.6)
HCT VFR BLD AUTO: 47.6 % (ref 37.5–51)
HDLC SERPL-MCNC: 44 MG/DL
HGB BLD-MCNC: 15.7 G/DL (ref 13–17.7)
IMM GRANULOCYTES # BLD AUTO: 0 X10E3/UL (ref 0–0.1)
IMM GRANULOCYTES NFR BLD AUTO: 0 %
LDLC SERPL CALC-MCNC: 48 MG/DL (ref 0–99)
LDLC/HDLC SERPL: 1.1 RATIO (ref 0–3.6)
LYMPHOCYTES # BLD AUTO: 1.3 X10E3/UL (ref 0.7–3.1)
LYMPHOCYTES NFR BLD AUTO: 20 %
MCH RBC QN AUTO: 30.1 PG (ref 26.6–33)
MCHC RBC AUTO-ENTMCNC: 33 G/DL (ref 31.5–35.7)
MCV RBC AUTO: 91 FL (ref 79–97)
MONOCYTES # BLD AUTO: 0.5 X10E3/UL (ref 0.1–0.9)
MONOCYTES NFR BLD AUTO: 8 %
NEUTROPHILS # BLD AUTO: 4.4 X10E3/UL (ref 1.4–7)
NEUTROPHILS NFR BLD AUTO: 68 %
PLATELET # BLD AUTO: 164 X10E3/UL (ref 150–450)
POTASSIUM SERPL-SCNC: 4.4 MMOL/L (ref 3.5–5.2)
PROT SERPL-MCNC: 6.4 G/DL (ref 6–8.5)
PSA SERPL-MCNC: 0.8 NG/ML (ref 0–4)
RBC # BLD AUTO: 5.21 X10E6/UL (ref 4.14–5.8)
SODIUM SERPL-SCNC: 141 MMOL/L (ref 134–144)
TRIGL SERPL-MCNC: 118 MG/DL (ref 0–149)
UNABLE TO VOID: NORMAL
VLDLC SERPL CALC-MCNC: 21 MG/DL (ref 5–40)
WBC # BLD AUTO: 6.4 X10E3/UL (ref 3.4–10.8)

## 2024-11-18 RX ORDER — VALSARTAN 160 MG/1
160 TABLET ORAL 2 TIMES DAILY
Qty: 180 TABLET | Refills: 2 | Status: SHIPPED | OUTPATIENT
Start: 2024-11-18 | End: 2024-11-21 | Stop reason: SDUPTHER

## 2024-11-21 ENCOUNTER — OFFICE VISIT (OUTPATIENT)
Dept: FAMILY MEDICINE CLINIC | Facility: CLINIC | Age: 69
End: 2024-11-21
Payer: MEDICARE

## 2024-11-21 VITALS
DIASTOLIC BLOOD PRESSURE: 80 MMHG | HEART RATE: 68 BPM | BODY MASS INDEX: 40.14 KG/M2 | HEIGHT: 72 IN | WEIGHT: 296.4 LBS | TEMPERATURE: 96.8 F | OXYGEN SATURATION: 95 % | RESPIRATION RATE: 16 BRPM | SYSTOLIC BLOOD PRESSURE: 118 MMHG

## 2024-11-21 DIAGNOSIS — Z13.6 ENCOUNTER FOR SCREENING FOR VASCULAR DISEASE: ICD-10-CM

## 2024-11-21 DIAGNOSIS — E66.812 CLASS 2 OBESITY DUE TO EXCESS CALORIES WITHOUT SERIOUS COMORBIDITY WITH BODY MASS INDEX (BMI) OF 37.0 TO 37.9 IN ADULT: ICD-10-CM

## 2024-11-21 DIAGNOSIS — E66.09 CLASS 2 OBESITY DUE TO EXCESS CALORIES WITHOUT SERIOUS COMORBIDITY WITH BODY MASS INDEX (BMI) OF 37.0 TO 37.9 IN ADULT: ICD-10-CM

## 2024-11-21 DIAGNOSIS — I10 PRIMARY HYPERTENSION: ICD-10-CM

## 2024-11-21 DIAGNOSIS — R53.83 OTHER FATIGUE: Primary | ICD-10-CM

## 2024-11-21 DIAGNOSIS — E11.43 TYPE 2 DIABETES MELLITUS WITH DIABETIC AUTONOMIC NEUROPATHY, WITHOUT LONG-TERM CURRENT USE OF INSULIN: ICD-10-CM

## 2024-11-21 DIAGNOSIS — Z00.00 MEDICARE ANNUAL WELLNESS VISIT, SUBSEQUENT: ICD-10-CM

## 2024-11-21 RX ORDER — DORZOLAMIDE HYDROCHLORIDE AND TIMOLOL MALEATE 20; 5 MG/ML; MG/ML
SOLUTION/ DROPS OPHTHALMIC
COMMUNITY
Start: 2024-08-28

## 2024-11-21 RX ORDER — VALSARTAN 160 MG/1
160 TABLET ORAL 2 TIMES DAILY
Qty: 180 TABLET | Refills: 2 | Status: SHIPPED | OUTPATIENT
Start: 2024-11-21

## 2024-11-21 NOTE — PROGRESS NOTES
Subjective   The ABCs of the Annual Wellness Visit  Medicare Wellness Visit      Jose Ramon Reardon is a 69 y.o. patient who presents for a Medicare Wellness Visit.    The following portions of the patient's history were reviewed and   updated as appropriate: allergies, current medications, past family history, past medical history, past social history, past surgical history, and problem list.    Compared to one year ago, the patient's physical   health is better.  Compared to one year ago, the patient's mental   health is better.    Recent Hospitalizations:  He was not admitted to the hospital during the last year.     Current Medical Providers:  Patient Care Team:  Vasyl Urena MD as PCP - General (Internal Medicine)  Ray Pearson MD as Consulting Physician (Pulmonary Disease)  Daria Daniels MD as Cardiologist (Cardiology)    Outpatient Medications Prior to Visit   Medication Sig Dispense Refill    amphetamine-dextroamphetamine (ADDERALL) 20 MG tablet 2 tablets in the morning and 1/2 tablet later 225 tablet 0    azelastine (ASTELIN) 0.1 % nasal spray 2 sprays into the nostril(s) as directed by provider 2 (Two) Times a Day. Use in each nostril as directed 90 mL 0    betamethasone dipropionate 0.05 % lotion Apply  topically to the appropriate area as directed 2 (Two) Times a Day. Apply small amount to scalp once daily. 60 mL 1    cetirizine (zyrTEC) 10 MG tablet Take 1 tablet by mouth Daily. 30 tablet 1    ciclopirox (LOPROX) 1 % shampoo USE AS A shampoo three times PER WEEK.      desonide (DESOWEN) 0.05 % ointment Apply sparingly two times PER DAY TO THE affected AREAS ON THE FACE AND SCALP.      dorzolamide-timolol (COSOPT) 2-0.5 % ophthalmic solution INSTILL 1 DROP IN EACH EYE 2 TIMES A DAY      fluticasone (FLONASE) 50 MCG/ACT nasal spray USE 2 SPRAYS IN EACH NOSTRIL DAILY AS INSTRUCTED BY PROVIDER 48 g 2    metoprolol succinate XL (TOPROL-XL) 25 MG 24 hr tablet TAKE 1 TABLET BY MOUTH  TWICE DAILY 180 tablet 1    rosuvastatin (CRESTOR) 20 MG tablet TAKE 1 TABLET BY MOUTH EVERY NIGHT AT BEDTIME 90 tablet 1    sildenafil (REVATIO) 20 MG tablet TAKE 1 TO 5 TABLETS BY MOUTH ONCE DAILY 30 TO 60 MINUTES BEFORE SEXUAL ACTIVITY. START WITH 1 TABLET AND INCREASE AS NEEDED.      timolol (TIMOPTIC) 0.5 % ophthalmic solution Apply  to eye(s) as directed by provider Every 12 (Twelve) Hours.      Urea 20 Intensive Hydrating 20 % cream       valsartan (DIOVAN) 160 MG tablet Take 1 tablet by mouth 2 (Two) Times a Day. 180 tablet 2    rivaroxaban (Xarelto) 20 MG tablet Take 1 tablet by mouth Daily With Dinner. (Patient not taking: Reported on 11/21/2024) 90 tablet 3     No facility-administered medications prior to visit.     No opioid medication identified on active medication list. I have reviewed chart for other potential  high risk medication/s and harmful drug interactions in the elderly.      Aspirin is not on active medication list.  Aspirin use is not indicated based on review of current medical condition/s. Risk of harm outweighs potential benefits.  .    Patient Active Problem List   Diagnosis    Cervical spinal stenosis    ED (erectile dysfunction) of non-organic origin    Hypertension    Lumbar radiculopathy    Neoplasm of skin    Obstructive sleep apnea    Neck pain    Benign non-nodular prostatic hyperplasia with lower urinary tract symptoms    Shoulder pain, acute    Urinary frequency    Chronic pain of right knee    Rotator cuff tendonitis    Other fatigue    Paroxysmal atrial fibrillation    PVC's (premature ventricular contractions)    History of CVA (cerebrovascular accident)    CVA (cerebral vascular accident)    Excessive daytime sleepiness    Impacted cerumen of both ears    Narcolepsy without cataplexy    Acute medial meniscus tear of right knee    First degree AV block    Medicare annual wellness visit, subsequent    Age-related cataract of left eye    Dandruff in adult    Positive  "colorectal cancer screening using DNA-based stool test    Type 2 diabetes mellitus with diabetic autonomic neuropathy, without long-term current use of insulin    Class 2 obesity due to excess calories without serious comorbidity with body mass index (BMI) of 37.0 to 37.9 in adult     Advance Care Planning Advance Directive is not on file.  ACP discussion was held with the patient during this visit. Patient has an advance directive (not in EMR), copy requested.            Objective   Vitals:    11/21/24 1314   BP: 118/80   BP Location: Right arm   Patient Position: Sitting   Cuff Size: Large Adult   Pulse: 68   Resp: 16   Temp: 96.8 °F (36 °C)   TempSrc: Temporal   SpO2: 95%   Weight: 134 kg (296 lb 6.4 oz)   Height: 182.9 cm (72.01\")       Estimated body mass index is 40.19 kg/m² as calculated from the following:    Height as of this encounter: 182.9 cm (72.01\").    Weight as of this encounter: 134 kg (296 lb 6.4 oz).            Does the patient have evidence of cognitive impairment? No  Lab Results   Component Value Date    CHLPL 113 11/14/2024    TRIG 118 11/14/2024    HDL 44 11/14/2024    LDL 48 11/14/2024    VLDL 21 11/14/2024    HGBA1C 6.2 (H) 11/14/2024                                                                                                Health  Risk Assessment    Smoking Status:  Social History     Tobacco Use   Smoking Status Never    Passive exposure: Never   Smokeless Tobacco Former    Types: Chew    Quit date: 2020   Tobacco Comments    Caffeine use: daily     Alcohol Consumption:  Social History     Substance and Sexual Activity   Alcohol Use Yes    Alcohol/week: 0.0 - 2.0 standard drinks of alcohol       Fall Risk Screen  STEADI Fall Risk Assessment was completed, and patient is at MODERATE risk for falls. Assessment completed on:11/21/2024    Depression Screening   Little interest or pleasure in doing things? Not at all   Feeling down, depressed, or hopeless? Not at all   PHQ-2 Total Score 0 "      Health Habits and Functional and Cognitive Screenin/21/2024     1:00 PM   Functional & Cognitive Status   Do you have difficulty preparing food and eating? No   Do you have difficulty bathing yourself, getting dressed or grooming yourself? No   Do you have difficulty using the toilet? No   Do you have difficulty moving around from place to place? No   Do you have trouble with steps or getting out of a bed or a chair? Yes   Current Diet Well Balanced Diet   Dental Exam Up to date   Eye Exam Up to date   Exercise (times per week) 0 times per week   Current Exercises Include No Regular Exercise   Do you need help using the phone?  No   Are you deaf or do you have serious difficulty hearing?  No   Do you need help to go to places out of walking distance? No   Do you need help shopping? No   Do you need help preparing meals?  No   Do you need help with housework?  No   Do you need help with laundry? No   Do you need help taking your medications? No   Do you need help managing money? No   Do you ever drive or ride in a car without wearing a seat belt? No   Have you felt unusual stress, anger or loneliness in the last month? No   Who do you live with? Spouse   If you need help, do you have trouble finding someone available to you? No   Have you been bothered in the last four weeks by sexual problems? No   Do you have difficulty concentrating, remembering or making decisions? No           Age-appropriate Screening Schedule:  Refer to the list below for future screening recommendations based on patient's age, sex and/or medical conditions. Orders for these recommended tests are listed in the plan section. The patient has been provided with a written plan.    Health Maintenance List  Health Maintenance   Topic Date Due    HEMOGLOBIN A1C  2025    BMI FOLLOWUP  2025    DIABETIC EYE EXAM  2025    TDAP/TD VACCINES (2 - Td or Tdap) 2025    LIPID PANEL  2025    ANNUAL WELLNESS VISIT   "11/21/2025    COLORECTAL CANCER SCREENING  07/23/2029    COVID-19 Vaccine  Completed    INFLUENZA VACCINE  Completed    Pneumococcal Vaccine 65+  Completed    HEPATITIS C SCREENING  Addressed    ZOSTER VACCINE  Addressed                                                                                                                                                CMS Preventative Services Quick Reference  Risk Factors Identified During Encounter  Inactivity/Sedentary: Patient was advised to exercise at least 150 minutes a week per CDC recommendations.    The above risks/problems have been discussed with the patient.  Pertinent information has been shared with the patient in the After Visit Summary.  An After Visit Summary and PPPS were made available to the patient.    Follow Up:   Next Medicare Wellness visit to be scheduled in 1 year.         Additional E&M Note during same encounter follows:  Patient has additional, significant, and separately identifiable condition(s)/problem(s) that require work above and beyond the Medicare Wellness Visit     Chief Complaint  Medicare Wellness-subsequent    Subjective    HPI                   Objective   Vital Signs:  /80 (BP Location: Right arm, Patient Position: Sitting, Cuff Size: Large Adult)   Pulse 68   Temp 96.8 °F (36 °C) (Temporal)   Resp 16   Ht 182.9 cm (72.01\")   Wt 134 kg (296 lb 6.4 oz)   SpO2 95%   BMI 40.19 kg/m²   Physical Exam                  Results                Assessment and Plan                    Follow Up   No follow-ups on file.  Patient was given instructions and counseling regarding his condition or for health maintenance advice. Please see specific information pulled into the AVS if appropriate.  Patient or patient representative verbalized consent for the use of Ambient Listening during the visit with  Vasyl Urena MD for chart documentation. 11/22/2024  13:58 EST      "

## 2024-11-22 LAB
PSA SERPL-MCNC: 0.7 NG/ML (ref 0–4)
T4 FREE SERPL-MCNC: 1.3 NG/DL (ref 0.82–1.77)
TSH SERPL DL<=0.005 MIU/L-ACNC: 1.13 UIU/ML (ref 0.45–4.5)

## 2024-12-17 ENCOUNTER — TELEPHONE (OUTPATIENT)
Dept: FAMILY MEDICINE CLINIC | Facility: CLINIC | Age: 69
End: 2024-12-17

## 2024-12-17 NOTE — TELEPHONE ENCOUNTER
Caller: Jose Ramon Reardon    Relationship: Self    Best call back number: [phone]     What medication are you requesting: ALLEGRA      If a prescription is needed, what is your preferred pharmacy and phone number: [id number] - [place], [place] - [address] - [phone]  - [phone]     Additional notes:PCP HAD TOLD PATIENT HE WAS SWITCHING HIM TO ALLEGRA AT HIS LAST APPOINTMENT, BUT NOTHING WAS CALLED IN TO THE PHARMACY

## 2025-01-07 DIAGNOSIS — G47.419 NARCOLEPSY WITHOUT CATAPLEXY: ICD-10-CM

## 2025-01-07 RX ORDER — DEXTROAMPHETAMINE SACCHARATE, AMPHETAMINE ASPARTATE, DEXTROAMPHETAMINE SULFATE AND AMPHETAMINE SULFATE 5; 5; 5; 5 MG/1; MG/1; MG/1; MG/1
TABLET ORAL
Qty: 225 TABLET | Refills: 0 | Status: SHIPPED | OUTPATIENT
Start: 2025-01-07 | End: 2025-01-07 | Stop reason: SDUPTHER

## 2025-01-07 RX ORDER — DEXTROAMPHETAMINE SACCHARATE, AMPHETAMINE ASPARTATE, DEXTROAMPHETAMINE SULFATE AND AMPHETAMINE SULFATE 5; 5; 5; 5 MG/1; MG/1; MG/1; MG/1
TABLET ORAL
Qty: 225 TABLET | Refills: 0 | Status: SHIPPED | OUTPATIENT
Start: 2025-01-07 | End: 2025-01-07

## 2025-01-07 RX ORDER — DEXTROAMPHETAMINE SACCHARATE, AMPHETAMINE ASPARTATE, DEXTROAMPHETAMINE SULFATE AND AMPHETAMINE SULFATE 5; 5; 5; 5 MG/1; MG/1; MG/1; MG/1
TABLET ORAL
Qty: 225 TABLET | Refills: 0 | Status: SHIPPED | OUTPATIENT
Start: 2025-01-07

## 2025-01-10 RX ORDER — METOPROLOL SUCCINATE 25 MG/1
25 TABLET, EXTENDED RELEASE ORAL 2 TIMES DAILY
Qty: 180 TABLET | Refills: 1 | Status: SHIPPED | OUTPATIENT
Start: 2025-01-10

## 2025-01-10 RX ORDER — AZELASTINE 1 MG/ML
2 SPRAY, METERED NASAL 2 TIMES DAILY
Qty: 90 ML | Refills: 0 | Status: SHIPPED | OUTPATIENT
Start: 2025-01-10

## 2025-01-10 NOTE — TELEPHONE ENCOUNTER
Caller: Jose Ramon Reardon    Relationship to patient: Self    Best call back number:   Telephone Information:   Mobile [phone]       Patient is needing: PATIENT WAS CALLING BACK ABOUT THIS MEDICATION AS HE WOULD LIKE A PRESCRIPTION SENT IN TO THE PHARMACY FOR IT.PLEASE CALLBACK ADVISE.    PREFERRED PHARMACY:[id number] - [facility] [address] - [phone] [facility] [phone]  [phone]

## 2025-01-10 NOTE — TELEPHONE ENCOUNTER
Caller: Jose Ramon Reardon    Relationship: Self    Best call back number:   Telephone Information:   Mobile [phone]         Requested Prescriptions:   Requested Prescriptions     Pending Prescriptions Disp Refills    azelastine (ASTELIN) 0.1 % nasal spray 90 mL 0     Sig: Administer 2 sprays into the nostril(s) as directed by provider 2 (Two) Times a Day. Use in each nostril as directed    metoprolol succinate XL (TOPROL-XL) 25 MG 24 hr tablet 180 tablet 1     Sig: Take 1 tablet by mouth 2 (Two) Times a Day.        Pharmacy where request should be sent: [id number] - [facility] [address] - [phone]  - [phone] FX     Last office visit with prescribing clinician: 11/21/2024   Last telemedicine visit with prescribing clinician: Visit date not found   Next office visit with prescribing clinician: 6/9/2025     Additional details provided by patient: 2 DAYS LEFT    Does the patient have less than a 3 day supply:  [] Yes  [x] No    Would you like a call back once the refill request has been completed: [] Yes [x] No    If the office needs to give you a call back, can they leave a voicemail: [] Yes [x] No    Rico Powell Rep   01/10/25 10:02 EST

## 2025-01-28 ENCOUNTER — OFFICE VISIT (OUTPATIENT)
Facility: HOSPITAL | Age: 70
End: 2025-01-28
Payer: MEDICARE

## 2025-01-28 VITALS
BODY MASS INDEX: 28.04 KG/M2 | DIASTOLIC BLOOD PRESSURE: 67 MMHG | WEIGHT: 207 LBS | HEIGHT: 72 IN | OXYGEN SATURATION: 94 % | HEART RATE: 82 BPM | SYSTOLIC BLOOD PRESSURE: 114 MMHG

## 2025-01-28 DIAGNOSIS — G47.419 NARCOLEPSY WITHOUT CATAPLEXY: Primary | ICD-10-CM

## 2025-01-28 DIAGNOSIS — E66.09 CLASS 2 OBESITY DUE TO EXCESS CALORIES WITHOUT SERIOUS COMORBIDITY WITH BODY MASS INDEX (BMI) OF 37.0 TO 37.9 IN ADULT: ICD-10-CM

## 2025-01-28 DIAGNOSIS — E66.812 CLASS 2 OBESITY DUE TO EXCESS CALORIES WITHOUT SERIOUS COMORBIDITY WITH BODY MASS INDEX (BMI) OF 37.0 TO 37.9 IN ADULT: ICD-10-CM

## 2025-01-28 DIAGNOSIS — E66.09 CLASS 2 OBESITY DUE TO EXCESS CALORIES WITH BODY MASS INDEX (BMI) OF 37.0 TO 37.9 IN ADULT, UNSPECIFIED WHETHER SERIOUS COMORBIDITY PRESENT: ICD-10-CM

## 2025-01-28 DIAGNOSIS — R40.0 SOMNOLENCE: ICD-10-CM

## 2025-01-28 DIAGNOSIS — E66.812 CLASS 2 OBESITY DUE TO EXCESS CALORIES WITH BODY MASS INDEX (BMI) OF 37.0 TO 37.9 IN ADULT, UNSPECIFIED WHETHER SERIOUS COMORBIDITY PRESENT: ICD-10-CM

## 2025-01-28 DIAGNOSIS — G47.33 OBSTRUCTIVE SLEEP APNEA: ICD-10-CM

## 2025-01-28 NOTE — PROGRESS NOTES
Follow Up [facility] Note       Primary Care Physician: Vasyl Urena MD    Interval History:   The patient is a 69 y.o. male      History of Present Illness  The patient presents for evaluation of sleep apnea, left foot jerking, and hip pain.    He reports satisfactory progress with his sleep apnea, although he experiences awakenings at night, ranging from one to three times. He has been experiencing significant congestion upon awakening. He was scheduled to see Dr. Kent but saw a nurse practitioner instead as Dr. Kent was not available that day. His current medication regimen includes Adderall, administered as two tablets in the morning and one-half tablet in the afternoon. He mentions that his pharmacy only provided a one-month supply of Adderall, despite the prescription being written for three months. Consequently, he will need to contact us when his supply is nearing depletion. His initial sleep study revealed severe sleep apnea, with an average of 48 apneic episodes per hour and a maximum apnea duration of 48 seconds. However, he reports no current symptoms indicative of such severity.    Approximately three weeks ago, he began experiencing involuntary jerking of his left foot, which he can somewhat control by placing his other foot in front of it. This symptom does not resemble a cramp and is not associated with pain. He speculates that this could be a manifestation of restless leg syndrome. The frequency of these episodes has decreased recently, with the most recent episode occurring some time ago.    He also reports hip pain, which necessitates changing positions during sleep.    MEDICATIONS  Adderall         Downloaded PAP Data Evaluated For Therapeutic Response and Compliance:  DME is [facility].  Downloads between 12/24/2024 and 1/22/2025.  Average usage is 7 hours.  Average AHI is 1.4.  PAP pressure is IPAP of 14.1 and EPAP of 9.1 with pressure support of 5.0 CWP.    The  "patient uses a pillows interface.    Review of Systems:    A complete review of systems was done and all were negative with the exception of may be restless legs    Social History:    Social History     Socioeconomic History    Marital status:    Tobacco Use    Smoking status: Never     Passive exposure: Never    Smokeless tobacco: Former     Types: Chew     Quit date: 2020    Tobacco comments:     Caffeine use: daily   Vaping Use    Vaping status: Never Used   Substance and Sexual Activity    Alcohol use: Yes     Alcohol/week: 0.0 - 2.0 standard drinks of alcohol    Drug use: Never    Sexual activity: Yes     Partners: Female     Birth control/protection: None, Hysterectomy       Allergies:  Clarithromycin and Sulfa antibiotics     Medication Review:  Reviewed.      Vital Signs:    Vitals:    01/28/25 1202   BP: 114/67   BP Location: Left arm   Patient Position: Sitting   Pulse: 82   SpO2: 94%   Weight: 93.9 kg (207 lb)   Height: 182.9 cm (72\")     Body mass index is 28.07 kg/m².  .BMIFOLLOWUP    Physical Exam:    Constitutional:  Well developed 69 y.o. male that appears in no apparent distress.  Awake & oriented times 3.  Normal mood with normal recent and remote memory and normal judgement.  Eyes:  Conjunctivae normal.      Self-administered [place] Sleepiness Scale test results: 6  0-5 Lower normal daytime sleepiness  6-10 Higher normal daytime sleepiness  11-12 Mild, 13-15 Moderate, & 16-24 Severe excessive daytime sleepiness       I have reviewed the above results and compared them with the patient's last downloads and reviewed with the patient.    Impression:     Encounter Diagnoses   Name Primary?    Narcolepsy without cataplexy Yes    Class 2 obesity due to excess calories without serious comorbidity with body mass index (BMI) of 37.0 to 37.9 in adult     Obstructive sleep apnea     Class 2 obesity due to excess calories with body mass index (BMI) of 37.0 to 37.9 in adult, unspecified whether serious " comorbidity present     Somnolence          Assessment & Plan  1. Sleep apnea.  The patient's sleep apnea has shown improvement, with the frequency of apneic episodes reducing from 18 times per hour to 1.4 times per hour. The longest duration without breathing during the initial test was 48 seconds, which has not been experienced recently. The average PAP pressure has increased slightly from 14 to 15, and the expiratory pressure has increased from 9 to 10. This may be due to recent weight gain and increased congestion upon waking. A prescription refill for his supplies has been provided. He will inform us when a medication refill is necessary.    2. Left foot jerking.  The patient reported an episode of his left foot jerking up about 3 weeks ago, which he can slow down by putting his other foot in front of it. It does not feel like a cramp and does not hurt. This episode kept him up for a while but has not happened since.    3. Hip pain.  The patient mentioned experiencing hip pain that requires him to roll over to the other side while sleeping.    Follow-up  The patient will follow up in 1 year.         Good sleep hygiene measures should be maintained.  Weight loss would be beneficial in this patient who is overweight by Body mass index is 28.07 kg/m².      After evaluating the patient and assessing results available, the patient is benefiting from the treatment being provided.     The patient will continue BiPAP and Vyvanse.  Potential side effects of PAP therapy reviewed and addressed as needed.  After clinical evaluation and review of downloads, I recommend no changes to the patient's pressures.      I answered all of the patient's questions.  The patient will call the [facility] Disorder Center for any problems and will follow up 1 year.    Patient or patient representative verbalized consent for the use of Ambient Listening during the visit with  Napoleon Mandel MD for chart documentation. 1/28/2025  13:17 EST            Napoleon Mandel MD  Sleep Medicine  01/28/25  13:06 EST

## 2025-02-05 DIAGNOSIS — G47.419 NARCOLEPSY WITHOUT CATAPLEXY: ICD-10-CM

## 2025-02-06 RX ORDER — DEXTROAMPHETAMINE SACCHARATE, AMPHETAMINE ASPARTATE, DEXTROAMPHETAMINE SULFATE AND AMPHETAMINE SULFATE 5; 5; 5; 5 MG/1; MG/1; MG/1; MG/1
TABLET ORAL
Qty: 75 TABLET | Refills: 0 | Status: SHIPPED | OUTPATIENT
Start: 2025-02-07

## 2025-02-06 RX ORDER — DEXTROAMPHETAMINE SACCHARATE, AMPHETAMINE ASPARTATE, DEXTROAMPHETAMINE SULFATE AND AMPHETAMINE SULFATE 5; 5; 5; 5 MG/1; MG/1; MG/1; MG/1
TABLET ORAL
Qty: 225 TABLET | Refills: 0 | OUTPATIENT
Start: 2025-02-06

## 2025-02-18 RX ORDER — FLUTICASONE PROPIONATE 50 MCG
1 SPRAY, SUSPENSION (ML) NASAL DAILY
Qty: 48 G | Refills: 2 | Status: SHIPPED | OUTPATIENT
Start: 2025-02-18

## 2025-02-18 RX ORDER — VALSARTAN 160 MG/1
160 TABLET ORAL 2 TIMES DAILY
Qty: 180 TABLET | Refills: 2 | Status: SHIPPED | OUTPATIENT
Start: 2025-02-18

## 2025-03-10 DIAGNOSIS — G47.419 NARCOLEPSY WITHOUT CATAPLEXY: ICD-10-CM

## 2025-03-10 RX ORDER — DEXTROAMPHETAMINE SACCHARATE, AMPHETAMINE ASPARTATE, DEXTROAMPHETAMINE SULFATE AND AMPHETAMINE SULFATE 5; 5; 5; 5 MG/1; MG/1; MG/1; MG/1
TABLET ORAL
Qty: 75 TABLET | Refills: 0 | Status: SHIPPED | OUTPATIENT
Start: 2025-03-10

## 2025-03-22 RX ORDER — METOPROLOL SUCCINATE 25 MG/1
25 TABLET, EXTENDED RELEASE ORAL 2 TIMES DAILY
Qty: 180 TABLET | Refills: 0 | Status: SHIPPED | OUTPATIENT
Start: 2025-03-22

## 2025-03-22 RX ORDER — AZELASTINE 1 MG/ML
SPRAY, METERED NASAL
Qty: 90 ML | Refills: 0 | Status: SHIPPED | OUTPATIENT
Start: 2025-03-22

## 2025-04-04 RX ORDER — ROSUVASTATIN CALCIUM 20 MG/1
20 TABLET, COATED ORAL
Qty: 90 TABLET | Refills: 1 | Status: SHIPPED | OUTPATIENT
Start: 2025-04-04

## 2025-04-09 DIAGNOSIS — G47.419 NARCOLEPSY WITHOUT CATAPLEXY: ICD-10-CM

## 2025-04-09 RX ORDER — DEXTROAMPHETAMINE SACCHARATE, AMPHETAMINE ASPARTATE, DEXTROAMPHETAMINE SULFATE AND AMPHETAMINE SULFATE 5; 5; 5; 5 MG/1; MG/1; MG/1; MG/1
TABLET ORAL
Qty: 75 TABLET | Refills: 0 | Status: SHIPPED | OUTPATIENT
Start: 2025-04-09

## 2025-04-14 ENCOUNTER — TELEPHONE (OUTPATIENT)
Dept: FAMILY MEDICINE CLINIC | Facility: CLINIC | Age: 70
End: 2025-04-14
Payer: MEDICARE

## 2025-04-14 RX ORDER — ROSUVASTATIN CALCIUM 20 MG/1
20 TABLET, COATED ORAL
Qty: 90 TABLET | Refills: 0 | Status: SHIPPED | OUTPATIENT
Start: 2025-04-14

## 2025-04-14 NOTE — TELEPHONE ENCOUNTER
Caller: Jose Ramon Reardon    Relationship: Self    Best call back number: [phone]     What medication are you requesting: rosuvastatin (CRESTOR) 20 MG table     If a prescription is needed, what is your preferred pharmacy and phone number: [id number] - [place], [place] - [address] - [phone]  - [phone] FX     Additional notes: IT WAS SENT TO THE INCORRECT PHARMACY. PATIENT IS OUT OF MEDICATION.

## 2025-05-12 DIAGNOSIS — G47.419 NARCOLEPSY WITHOUT CATAPLEXY: ICD-10-CM

## 2025-05-12 RX ORDER — DEXTROAMPHETAMINE SACCHARATE, AMPHETAMINE ASPARTATE, DEXTROAMPHETAMINE SULFATE AND AMPHETAMINE SULFATE 5; 5; 5; 5 MG/1; MG/1; MG/1; MG/1
TABLET ORAL
Qty: 75 TABLET | Refills: 0 | Status: SHIPPED | OUTPATIENT
Start: 2025-05-12

## 2025-05-20 ENCOUNTER — TELEPHONE (OUTPATIENT)
Dept: ORTHOPEDIC SURGERY | Facility: CLINIC | Age: 70
End: 2025-05-20

## 2025-05-20 NOTE — TELEPHONE ENCOUNTER
Caller: Jose Ramon Reardon    Relationship to patient: Self    Best call back number: [phone] (home)     Chief complaint: RT KNEE    Type of visit: GEL INJECTION    Requested date: ASAP

## 2025-05-21 RX ORDER — VALSARTAN 160 MG/1
160 TABLET ORAL 2 TIMES DAILY
Qty: 180 TABLET | Refills: 0 | Status: SHIPPED | OUTPATIENT
Start: 2025-05-21

## 2025-06-09 RX ORDER — AZELASTINE 1 MG/ML
2 SPRAY, METERED NASAL 2 TIMES DAILY
Qty: 90 ML | Refills: 0 | Status: SHIPPED | OUTPATIENT
Start: 2025-06-09

## 2025-06-10 DIAGNOSIS — G47.419 NARCOLEPSY WITHOUT CATAPLEXY: ICD-10-CM

## 2025-06-10 RX ORDER — DEXTROAMPHETAMINE SACCHARATE, AMPHETAMINE ASPARTATE, DEXTROAMPHETAMINE SULFATE AND AMPHETAMINE SULFATE 5; 5; 5; 5 MG/1; MG/1; MG/1; MG/1
TABLET ORAL
Qty: 75 TABLET | Refills: 0 | Status: SHIPPED | OUTPATIENT
Start: 2025-06-10

## 2025-06-19 DIAGNOSIS — E11.43 TYPE 2 DIABETES MELLITUS WITH DIABETIC AUTONOMIC NEUROPATHY, WITHOUT LONG-TERM CURRENT USE OF INSULIN: ICD-10-CM

## 2025-06-19 DIAGNOSIS — E78.5 HYPERLIPIDEMIA, UNSPECIFIED HYPERLIPIDEMIA TYPE: ICD-10-CM

## 2025-06-19 DIAGNOSIS — I10 PRIMARY HYPERTENSION: Primary | ICD-10-CM

## 2025-06-20 LAB
ALBUMIN SERPL-MCNC: 4.3 G/DL (ref 3.5–5.2)
ALBUMIN/CREAT UR: 10 MG/G CREAT (ref 0–29)
ALBUMIN/GLOB SERPL: 1.8 G/DL
ALP SERPL-CCNC: 85 U/L (ref 39–117)
ALT SERPL-CCNC: 25 U/L (ref 1–41)
AST SERPL-CCNC: 14 U/L (ref 1–40)
BASOPHILS # BLD AUTO: 0.04 10*3/MM3 (ref 0–0.2)
BASOPHILS NFR BLD AUTO: 0.5 % (ref 0–1.5)
BILIRUB SERPL-MCNC: 0.8 MG/DL (ref 0–1.2)
BUN SERPL-MCNC: 14 MG/DL (ref 8–23)
BUN/CREAT SERPL: 16.5 (ref 7–25)
CALCIUM SERPL-MCNC: 9.2 MG/DL (ref 8.6–10.5)
CHLORIDE SERPL-SCNC: 105 MMOL/L (ref 98–107)
CHOLEST SERPL-MCNC: 118 MG/DL (ref 0–200)
CO2 SERPL-SCNC: 24.7 MMOL/L (ref 22–29)
CREAT SERPL-MCNC: 0.85 MG/DL (ref 0.76–1.27)
CREAT UR-MCNC: 143.3 MG/DL
EGFRCR SERPLBLD CKD-EPI 2021: 94.6 ML/MIN/1.73
EOSINOPHIL # BLD AUTO: 0.18 10*3/MM3 (ref 0–0.4)
EOSINOPHIL NFR BLD AUTO: 2.4 % (ref 0.3–6.2)
ERYTHROCYTE [DISTWIDTH] IN BLOOD BY AUTOMATED COUNT: 13.7 % (ref 12.3–15.4)
GLOBULIN SER CALC-MCNC: 2.4 GM/DL
GLUCOSE SERPL-MCNC: 119 MG/DL (ref 65–99)
HBA1C MFR BLD: 6 % (ref 4.8–5.6)
HCT VFR BLD AUTO: 47.8 % (ref 37.5–51)
HDLC SERPL-MCNC: 45 MG/DL (ref 40–60)
HGB BLD-MCNC: 16.2 G/DL (ref 13–17.7)
IMM GRANULOCYTES # BLD AUTO: 0.02 10*3/MM3 (ref 0–0.05)
IMM GRANULOCYTES NFR BLD AUTO: 0.3 % (ref 0–0.5)
LDLC SERPL CALC-MCNC: 51 MG/DL (ref 0–100)
LDLC/HDLC SERPL: 1.07 {RATIO}
LYMPHOCYTES # BLD AUTO: 1.81 10*3/MM3 (ref 0.7–3.1)
LYMPHOCYTES NFR BLD AUTO: 24.5 % (ref 19.6–45.3)
MCH RBC QN AUTO: 31.2 PG (ref 26.6–33)
MCHC RBC AUTO-ENTMCNC: 33.9 G/DL (ref 31.5–35.7)
MCV RBC AUTO: 91.9 FL (ref 79–97)
MICROALBUMIN UR-MCNC: 14.8 UG/ML
MONOCYTES # BLD AUTO: 0.6 10*3/MM3 (ref 0.1–0.9)
MONOCYTES NFR BLD AUTO: 8.1 % (ref 5–12)
NEUTROPHILS # BLD AUTO: 4.73 10*3/MM3 (ref 1.7–7)
NEUTROPHILS NFR BLD AUTO: 64.2 % (ref 42.7–76)
NRBC BLD AUTO-RTO: 0 /100 WBC (ref 0–0.2)
PLATELET # BLD AUTO: 167 10*3/MM3 (ref 140–450)
POTASSIUM SERPL-SCNC: 4.2 MMOL/L (ref 3.5–5.2)
PROT SERPL-MCNC: 6.7 G/DL (ref 6–8.5)
RBC # BLD AUTO: 5.2 10*6/MM3 (ref 4.14–5.8)
SODIUM SERPL-SCNC: 142 MMOL/L (ref 136–145)
TRIGL SERPL-MCNC: 124 MG/DL (ref 0–150)
VLDLC SERPL CALC-MCNC: 22 MG/DL (ref 5–40)
WBC # BLD AUTO: 7.38 10*3/MM3 (ref 3.4–10.8)

## 2025-06-26 ENCOUNTER — OFFICE VISIT (OUTPATIENT)
Dept: FAMILY MEDICINE CLINIC | Facility: CLINIC | Age: 70
End: 2025-06-26
Payer: MEDICARE

## 2025-06-26 VITALS
OXYGEN SATURATION: 94 % | RESPIRATION RATE: 17 BRPM | HEART RATE: 76 BPM | WEIGHT: 268 LBS | SYSTOLIC BLOOD PRESSURE: 120 MMHG | HEIGHT: 72 IN | BODY MASS INDEX: 36.3 KG/M2 | TEMPERATURE: 96.6 F | DIASTOLIC BLOOD PRESSURE: 78 MMHG

## 2025-06-26 DIAGNOSIS — E53.8 VITAMIN B12 DEFICIENCY: ICD-10-CM

## 2025-06-26 DIAGNOSIS — E11.43 TYPE 2 DIABETES MELLITUS WITH DIABETIC AUTONOMIC NEUROPATHY, WITHOUT LONG-TERM CURRENT USE OF INSULIN: ICD-10-CM

## 2025-06-26 DIAGNOSIS — Z13.6 ENCOUNTER FOR SCREENING FOR VASCULAR DISEASE: ICD-10-CM

## 2025-06-26 DIAGNOSIS — E55.9 VITAMIN D DEFICIENCY: Primary | ICD-10-CM

## 2025-06-26 DIAGNOSIS — I10 PRIMARY HYPERTENSION: ICD-10-CM

## 2025-06-26 RX ORDER — DORZOLAMIDE HYDROCHLORIDE AND TIMOLOL MALEATE 20; 5 MG/ML; MG/ML
1 SOLUTION/ DROPS OPHTHALMIC EVERY 12 HOURS
COMMUNITY
Start: 2025-05-12

## 2025-06-26 RX ORDER — CLOTRIMAZOLE 1 %
1 CREAM (GRAM) TOPICAL 2 TIMES DAILY
Qty: 113 G | Refills: 1 | Status: SHIPPED | OUTPATIENT
Start: 2025-06-26

## 2025-06-26 RX ORDER — AMMONIUM LACTATE 12 G/100G
LOTION TOPICAL
COMMUNITY
Start: 2025-03-26

## 2025-06-27 LAB
25(OH)D3+25(OH)D2 SERPL-MCNC: 43 NG/ML (ref 30–100)
VIT B12 SERPL-MCNC: 313 PG/ML (ref 232–1245)

## 2025-07-03 PROBLEM — M17.0 OSTEOARTHRITIS OF BOTH KNEES: Status: ACTIVE | Noted: 2025-07-03

## 2025-07-03 NOTE — PROGRESS NOTES
Subjective   Jose Ramon Reardon is a 70 y.o. male. Patient is here today for   Chief Complaint   Patient presents with    Med Refill     FLONASE    Hypertension        Hypertension  Primary Care Follow-Up  Conditions present: diabetes and other    Treatment compliance:  All of the time  Treatment barriers:  Lack of exercise  Exercise:  Intermittently  Diabetes:     Current treatments:  Diet    Home blood tests:  Not monitored    Meal planning:  Mediterranean diet    Eye exam current: yes      Sees podiatrist: yes    Other:     Additional other condition information:  Discuss colonoscopy and rash on back.    History of Present Illness  The patient presents for evaluation of a rash on his lower back, knee pain, and vitamin D deficiency.    He reports an itchy rash on his lower back, which has been present for several months. His wife has observed flaking skin from the affected area. He has not sought dermatological consultation for this issue.    He is scheduled to receive gel injections in his knee, a treatment he has not previously undergone. The procedure is expected to take place in the first week of 2025 or on 2025.    He had a positive Cologuard test and underwent a colonoscopy in 2024, during which three polyps were removed. He experienced some pain during the procedure due to the need to inflate his colon with air. He is curious about whether he should repeat the Cologuard test or proceed directly to a colonoscopy in five years.    He was previously advised to start vitamin D supplementation, but this topic has not been revisited in subsequent consultations. He currently takes vitamin D gummies daily.    He also reports that his mother passed away recently, and he has been dealing with the emotional and logistical aspects of her passing.    FAMILY HISTORY  His mother had dementia and  at the age of 88.      Vitals:    25 1112   BP: 120/78   Pulse: 76   Resp: 17   Temp: 96.6 °F (35.9  °C)   SpO2: 94%     Body mass index is 36.34 kg/m².    Past Medical History:   Diagnosis Date    Abnormal EKG     IN PAST    Acute medial meniscus tear of right knee 01/28/2019    Added automatically from request for surgery [id number]  Formatting of this note might be different from the original. Added automatically from request for surgery [id number]  Formatting of this note might be different from the original. Added automatically from request for surgery [id number]    Allergic     Arthritis     Cervical disc disorder     ED (erectile dysfunction)     First degree AV block     GERD (gastroesophageal reflux disease)     Glaucoma     Hypertension     IN PAST  NO MEDS    Kidney stone     Knee swelling     Narcolepsy     Paroxysmal atrial fibrillation     Periarthritis of shoulder     PONV (postoperative nausea and vomiting)     1969 AND 1998    PVCs (premature ventricular contractions)     Rotator cuff syndrome     Sleep apnea     Stroke     TIA (transient ischemic attack)     Urinary tract infection       Allergies   Allergen Reactions    Clarithromycin Other (See Comments)     Unable to eat     Sulfa Antibiotics Myalgia      Social History     Socioeconomic History    Marital status:    Tobacco Use    Smoking status: Never     Passive exposure: Never    Smokeless tobacco: Former     Types: Chew     Quit date: 2020    Tobacco comments:     Caffeine use: daily   Vaping Use    Vaping status: Never Used   Substance and Sexual Activity    Alcohol use: Yes     Alcohol/week: 0.0 - 2.0 standard drinks of alcohol    Drug use: Never    Sexual activity: Yes     Partners: Female     Birth control/protection: None, Hysterectomy        Current Outpatient Medications:     ammonium lactate (LAC-HYDRIN) 12 % lotion, apply to affected area topically twice daily, avoid between toes., Disp: , Rfl:     amphetamine-dextroamphetamine (ADDERALL) 20 MG tablet, 2 tablets in the morning and 1/2 tablet later, Disp: 75 tablet, Rfl: 0     azelastine (ASTELIN) 0.1 % nasal spray, Administer 2 sprays into the nostril(s) as directed by provider 2 (Two) Times a Day. Use in each nostril as directed, Disp: 90 mL, Rfl: 0    betamethasone dipropionate 0.05 % lotion, Apply  topically to the appropriate area as directed 2 (Two) Times a Day. Apply small amount to scalp once daily., Disp: 60 mL, Rfl: 1    cetirizine (zyrTEC) 10 MG tablet, Take 1 tablet by mouth Daily., Disp: 30 tablet, Rfl: 1    ciclopirox (LOPROX) 1 % shampoo, USE AS A shampoo three times PER WEEK., Disp: , Rfl:     desonide (DESOWEN) 0.05 % ointment, Apply sparingly two times PER DAY TO THE affected AREAS ON THE FACE AND SCALP., Disp: , Rfl:     dorzolamide-timolol (COSOPT) 2-0.5 % ophthalmic solution, Apply 1 drop to eye(s) as directed by provider Every 12 (Twelve) Hours., Disp: , Rfl:     fluticasone (FLONASE) 50 MCG/ACT nasal spray, Administer 1 spray into the nostril(s) as directed by provider Daily., Disp: 48 g, Rfl: 2    metoprolol succinate XL (TOPROL-XL) 25 MG 24 hr tablet, TAKE 1 TABLET BY MOUTH TWICE DAILY, Disp: 180 tablet, Rfl: 0    rivaroxaban (Xarelto) 20 MG tablet, Take 1 tablet by mouth Daily With Dinner., Disp: 90 tablet, Rfl: 3    rosuvastatin (CRESTOR) 20 MG tablet, Take 1 tablet by mouth every night at bedtime., Disp: 90 tablet, Rfl: 0    sildenafil (REVATIO) 20 MG tablet, TAKE 1 TO 5 TABLETS BY MOUTH ONCE DAILY 30 TO 60 MINUTES BEFORE SEXUAL ACTIVITY. START WITH 1 TABLET AND INCREASE AS NEEDED., Disp: , Rfl:     timolol (TIMOPTIC) 0.5 % ophthalmic solution, Apply  to eye(s) as directed by provider Every 12 (Twelve) Hours., Disp: , Rfl:     Urea 20 Intensive Hydrating 20 % cream, , Disp: , Rfl:     valsartan (DIOVAN) 160 MG tablet, Take 1 tablet by mouth 2 (Two) Times a Day. *NEEDS LABS AND OV, Disp: 180 tablet, Rfl: 0    clotrimazole (LOTRIMIN) 1 % cream, Apply 1 Application topically to the appropriate area as directed 2 (Two) Times a Day., Disp: 113 g, Rfl: 1      Objective     Review of Systems   Constitutional: Negative.    HENT: Negative.     Respiratory: Negative.     Cardiovascular: Negative.    Musculoskeletal: Negative.    Psychiatric/Behavioral: Negative.         Physical Exam  Constitutional:       General: He is not in acute distress.     Appearance: Normal appearance. He is not ill-appearing, toxic-appearing or diaphoretic.   Cardiovascular:      Rate and Rhythm: Regular rhythm.      Heart sounds: Normal heart sounds. No murmur heard.     No gallop.   Pulmonary:      Effort: No respiratory distress.      Breath sounds: Normal breath sounds. No wheezing or rales.   Neurological:      Mental Status: He is alert and oriented to person, place, and time.   Psychiatric:         Mood and Affect: Mood normal.         Behavior: Behavior normal.         Thought Content: Thought content normal.       Physical Exam  Skin: Discoloration and thickening of the skin on the lower back, with some flaking observed.    Results  Labs   - Blood glucose: 119 mg/dL   - Hemoglobin A1c: 6%   - LDL cholesterol: 51 mg/dL   - Vitamin B12: 11/2020, 271 pg/mL   - Vitamin B12: 462 pg/mL   - Vitamin D: 07/2024, Low   - PSA: 0.7 ng/mL    Diagnostic Testing   - Colonoscopy: Three polyps removed   - Pathology: Tubular adenoma, negative for high grade dysplasia    Assessment & Plan    Problems Addressed this Visit          Cardiac and Vasculature    Hypertension       Endocrine and Metabolic    Vitamin D deficiency - Primary    Relevant Orders    Vitamin D 25 hydroxy (Completed)     Other Visit Diagnoses         Vitamin B12 deficiency        Relevant Orders    Vitamin B12      Encounter for screening for vascular disease              Diagnoses         Codes Comments      Vitamin D deficiency    -  Primary ICD-10-CM: E55.9  ICD-9-CM: 268.9       Vitamin B12 deficiency     ICD-10-CM: E53.8  ICD-9-CM: 266.2       Encounter for screening for vascular disease     ICD-10-CM: Z13.6  ICD-9-CM: V81.2        Primary hypertension     ICD-10-CM: I10  ICD-9-CM: 401.9           Assessment & Plan  1. Rash on the lower back.  - The rash appears to be a fungal infection, characterized by discoloration and skin thickening due to scratching. There is no evidence of a rash per se.  - Examination revealed skin thickening and flakiness, but no typical signs of a fungal infection.  - Discussed the possibility of fungal infection and the use of antifungal cream. If no improvement, referral to a dermatologist will be considered.  - Prescription for clotrimazole cream 113 g has been provided, to be applied twice daily for a duration of two weeks.    2. Knee pain.  - He is scheduled to receive gel injections in his knee in the first week of July or by 07/11/2025.  - Discussed the potential benefits and mixed outcomes of gel injections.  - Advised to proceed with the injections as they may provide significant relief and delay the need for a knee replacement.  - Encouraged to monitor pain levels and report any changes post-injection.    3. Vitamin D deficiency.  - His vitamin D levels were previously low in 07/2024.  - He has been taking vitamin D gummies daily.  - A lab test will be conducted today to assess his current vitamin D levels.  - If the levels are still low, adjustments to his supplementation will be made.    4. Health maintenance.  - Blood sugar level is 119, and hemoglobin A1c is 6%, indicating excellent diabetes control.  - LDL cholesterol is 51, which is within the ideal range of <55.  - Not anemic, and kidney function is normal with no proteinuria.  - PSA level was 0.7 seven months ago.  - Positive Cologuard test and underwent a colonoscopy in 07/2024, which revealed three benign polyps. A follow-up colonoscopy is recommended in five years.      No follow-ups on file.    Patient or patient representative verbalized consent for the use of Ambient Listening during the visit with  Vasyl Urena MD for chart documentation.  7/3/2025  12:29 EDT

## 2025-07-07 DIAGNOSIS — G47.419 NARCOLEPSY WITHOUT CATAPLEXY: ICD-10-CM

## 2025-07-07 RX ORDER — DEXTROAMPHETAMINE SACCHARATE, AMPHETAMINE ASPARTATE, DEXTROAMPHETAMINE SULFATE AND AMPHETAMINE SULFATE 5; 5; 5; 5 MG/1; MG/1; MG/1; MG/1
TABLET ORAL
Qty: 75 TABLET | Refills: 0 | Status: SHIPPED | OUTPATIENT
Start: 2025-07-07

## 2025-07-07 RX ORDER — ROSUVASTATIN CALCIUM 20 MG/1
20 TABLET, COATED ORAL
Qty: 90 TABLET | Refills: 1 | Status: SHIPPED | OUTPATIENT
Start: 2025-07-07

## 2025-07-11 ENCOUNTER — CLINICAL SUPPORT (OUTPATIENT)
Dept: ORTHOPEDIC SURGERY | Facility: CLINIC | Age: 70
End: 2025-07-11
Payer: MEDICARE

## 2025-07-11 VITALS — HEIGHT: 72 IN | BODY MASS INDEX: 36.34 KG/M2 | WEIGHT: 268.3 LBS | TEMPERATURE: 97.8 F

## 2025-07-11 DIAGNOSIS — M25.561 CHRONIC PAIN OF RIGHT KNEE: Primary | ICD-10-CM

## 2025-07-11 DIAGNOSIS — M17.11 ARTHRITIS OF RIGHT KNEE: ICD-10-CM

## 2025-07-11 DIAGNOSIS — G89.29 CHRONIC PAIN OF RIGHT KNEE: Primary | ICD-10-CM

## 2025-07-11 RX ORDER — METHYLPREDNISOLONE ACETATE 80 MG/ML
80 INJECTION, SUSPENSION INTRA-ARTICULAR; INTRALESIONAL; INTRAMUSCULAR; SOFT TISSUE
Status: COMPLETED | OUTPATIENT
Start: 2025-07-11 | End: 2025-07-11

## 2025-07-11 RX ORDER — LIDOCAINE HYDROCHLORIDE 10 MG/ML
2 INJECTION, SOLUTION EPIDURAL; INFILTRATION; INTRACAUDAL; PERINEURAL
Status: COMPLETED | OUTPATIENT
Start: 2025-07-11 | End: 2025-07-11

## 2025-07-11 RX ADMIN — LIDOCAINE HYDROCHLORIDE 2 ML: 10 INJECTION, SOLUTION EPIDURAL; INFILTRATION; INTRACAUDAL; PERINEURAL at 13:09

## 2025-07-11 RX ADMIN — METHYLPREDNISOLONE ACETATE 80 MG: 80 INJECTION, SUSPENSION INTRA-ARTICULAR; INTRALESIONAL; INTRAMUSCULAR; SOFT TISSUE at 13:09

## 2025-07-11 NOTE — PROGRESS NOTES
"Jose Ramon Reardon     : 1955     MRN: [id number]     DATE: 2025    CC:  Right knee osteoarthritis    SUBJECTIVE:  Patient returns today for follow up regarding right knee osteoarthritis.  Continues to complain of pain.  Localizes pain to the medial aspect.  He has difficulty walking long distances.  Denies any alleviating factors.    OBJECTIVE:    Temp 97.8 °F (36.6 °C)   Ht 182.9 cm (72\")   Wt 122 kg (268 lb 4.8 oz)   BMI 36.39 kg/m²     Exam:.     General:  Patient is awake and alert.  No acute distress.  Affect and demeanor appropriate.    Extremities:  Skin about knees is benign.  Motion is limited and uncomfortable.    DIAGNOSTIC STUDIES    Xrays:  None taken today.      ASSESSMENT:   Right knee osteoarthritis    PLAN:  We discussed both conservative and surgical treatment options in detail.  He verbalized understanding.  He is not ready to proceed with surgery.  He has opted to try a cortisone injection today. The risks, benefits, and alternatives were discussed and the patient consented to proceed.  The injection was performed as noted below.  Going forward, he may follow-up with me as needed.      Large Joint Arthrocentesis: R knee  Date/Time: 2025 1:09 PM  Consent given by: patient  Site marked: site marked  Timeout: Immediately prior to procedure a time out was called to verify the correct patient, procedure, equipment, support staff and site/side marked as required   Supporting Documentation  Indications: pain   Procedure Details  Location: knee - R knee  Preparation: Patient was prepped and draped in the usual sterile fashion  Needle gauge: 21 G.  Approach: anterolateral  Medications administered: 2 mL lidocaine PF 1% 1 %; 80 mg methylPREDNISolone acetate 80 MG/ML  Patient tolerance: patient tolerated the procedure well with no immediate complications       "

## 2025-07-16 RX ORDER — METOPROLOL SUCCINATE 25 MG/1
25 TABLET, EXTENDED RELEASE ORAL 2 TIMES DAILY
Qty: 180 TABLET | Refills: 0 | Status: SHIPPED | OUTPATIENT
Start: 2025-07-16

## 2025-07-16 NOTE — TELEPHONE ENCOUNTER
"Caller: Jose Ramon Reardon POOJA \"Adalberto\"    Relationship: Self    Best call back number:     [phone]        Requested Prescriptions:   Requested Prescriptions     Pending Prescriptions Disp Refills    metoprolol succinate XL (TOPROL-XL) 25 MG 24 hr tablet 180 tablet 0     Sig: Take 1 tablet by mouth 2 (Two) Times a Day.        Pharmacy where request should be sent: [id number] - [place] [id number] - [phone] [facility] [phone] FX     Last office visit with prescribing clinician: 6/26/2025   Last telemedicine visit with prescribing clinician: Visit date not found   Next office visit with prescribing clinician: Visit date not found     Additional details provided by patient:     Does the patient have less than a 3 day supply:  [x] Yes  [] No    Would you like a call back once the refill request has been completed: [] Yes [] No    If the office needs to give you a call back, can they leave a voicemail: [] Yes [] No    Rico Dean   07/16/25 13:06 EDT         "

## 2025-08-06 DIAGNOSIS — G47.419 NARCOLEPSY WITHOUT CATAPLEXY: ICD-10-CM

## 2025-08-06 RX ORDER — DEXTROAMPHETAMINE SACCHARATE, AMPHETAMINE ASPARTATE, DEXTROAMPHETAMINE SULFATE AND AMPHETAMINE SULFATE 5; 5; 5; 5 MG/1; MG/1; MG/1; MG/1
TABLET ORAL
Qty: 75 TABLET | Refills: 0 | Status: SHIPPED | OUTPATIENT
Start: 2025-08-06

## (undated) DEVICE — PATIENT RETURN ELECTRODE, SINGLE-USE, CONTACT QUALITY MONITORING, ADULT, WITH 9FT CORD, FOR PATIENTS WEIGING OVER 33LBS. (15KG): Brand: MEGADYNE

## (undated) DEVICE — SPNG GZ STRL 2S 4X4 12PLY

## (undated) DEVICE — BLD SHVE RESEC COOLCT SABRE CRV 4MM 13CM

## (undated) DEVICE — GLV SURG BIOGEL LTX PF 8 1/2

## (undated) DEVICE — SKIN PREP TRAY W/CHG: Brand: MEDLINE INDUSTRIES, INC.

## (undated) DEVICE — MSK PROC CURAPLEX O2 2/ADAPT 7FT

## (undated) DEVICE — APPL CHLORAPREP W/TINT 26ML ORNG

## (undated) DEVICE — ADAPT CLN BIOGUARD AIR/H2O DISP

## (undated) DEVICE — SENSR O2 OXIMAX FNGR A/ 18IN NONSTR

## (undated) DEVICE — PK ARTHSCP 40

## (undated) DEVICE — SUT ETHLN 4/0 PS2 PLSTC 1667G

## (undated) DEVICE — PREP SOL POVIDONE/IODINE BT 4OZ

## (undated) DEVICE — LN SMPL CO2 SHTRM SD STREAM W/M LUER

## (undated) DEVICE — UNDERCAST PADDING: Brand: DEROYAL

## (undated) DEVICE — DISPOSABLE TOURNIQUET CUFF SINGLE BLADDER, SINGLE PORT AND QUICK CONNECT CONNECTOR: Brand: COLOR CUFF

## (undated) DEVICE — DRSNG ADAPTIC 3X8

## (undated) DEVICE — BNDG ESMARK STRL 6INX12FT LF

## (undated) DEVICE — BNDG ELAS ELITE V/CLOSE 6IN 5YD LF STRL

## (undated) DEVICE — SNAR POLYP SENSATION STDOVL 27 240 BX40

## (undated) DEVICE — TUBING, SUCTION, 1/4" X 10', STRAIGHT: Brand: MEDLINE

## (undated) DEVICE — KT ORCA ORCAPOD DISP STRL

## (undated) DEVICE — TUBING SET, GRAVITY, 4-SPIKE

## (undated) DEVICE — MEDI-VAC YANK SUCT HNDL W/TPRD BULBOUS TIP: Brand: CARDINAL HEALTH

## (undated) DEVICE — SOL ISO/ALC RUB 70PCT 4OZ

## (undated) DEVICE — THE SINGLE USE ETRAP – POLYP TRAP IS USED FOR SUCTION RETRIEVAL OF ENDOSCOPICALLY REMOVED POLYPS.: Brand: ETRAP

## (undated) DEVICE — DRSNG WND GZ CURAD OIL EMULSION 3X3IN STRL

## (undated) DEVICE — GLV SURG TRIUMPH CLASSIC PF LTX 8.5 STRL